# Patient Record
Sex: MALE | Race: WHITE | Employment: OTHER | URBAN - METROPOLITAN AREA
[De-identification: names, ages, dates, MRNs, and addresses within clinical notes are randomized per-mention and may not be internally consistent; named-entity substitution may affect disease eponyms.]

---

## 2021-08-24 ENCOUNTER — OFFICE VISIT (OUTPATIENT)
Dept: FAMILY MEDICINE CLINIC | Facility: CLINIC | Age: 61
End: 2021-08-24
Payer: COMMERCIAL

## 2021-08-24 VITALS
HEIGHT: 66.34 IN | WEIGHT: 218 LBS | BODY MASS INDEX: 34.62 KG/M2 | SYSTOLIC BLOOD PRESSURE: 136 MMHG | RESPIRATION RATE: 16 BRPM | HEART RATE: 93 BPM | TEMPERATURE: 98 F | DIASTOLIC BLOOD PRESSURE: 80 MMHG | OXYGEN SATURATION: 96 %

## 2021-08-24 DIAGNOSIS — Z12.11 SCREENING FOR COLON CANCER: ICD-10-CM

## 2021-08-24 DIAGNOSIS — Z00.00 ANNUAL PHYSICAL EXAM: Primary | ICD-10-CM

## 2021-08-24 DIAGNOSIS — Z13.228 SCREENING FOR ENDOCRINE, NUTRITIONAL, METABOLIC AND IMMUNITY DISORDER: ICD-10-CM

## 2021-08-24 DIAGNOSIS — Z13.21 SCREENING FOR ENDOCRINE, NUTRITIONAL, METABOLIC AND IMMUNITY DISORDER: ICD-10-CM

## 2021-08-24 DIAGNOSIS — Z13.29 SCREENING FOR ENDOCRINE, NUTRITIONAL, METABOLIC AND IMMUNITY DISORDER: ICD-10-CM

## 2021-08-24 DIAGNOSIS — Z13.0 SCREENING FOR ENDOCRINE, NUTRITIONAL, METABOLIC AND IMMUNITY DISORDER: ICD-10-CM

## 2021-08-24 DIAGNOSIS — Z13.6 SCREENING FOR ISCHEMIC HEART DISEASE: ICD-10-CM

## 2021-08-24 DIAGNOSIS — R20.0 NUMBNESS AND TINGLING OF BOTH LEGS BELOW KNEES: ICD-10-CM

## 2021-08-24 DIAGNOSIS — M79.2 NEURALGIA OF LOWER EXTREMITY, UNSPECIFIED LATERALITY: ICD-10-CM

## 2021-08-24 DIAGNOSIS — R20.2 NUMBNESS AND TINGLING OF BOTH LEGS BELOW KNEES: ICD-10-CM

## 2021-08-24 PROCEDURE — 3079F DIAST BP 80-89 MM HG: CPT | Performed by: FAMILY MEDICINE

## 2021-08-24 PROCEDURE — 3008F BODY MASS INDEX DOCD: CPT | Performed by: FAMILY MEDICINE

## 2021-08-24 PROCEDURE — 3075F SYST BP GE 130 - 139MM HG: CPT | Performed by: FAMILY MEDICINE

## 2021-08-24 PROCEDURE — 99386 PREV VISIT NEW AGE 40-64: CPT | Performed by: FAMILY MEDICINE

## 2021-08-24 NOTE — PATIENT INSTRUCTIONS
Perform labs fasting 8 hours with water or black coffee or or black tea diet  soda only prior to exam.    -Encourage healthy diet of whole food and avoid processed food and sugary drinks and sodas.   Diet should include lean meats and vegetables including 5 your blood pressure to help prevent a stroke or heart attack  · Control diabetes, or reduce your risk of getting this disease  · Improve your heart and lung function  · Reach and maintain a healthy weight  · Make your muscles stronger and more limber so yo healthier can improve several of your heart risks at once. For instance, it helps you manage weight, cholesterol, and blood pressure. Here are ideas to help you make heart-healthy changes without giving up all the foods and flavors you love.   Getting start than what is listed here:  · Fruits and vegetables provide plenty of nutrients without a lot of calories. At meals, fill half your plate with these foods. Split the other half of your plate between whole grains and lean protein.   · Whole grains are high in cilantro, cinnamon, pepper, and rosemary. Date Last Reviewed: 10/1/2017  © 5337-8529 [employer]. [facility]7 [facility] [place] [facility], [address] [place] [address]. All rights reserved. This information is not intended as a substitute for professional medical care. treatments do have risks. So talk with your healthcare provider if you have concerns. If you have osteoporosis, you can also learn ways to increase everyday safety. Date Last Reviewed: 5/1/2018  © 9201-5053 [employer].  Jonna Dickens. old, men: 1,000 mg  Pregnant or nursing: 15to 25years old: 1,300 mg, 23to 48years old: 1,000 mg  Older than 79 (women and men): 1,200 mg   Date Last Reviewed: 5/1/2018  © 2330-7264 [employer]. 1407 [facility], [facility].  A parathyroid gland  · Cancer  · Autoimmune diseases, such as multiple sclerosis and Crohn's disease  · Psoriasis  · Asthma  · Weakness or falls    What other tests might I have along with this test?  A healthcare provider may also want to check your parathy test?  Tell your healthcare provider if you take vitamin D supplements. Be sure your healthcare provider knows about all medicines, herbs, vitamins, and supplements you are taking.  This includes medicines that don't need a prescription and any illicit drug professional's instructions. Living with Osteoporosis: Regular Exercise  If you have osteoporosis, exercise is vital for your health. It can prevent bone fractures and spine changes. It will slow bone loss. Exercise will strengthen your body.  It c

## 2021-08-27 PROBLEM — E53.8 B12 DEFICIENCY: Status: ACTIVE | Noted: 2021-08-27

## 2021-08-27 LAB
ABSOLUTE BASOPHILS: 88 CELLS/UL (ref 0–200)
ABSOLUTE EOSINOPHILS: 440 CELLS/UL (ref 15–500)
ABSOLUTE LYMPHOCYTES: 3144 CELLS/UL (ref 850–3900)
ABSOLUTE MONOCYTES: 776 CELLS/UL (ref 200–950)
ABSOLUTE NEUTROPHILS: 3552 CELLS/UL (ref 1500–7800)
ALBUMIN/GLOBULIN RATIO: 1.8 (CALC) (ref 1–2.5)
ALBUMIN: 3.5 G/DL (ref 3.6–5.1)
ALKALINE PHOSPHATASE: 54 U/L (ref 35–144)
ALT: 27 U/L (ref 9–46)
AST: 24 U/L (ref 10–35)
BASOPHILS: 1.1 %
BILIRUBIN, TOTAL: 0.3 MG/DL (ref 0.2–1.2)
BUN: 18 MG/DL (ref 7–25)
CALCIUM: 8.8 MG/DL (ref 8.6–10.3)
CARBON DIOXIDE: 30 MMOL/L (ref 20–32)
CHLORIDE: 110 MMOL/L (ref 98–110)
CHOL/HDLC RATIO: 3.2 (CALC)
CHOLESTEROL, TOTAL: 167 MG/DL
CREATININE: 0.92 MG/DL (ref 0.7–1.25)
EGFR IF AFRICN AM: 103 ML/MIN/1.73M2
EGFR IF NONAFRICN AM: 89 ML/MIN/1.73M2
EOSINOPHILS: 5.5 %
GLOBULIN: 2 G/DL (CALC) (ref 1.9–3.7)
GLUCOSE: 93 MG/DL (ref 65–99)
HDL CHOLESTEROL: 53 MG/DL
HEMATOCRIT: 46.2 % (ref 38.5–50)
HEMOGLOBIN: 15.1 G/DL (ref 13.2–17.1)
LDL-CHOLESTEROL: 98 MG/DL (CALC)
LYMPHOCYTES: 39.3 %
MCH: 29.5 PG (ref 27–33)
MCHC: 32.7 G/DL (ref 32–36)
MCV: 90.2 FL (ref 80–100)
MONOCYTES: 9.7 %
MPV: 10.4 FL (ref 7.5–12.5)
NEUTROPHILS: 44.4 %
NON-HDL CHOLESTEROL: 114 MG/DL (CALC)
PLATELET COUNT: 354 THOUSAND/UL (ref 140–400)
POTASSIUM: 4.6 MMOL/L (ref 3.5–5.3)
PROTEIN, TOTAL: 5.5 G/DL (ref 6.1–8.1)
PSA, TOTAL: 2 NG/ML
RDW: 13 % (ref 11–15)
RED BLOOD CELL COUNT: 5.12 MILLION/UL (ref 4.2–5.8)
SODIUM: 144 MMOL/L (ref 135–146)
TRIGLYCERIDES: 75 MG/DL
TSH W/REFLEX TO FT4: 1.87 MIU/L (ref 0.4–4.5)
VITAMIN B12: 265 PG/ML (ref 200–1100)
WHITE BLOOD CELL COUNT: 8 THOUSAND/UL (ref 3.8–10.8)

## 2021-09-07 NOTE — PROGRESS NOTES
Fly Wong,    I have reviewed your test results. . Your stool screening test for colon cancer is negative. Recommend repeat in 1 year or undergo colonoscopy. Please call our office if you have any questions.     Sincerely,  Daxa Barbosa, DO

## 2021-09-29 ENCOUNTER — HOSPITAL ENCOUNTER (OUTPATIENT)
Dept: ULTRASOUND IMAGING | Age: 61
Discharge: HOME | End: 2021-09-29
Attending: FAMILY MEDICINE
Payer: COMMERCIAL

## 2021-09-29 DIAGNOSIS — R20.2 NUMBNESS AND TINGLING OF BOTH LEGS BELOW KNEES: ICD-10-CM

## 2021-09-29 DIAGNOSIS — M79.2 NEURALGIA OF LOWER EXTREMITY, UNSPECIFIED LATERALITY: ICD-10-CM

## 2021-09-29 DIAGNOSIS — R20.0 NUMBNESS AND TINGLING OF BOTH LEGS BELOW KNEES: ICD-10-CM

## 2021-09-29 PROCEDURE — 93922 UPR/L XTREMITY ART 2 LEVELS: CPT | Performed by: FAMILY MEDICINE

## 2021-09-30 ENCOUNTER — PATIENT MESSAGE (OUTPATIENT)
Dept: FAMILY MEDICINE CLINIC | Facility: CLINIC | Age: 61
End: 2021-09-30

## 2021-09-30 NOTE — TELEPHONE ENCOUNTER
From: Sergio Bundy  To: Elías Rachel DO  Sent: 9/30/2021 8:49 AM CDT  Subject: test results    Hi Dr. Naveen Marcos, I have done the ultrasound and it has come back negative.  Should I now schedule the Neuro visit with Dr. Miguelito Duarte as is listed on the

## 2021-10-23 PROBLEM — S16.1XXA CERVICAL STRAIN, ACUTE: Status: ACTIVE | Noted: 2018-07-10

## 2021-10-25 ENCOUNTER — OFFICE VISIT (OUTPATIENT)
Dept: FAMILY MEDICINE CLINIC | Facility: CLINIC | Age: 61
End: 2021-10-25
Payer: COMMERCIAL

## 2021-10-25 VITALS
RESPIRATION RATE: 18 BRPM | OXYGEN SATURATION: 96 % | SYSTOLIC BLOOD PRESSURE: 128 MMHG | TEMPERATURE: 98 F | DIASTOLIC BLOOD PRESSURE: 80 MMHG | WEIGHT: 226 LBS | HEART RATE: 112 BPM | BODY MASS INDEX: 35.89 KG/M2 | HEIGHT: 66.34 IN

## 2021-10-25 DIAGNOSIS — R06.00 DYSPNEA ON EXERTION: ICD-10-CM

## 2021-10-25 DIAGNOSIS — R94.31 ABNORMAL RESTING ECG FINDINGS: ICD-10-CM

## 2021-10-25 DIAGNOSIS — I25.2 OLD ANTEROSEPTAL MYOCARDIAL INFARCTION: ICD-10-CM

## 2021-10-25 DIAGNOSIS — R20.0 NUMBNESS AND TINGLING OF BOTH LEGS BELOW KNEES: Primary | ICD-10-CM

## 2021-10-25 DIAGNOSIS — R20.2 NUMBNESS AND TINGLING OF BOTH LEGS BELOW KNEES: Primary | ICD-10-CM

## 2021-10-25 DIAGNOSIS — E53.8 B12 DEFICIENCY: ICD-10-CM

## 2021-10-25 DIAGNOSIS — R00.0 SINUS TACHYCARDIA: ICD-10-CM

## 2021-10-25 PROBLEM — R06.09 DYSPNEA ON EXERTION: Status: ACTIVE | Noted: 2021-10-25

## 2021-10-25 PROCEDURE — 93000 ELECTROCARDIOGRAM COMPLETE: CPT | Performed by: FAMILY MEDICINE

## 2021-10-25 PROCEDURE — 3008F BODY MASS INDEX DOCD: CPT | Performed by: FAMILY MEDICINE

## 2021-10-25 PROCEDURE — 3074F SYST BP LT 130 MM HG: CPT | Performed by: FAMILY MEDICINE

## 2021-10-25 PROCEDURE — 99215 OFFICE O/P EST HI 40 MIN: CPT | Performed by: FAMILY MEDICINE

## 2021-10-25 PROCEDURE — 3079F DIAST BP 80-89 MM HG: CPT | Performed by: FAMILY MEDICINE

## 2021-10-25 NOTE — PROGRESS NOTES
Tamra COMPLAINT: Patient presents with:  Lab Results        HPI:     Price Taylor is a 57year old male presents for follow-up on neuropathy that occurs when standing or walking for 10 minutes. Balance will be thrown off.   Symptoms are relieved by (102.5 kg)  08/24/21 : 218 lb (98.9 kg)        HISTORY:  History reviewed. No pertinent past medical history. History reviewed. No pertinent surgical history.    Family History   Problem Relation Age of Onset   • Cancer Father 80        cancer blood   • C clubbing, or edema bilaterally. Skin: no acute rashes  Neuro: AOx3.   Normal gait    LABS     ECG: Sinus tachycardia with old anterior septal infarct  WU=747 bpm  FL interval,QT/Qtc,PRT Axes all reviewed and are normal  No Ectopy noted  No prior for ramy 54   AST      10 - 35 U/L 24   ALT (SGPT)      9 - 46 U/L 27   Cholesterol, Total      <200 mg/dL 167   HDL Cholesterol      > OR = 40 mg/dL 53   Triglycerides      <150 mg/dL 75   LDL Cholesterol Calc      mg/dL (calc) 98   CHOL/HDLC RATIO      <5.0 (calc dyspnea on exertion after 300 feet and feels exhausted  Feels heart rate is easily elevated-thyroid is normal.  Electrolytes are normal.  Start with nuclear stress test rule out CAD, may need cardiac echo to check ejection fracture and heart function-no or extremity     BMI 34.0-34.9,adult     B12 deficiency     Hemorrhage of rectum and anus     Cervical strain, acute      Imaging & Referrals:  None     10/25/2021  Kinjal Kennedy, DO      Patient understands plan and follow-up.   Return in about 1 month ([place]

## 2021-11-18 ENCOUNTER — PATIENT MESSAGE (OUTPATIENT)
Dept: FAMILY MEDICINE CLINIC | Facility: CLINIC | Age: 61
End: 2021-11-18

## 2021-11-19 NOTE — TELEPHONE ENCOUNTER
From: Margaret Armas  To: Dejuan Hong,   Sent: 11/18/2021 5:44 PM CST  Subject: B 12 results    Hi Dr. Alysha Andre,   We saw the results of [place]'s B12 test. I want to tell you why it might be so high.  Since you saw him last he has continued taking the roberth

## 2021-11-22 ENCOUNTER — TELEPHONE (OUTPATIENT)
Dept: FAMILY MEDICINE CLINIC | Facility: CLINIC | Age: 61
End: 2021-11-22

## 2021-11-22 NOTE — TELEPHONE ENCOUNTER
Rigo mcgowan     ARIELLE Clinical Guideline 024 for Myocardial Perfusion Imaging was used to make this decision. · This decision was based on the notes that were sent: shortness of breath with exertion, a heart tracing   (ECG (Electrocardiogram)).    · Bef

## 2021-11-24 ENCOUNTER — OFFICE VISIT (OUTPATIENT)
Dept: NEUROLOGY | Facility: CLINIC | Age: 61
End: 2021-11-24
Payer: COMMERCIAL

## 2021-11-24 VITALS
DIASTOLIC BLOOD PRESSURE: 95 MMHG | SYSTOLIC BLOOD PRESSURE: 145 MMHG | RESPIRATION RATE: 16 BRPM | HEART RATE: 79 BPM | HEIGHT: 66.34 IN | BODY MASS INDEX: 35.89 KG/M2 | WEIGHT: 226 LBS

## 2021-11-24 DIAGNOSIS — R20.0 NUMBNESS IN BOTH LEGS: ICD-10-CM

## 2021-11-24 DIAGNOSIS — G95.19 NEUROGENIC CLAUDICATION (HCC): Primary | ICD-10-CM

## 2021-11-24 PROCEDURE — 3080F DIAST BP >= 90 MM HG: CPT | Performed by: OTHER

## 2021-11-24 PROCEDURE — 3008F BODY MASS INDEX DOCD: CPT | Performed by: OTHER

## 2021-11-24 PROCEDURE — 99244 OFF/OP CNSLTJ NEW/EST MOD 40: CPT | Performed by: OTHER

## 2021-11-24 PROCEDURE — 3077F SYST BP >= 140 MM HG: CPT | Performed by: OTHER

## 2021-11-24 NOTE — TELEPHONE ENCOUNTER
Spoke to pt informed of authorization provided authorization number.  Provided central scheduling number [phone] to schedule test.

## 2021-11-24 NOTE — TELEPHONE ENCOUNTER
Peer to peer completed  Authorization #[id number] valid until 2/3/2022-middle digit-is I as in [place] and not a 1. Valid until 2/3/2022    Please tell patient to schedule his myocardial perfusion imaging. Spoke with insurance and received authorization.

## 2021-11-24 NOTE — PATIENT INSTRUCTIONS
Please have labs done and imaging done and schedule EMG next available time  Follow up ~ 2 months     Refill policies:    • Allow 2-3 business days for refills; controlled substances may take longer.   • Contact your pharmacy at least 5 days prior to zaida schedule the test until this office has notified you that the test has been approved by your insurer. Depending on your insurance carrier, approval may take 3-10 days.  It is highly recommended patients contact their insurance carrier directly to determine

## 2021-11-24 NOTE — H&P
[facility] New Patient / Consult Visit    Broderick Viveros is a 57year old male.                          Referring MD: Christina Cheek    Patient presents with:  Numbness: Referred by PCP, Numbness/tingling bilat LE radiating up to buttoc mentions a long time ago, he started to have \"stiff neck\" and was told he had \"slipped disc\" and told he could pursue surgery or therapy and never had surgery.             Otherwise, patient denies any recent weight change, fevers, chills, nausea, doubl motion; no carotid bruits    Mental status:  Alert and oriented to time, place, person, and situation  Speech: fluent  Language: normal naming, repetition, and comprehension  Memory: normal  Attention/concentration: normal    Fundoscopic Exam: optic discs RDW      11.0 - 15.0 %  13.0   Platelet Count      479 - 400 Thousand/uL  354   MPV      7.5 - 12.5 fL  10.4   Neutrophils Absolute      1,500 - 7,800 cells/uL  3,552   Lymphocytes Absolute      850 - 3,900 cells/uL  3,144   Monocytes Absolute      200 - LE in ankle jerks. In addition, straight leg raise is negative and gait is normal and no weakness is noted.       Patient was previously noted to have low B12 but symptoms with numbness in leg up to buttocks worse with exertion are suggestive of neurogenic

## 2021-11-29 ENCOUNTER — TELEPHONE (OUTPATIENT)
Dept: NEUROLOGY | Facility: CLINIC | Age: 61
End: 2021-11-29

## 2021-11-29 NOTE — TELEPHONE ENCOUNTER
Vitaly Larkin from [facility] states monoclonal protein study did not have test code attached to order. Nothing translated per her research. Provided Vitaly Larkin with associated [facility] code # 74843. KEVIN and Vitaly Larkin will now process sample.

## 2021-12-02 ENCOUNTER — PROCEDURE VISIT (OUTPATIENT)
Dept: NEUROLOGY | Facility: CLINIC | Age: 61
End: 2021-12-02
Payer: COMMERCIAL

## 2021-12-02 DIAGNOSIS — R20.0 NUMBNESS IN BOTH LEGS: Primary | ICD-10-CM

## 2021-12-02 PROCEDURE — 95909 NRV CNDJ TST 5-6 STUDIES: CPT | Performed by: OTHER

## 2021-12-02 PROCEDURE — 95886 MUSC TEST DONE W/N TEST COMP: CPT | Performed by: OTHER

## 2021-12-02 NOTE — PROCEDURES
[facility]  [address] [facility] [place], [address] [facility]  Ph: [phone]  FAX: [phone]        Full Name: Jesús Shi Gender: Male  Patient ID: [id number] YOB: 1960      Visit Date: 12/2/2021 11:12  Ag - Ankle 37 9.48 39       F  Wave      Nerve F Lat M Lat F-M Lat    ms ms ms   R Peroneal - EDB NR NR NR   R Tibial - AH 41.8 5.8 35.9   L Tibial - AH 47.4 5.5 41.9   L Peroneal - EDB 63.3 6.2 57.1       EMG         EMG Summary Table     Spontaneous MUAP Re fasciculations was noted in the bilateral medial gastrocnemius muscle. Motor unit potentials demonstrated increased amplitude and duration and mild polyphasia with reduced recruitment in the bilateral medial gastrocnemius muscle      Impression:    This is

## 2021-12-23 ENCOUNTER — HOSPITAL ENCOUNTER (OUTPATIENT)
Dept: MRI IMAGING | Age: 61
Discharge: HOME | End: 2021-12-23
Attending: Other
Payer: COMMERCIAL

## 2021-12-23 DIAGNOSIS — G95.19 NEUROGENIC CLAUDICATION (HCC): ICD-10-CM

## 2021-12-23 PROCEDURE — 72148 MRI LUMBAR SPINE W/O DYE: CPT | Performed by: OTHER

## 2021-12-27 ENCOUNTER — HOSPITAL ENCOUNTER (OUTPATIENT)
Dept: CV DIAGNOSTICS | Facility: HOSPITAL | Age: 61
Discharge: HOME | End: 2021-12-27
Attending: FAMILY MEDICINE
Payer: COMMERCIAL

## 2021-12-27 DIAGNOSIS — R94.31 ABNORMAL RESTING ECG FINDINGS: ICD-10-CM

## 2021-12-27 DIAGNOSIS — I25.2 OLD ANTEROSEPTAL MYOCARDIAL INFARCTION: ICD-10-CM

## 2021-12-27 DIAGNOSIS — R06.00 DYSPNEA ON EXERTION: ICD-10-CM

## 2021-12-27 PROCEDURE — 78452 HT MUSCLE IMAGE SPECT MULT: CPT | Performed by: FAMILY MEDICINE

## 2021-12-27 PROCEDURE — 93017 CV STRESS TEST TRACING ONLY: CPT | Performed by: FAMILY MEDICINE

## 2021-12-27 PROCEDURE — 93018 CV STRESS TEST I&R ONLY: CPT | Performed by: FAMILY MEDICINE

## 2021-12-29 ENCOUNTER — TELEPHONE (OUTPATIENT)
Dept: FAMILY MEDICINE CLINIC | Facility: CLINIC | Age: 61
End: 2021-12-29

## 2021-12-29 NOTE — TELEPHONE ENCOUNTER
Please see prior message-at 1150 response was given. Signed              Patient should schedule an office visit to recheck and discuss blood pressure medications and best treatment options.   We typically do not do this over phone encounter--start new

## 2021-12-29 NOTE — TELEPHONE ENCOUNTER
This was a My chart message received from patient. Taylor Stone To   Holgeraby Siegel, DO Sent   12/29/2021  9:01 AM   Hello,  Have you received any results of my MRI 12/23/21  or my stress test 12/27/21  ? I am wondering what to do from here.   My b

## 2021-12-29 NOTE — TELEPHONE ENCOUNTER
Patient should schedule an office visit to recheck and discuss blood pressure medications and best treatment options.   We typically do not do this over phone encounter--start new medication etc.  Need current vitals in office and physical exam blood pressu

## 2022-01-11 ENCOUNTER — OFFICE VISIT (OUTPATIENT)
Dept: FAMILY MEDICINE CLINIC | Facility: CLINIC | Age: 62
End: 2022-01-11
Payer: COMMERCIAL

## 2022-01-11 VITALS
SYSTOLIC BLOOD PRESSURE: 156 MMHG | OXYGEN SATURATION: 98 % | BODY MASS INDEX: 36.29 KG/M2 | DIASTOLIC BLOOD PRESSURE: 89 MMHG | WEIGHT: 225.81 LBS | RESPIRATION RATE: 16 BRPM | TEMPERATURE: 98 F | HEART RATE: 97 BPM | HEIGHT: 66 IN

## 2022-01-11 DIAGNOSIS — I10 ESSENTIAL HYPERTENSION: Primary | ICD-10-CM

## 2022-01-11 PROCEDURE — 3077F SYST BP >= 140 MM HG: CPT | Performed by: FAMILY MEDICINE

## 2022-01-11 PROCEDURE — 3079F DIAST BP 80-89 MM HG: CPT | Performed by: FAMILY MEDICINE

## 2022-01-11 PROCEDURE — 3008F BODY MASS INDEX DOCD: CPT | Performed by: FAMILY MEDICINE

## 2022-01-11 PROCEDURE — 99214 OFFICE O/P EST MOD 30 MIN: CPT | Performed by: FAMILY MEDICINE

## 2022-01-11 RX ORDER — ATENOLOL 25 MG/1
25 TABLET ORAL DAILY
Qty: 90 TABLET | Refills: 0 | Status: SHIPPED | OUTPATIENT
Start: 2022-01-11

## 2022-01-11 NOTE — PROGRESS NOTES
CHIEF COMPLAINT: Patient presents with:  Hypertension: high blood pressure reading       HPI:     Ignacio Curtis is a 57year old male presents for discuss hypertension.   Monitoring Home blood pressures 150-160/  States nevertheless than 150 systoli Vaping Use: Never used    Alcohol use:  Yes      Alcohol/week: 4.0 - 6.0 standard drinks      Types: 4 - 6 Cans of beer per week      Comment: 2 drinks daily    Drug use: Never       Medications (Active prior to today's visit):  No current outpatient medi pressures less than 140/90-optimal is 110-120/70-80.   Reviewed symptoms of hypotension i.e. fatigue, lightheadedness, dizziness, weakness etc.-if occurs check blood pressure and call office  Encouraged low-sodium diet less than 2 g daily  Recheck blood pre pressure.

## 2022-01-24 ENCOUNTER — OFFICE VISIT (OUTPATIENT)
Dept: NEUROLOGY | Facility: CLINIC | Age: 62
End: 2022-01-24
Payer: COMMERCIAL

## 2022-01-24 VITALS
RESPIRATION RATE: 16 BRPM | HEIGHT: 66 IN | HEART RATE: 70 BPM | BODY MASS INDEX: 36.16 KG/M2 | DIASTOLIC BLOOD PRESSURE: 76 MMHG | SYSTOLIC BLOOD PRESSURE: 110 MMHG | WEIGHT: 225 LBS

## 2022-01-24 DIAGNOSIS — M48.062 SPINAL STENOSIS OF LUMBAR REGION WITH NEUROGENIC CLAUDICATION: Primary | ICD-10-CM

## 2022-01-24 PROCEDURE — 3008F BODY MASS INDEX DOCD: CPT | Performed by: OTHER

## 2022-01-24 PROCEDURE — 3074F SYST BP LT 130 MM HG: CPT | Performed by: OTHER

## 2022-01-24 PROCEDURE — 3078F DIAST BP <80 MM HG: CPT | Performed by: OTHER

## 2022-01-24 PROCEDURE — 99214 OFFICE O/P EST MOD 30 MIN: CPT | Performed by: OTHER

## 2022-01-24 NOTE — PROGRESS NOTES
[facility] Progress Note    HPI  Patient presents with:  Numbness: F/U BLE, notes no improvement       As per my initial H&P from 11/24/2021,   \" Lynne Rene is a 57year old, who presents for evaluation of numbness/tingling in bot never had surgery.             Otherwise, patient denies any recent weight change, fevers, chills, nausea, double vision/ blurry vision / loss of vision, chest pain, palpitations, shortness of breath, rashes, joint pains, bowel / bladder incontinence or moo Concerns:        Caffeine Concern: Yes          1 cup coffee daily        Exercise: No        Seat Belt: No        Special Diet: No        Stress Concern: No        Weight Concern: No      No Known Allergies      Current Outpatient Medications:   •  atenol ALPHA-2-GLOBULINS      0.5 - 0.9 g/dL  0.7    BETA 1 GLOBULINL ELPH-MCNC      0.4 - 0.6 g/dL  0.4    BETA 2 GLOBULIN      0.2 - 0.5 g/dL  0.4    GAMMA GLOBULINS      0.8 - 1.7 g/dL  0.8    HUE SCREEN, IFA      NEGATIVE   NEGATIVE   RHEUMATOID FACTOR 144 U/L  54   AST (SGOT)      10 - 35 U/L  24   ALT (SGPT)      9 - 46 U/L  27   TSH      0.40 - 4.50 mIU/L  1.87   Vitamin B12      200 - 1,100 pg/mL 1,589 (H)        Imaging:  New since last visit    MRI lumbar spine (12/23/2021):      FINDINGS:     LUMBA congenitally slender canal.        2. L3-4 moderate disc bulge with severe central canal stenosis and mild bilateral neural foraminal narrowing.       3.  L4-5 with moderate broad-based disc bulge, ligamentum flavum hypertrophy, facet joint degenerative corrie Vastus medialis Femoral L2-L4 N None None None None N N N N   R. Tibialis anterior Deep peroneal (Fibular) L4-L5 N None None None None N N N N   L. Tibialis anterior Deep peroneal (Fibular) L4-L5 N None None None None N N N N   R.  Peroneus longus Perineal gastrocnemius muscles. This may suggest a chronic lumbosacral radiculopathy, although no denervation changes were appreciated in the lumbar paraspinal muscles.   However, there was no evidence for an underlying large fiber polyneuropathy, primary demyelina questions and all questions answered to the best of my ability       No follow-ups on file.     Sushma Shelley MD, Neurology  [facility]  Pager [phone]  1/24/2022

## 2022-02-01 ENCOUNTER — OFFICE VISIT (OUTPATIENT)
Dept: SURGERY | Facility: CLINIC | Age: 62
End: 2022-02-01
Payer: COMMERCIAL

## 2022-02-01 VITALS
BODY MASS INDEX: 36.16 KG/M2 | SYSTOLIC BLOOD PRESSURE: 118 MMHG | WEIGHT: 225 LBS | DIASTOLIC BLOOD PRESSURE: 74 MMHG | HEIGHT: 66 IN

## 2022-02-01 DIAGNOSIS — M48.062 LUMBAR STENOSIS WITH NEUROGENIC CLAUDICATION: Primary | ICD-10-CM

## 2022-02-01 PROCEDURE — 99203 OFFICE O/P NEW LOW 30 MIN: CPT | Performed by: NEUROLOGICAL SURGERY

## 2022-02-01 PROCEDURE — 3078F DIAST BP <80 MM HG: CPT | Performed by: NEUROLOGICAL SURGERY

## 2022-02-01 PROCEDURE — 3074F SYST BP LT 130 MM HG: CPT | Performed by: NEUROLOGICAL SURGERY

## 2022-02-01 PROCEDURE — 3008F BODY MASS INDEX DOCD: CPT | Performed by: NEUROLOGICAL SURGERY

## 2022-02-01 RX ORDER — GABAPENTIN 100 MG/1
100 CAPSULE ORAL 3 TIMES DAILY
Qty: 180 CAPSULE | Refills: 2 | Status: SHIPPED | OUTPATIENT
Start: 2022-02-01 | End: 2022-03-17

## 2022-02-01 NOTE — PROGRESS NOTES
MRI done in December  When walking and after walking long distances will have pain in both legs, pretty equal    No falls    No back sx  No injections  No PT

## 2022-02-11 ENCOUNTER — OFFICE VISIT (OUTPATIENT)
Dept: FAMILY MEDICINE CLINIC | Facility: CLINIC | Age: 62
End: 2022-02-11
Payer: COMMERCIAL

## 2022-02-11 VITALS
TEMPERATURE: 98 F | DIASTOLIC BLOOD PRESSURE: 70 MMHG | WEIGHT: 226.19 LBS | HEIGHT: 66 IN | OXYGEN SATURATION: 97 % | BODY MASS INDEX: 36.35 KG/M2 | SYSTOLIC BLOOD PRESSURE: 122 MMHG | HEART RATE: 67 BPM | RESPIRATION RATE: 18 BRPM

## 2022-02-11 DIAGNOSIS — I10 ESSENTIAL HYPERTENSION: Primary | ICD-10-CM

## 2022-02-11 PROCEDURE — 99213 OFFICE O/P EST LOW 20 MIN: CPT | Performed by: FAMILY MEDICINE

## 2022-02-11 PROCEDURE — 3008F BODY MASS INDEX DOCD: CPT | Performed by: FAMILY MEDICINE

## 2022-02-11 PROCEDURE — 3078F DIAST BP <80 MM HG: CPT | Performed by: FAMILY MEDICINE

## 2022-02-11 PROCEDURE — 3074F SYST BP LT 130 MM HG: CPT | Performed by: FAMILY MEDICINE

## 2022-02-11 RX ORDER — ATENOLOL 25 MG/1
25 TABLET ORAL DAILY
Qty: 90 TABLET | Refills: 1 | Status: SHIPPED | OUTPATIENT
Start: 2022-02-11

## 2022-02-21 ENCOUNTER — HOSPITAL ENCOUNTER (OUTPATIENT)
Dept: GENERAL RADIOLOGY | Age: 62
Discharge: HOME | End: 2022-02-21
Attending: NEUROLOGICAL SURGERY
Payer: COMMERCIAL

## 2022-02-21 DIAGNOSIS — M48.062 LUMBAR STENOSIS WITH NEUROGENIC CLAUDICATION: ICD-10-CM

## 2022-02-21 PROCEDURE — 72114 X-RAY EXAM L-S SPINE BENDING: CPT | Performed by: NEUROLOGICAL SURGERY

## 2022-02-22 LAB
BUN: 20 MG/DL (ref 7–25)
CALCIUM: 9.1 MG/DL (ref 8.6–10.3)
CARBON DIOXIDE: 30 MMOL/L (ref 20–32)
CHLORIDE: 105 MMOL/L (ref 98–110)
CREATININE: 0.94 MG/DL (ref 0.7–1.25)
EGFR IF AFRICN AM: 100 ML/MIN/1.73M2
EGFR IF NONAFRICN AM: 87 ML/MIN/1.73M2
GLUCOSE: 111 MG/DL (ref 65–139)
POTASSIUM: 4.8 MMOL/L (ref 3.5–5.3)
SODIUM: 141 MMOL/L (ref 135–146)

## 2022-03-17 ENCOUNTER — OFFICE VISIT (OUTPATIENT)
Dept: SURGERY | Facility: CLINIC | Age: 62
End: 2022-03-17
Payer: COMMERCIAL

## 2022-03-17 VITALS
BODY MASS INDEX: 36 KG/M2 | SYSTOLIC BLOOD PRESSURE: 100 MMHG | HEART RATE: 69 BPM | DIASTOLIC BLOOD PRESSURE: 80 MMHG | OXYGEN SATURATION: 96 % | WEIGHT: 226 LBS

## 2022-03-17 DIAGNOSIS — M48.062 LUMBAR STENOSIS WITH NEUROGENIC CLAUDICATION: Primary | ICD-10-CM

## 2022-03-17 PROCEDURE — 3074F SYST BP LT 130 MM HG: CPT | Performed by: NEUROLOGICAL SURGERY

## 2022-03-17 PROCEDURE — 99213 OFFICE O/P EST LOW 20 MIN: CPT | Performed by: NEUROLOGICAL SURGERY

## 2022-03-17 PROCEDURE — 3079F DIAST BP 80-89 MM HG: CPT | Performed by: NEUROLOGICAL SURGERY

## 2022-03-17 RX ORDER — PREGABALIN 75 MG/1
75 CAPSULE ORAL 2 TIMES DAILY
Qty: 120 CAPSULE | Refills: 1 | Status: SHIPPED | OUTPATIENT
Start: 2022-03-17

## 2022-03-17 NOTE — PROGRESS NOTES
Patient presents in office today with reported pain in low back radiating into bilateral legs. He is having bilateral numbness and tingling down bilateral legs. He has no pain but a lot of discomfort.

## 2022-03-18 ENCOUNTER — TELEPHONE (OUTPATIENT)
Dept: SURGERY | Facility: CLINIC | Age: 62
End: 2022-03-18

## 2022-03-18 NOTE — TELEPHONE ENCOUNTER
Received fax from pharmacy for PA for Lyrica  This was started online    DX: Lumbar stenosis with neurogenic claudication  M48.062    LOV: 3/17/22    Tried gabapentin    He like to try different medication  We will start him on Lyrica  We will increase that up to 150 twice a day  He will call me in 6 weeks with an updaten but did not help

## 2022-03-20 ENCOUNTER — PATIENT MESSAGE (OUTPATIENT)
Dept: FAMILY MEDICINE CLINIC | Facility: CLINIC | Age: 62
End: 2022-03-20

## 2022-03-21 NOTE — TELEPHONE ENCOUNTER
From: Lenin Gan  To: Charles Rocha DO  Sent: 3/20/2022 4:47 AM CDT  Subject: handicap placard    Hello, Due to my diagnosis of lumbar spinal stenosis, I have explained to Dr. Martha Danielle and Dr. Jacey Ray my walking issues. I have problems walking very far without tingling, numbness in legs. Was taking Gabapentin but no relief. I saw Dr. Jacey Ray a few days ago. He is starting me on Lyrica. I would like to apply for a handicap placard. Who would sign off on the form? Dr. Martha Danielle my PCP or Dr. Jacey Ray? Please let me know. No rush of course.  Thank you, Alexis Perdomo

## 2022-03-21 NOTE — TELEPHONE ENCOUNTER
Received fax from insurance to fill out for PA. Form filled out and need providers signature, then will fax.

## 2022-03-22 NOTE — TELEPHONE ENCOUNTER
Received PA form signed by Dr Nate Santa. Faxed to Mikey along with OV note from 3-17-22. Confirmation received. Awaiting response.

## 2022-03-28 ENCOUNTER — TELEPHONE (OUTPATIENT)
Dept: SURGERY | Facility: CLINIC | Age: 62
End: 2022-03-28

## 2022-03-28 NOTE — TELEPHONE ENCOUNTER
Patient sent a letter 03/23/2022 requesting a parking placard. Letter placed in Neurosurgery folder.

## 2022-03-30 ENCOUNTER — PATIENT MESSAGE (OUTPATIENT)
Dept: SURGERY | Facility: CLINIC | Age: 62
End: 2022-03-30

## 2022-03-30 NOTE — TELEPHONE ENCOUNTER
Called  #([phone]) on back of card to initiate prior authorization for medication.        Spoke to [place] and initiated prior-auth for medication  Call ref [id number]    They will fax over form

## 2022-03-30 NOTE — TELEPHONE ENCOUNTER
Pt informed placard form complete, mailed to address listed on form    Persons with Disabilities Placard Unit    [address] [facility], [address] [place] [address]    Copy of forms mailed to pt home address per pt request,  Copy also sent to scan

## 2022-03-30 NOTE — TELEPHONE ENCOUNTER
From: Genevieve White  To: Jesús Gautam MD  Sent: 3/30/2022 8:38 AM CDT  Subject: insurance denial of Lyrica, want further info    Hello, I received notice from my insurance that they have denied my RX for Lyrica. They are requesting further info from Dr. Miriam Cruz. I think they may have even sent a copy of this to your office. Please let me know how to proceed or if you wish to change to a different medication.  Thank you, Nakia Ortega

## 2022-04-01 NOTE — TELEPHONE ENCOUNTER
Received Denial from [facility] for Radha Webber. States that pt needs to trial Gabapentin, Amitriptyline, nortriptyline, imipramine(TCA)  Duloxetine, venlafaxine (SNRI)    Would pt benefit from any of these?  Or should we try an appeal?    Message sent to provider with questions of next steps

## 2022-04-01 NOTE — TELEPHONE ENCOUNTER
Received Denial from [facility] for Radha Webber. States that pt needs to trial Gabapentin, Amitriptyline, nortriptyline, imipramine(TCA)  Duloxetine, venlafaxine (SNRI)    Would pt benefit from any of these?  Or should we try an appeal?

## 2022-04-01 NOTE — TELEPHONE ENCOUNTER
Patient had a trial of gabapentin in February he was given 100 mg 3 times daily.     See Dr. Rolly Casiano note    Can we appeal it and send in the notes

## 2022-04-04 NOTE — TELEPHONE ENCOUNTER
Noted provider's reply regarding denial for Lyrica. Last office visit notes printed and faxed with [employer] authorization denial letter to fax number:    [phone]    Original fax remains at Nursing desk in folder for appeals.

## 2022-04-05 ENCOUNTER — PATIENT MESSAGE (OUTPATIENT)
Dept: FAMILY MEDICINE CLINIC | Facility: CLINIC | Age: 62
End: 2022-04-05

## 2022-04-06 NOTE — TELEPHONE ENCOUNTER
From: Arvil Kayser  To: Gabriel Slade DO  Sent: 4/5/2022 7:21 PM CDT  Subject: handicap placard form    I received a copy of the handicap placard form that your office has completed for me. Did you already submit a copy to the  or do I need to submit it myself?  Thank you, Jaci Luna

## 2022-04-11 RX ORDER — ATENOLOL 25 MG/1
TABLET ORAL
Qty: 90 TABLET | Refills: 1 | OUTPATIENT
Start: 2022-04-11

## 2022-04-13 NOTE — TELEPHONE ENCOUNTER
Received fax from [facility] that Radha Akbar was approved valid until 4-7-2023. [employer] message sent to patient. Copy sent to scan.

## 2022-05-18 ENCOUNTER — APPOINTMENT (OUTPATIENT)
Dept: GENERAL RADIOLOGY | Facility: HOSPITAL | Age: 62
End: 2022-05-18
Attending: EMERGENCY MEDICINE
Payer: COMMERCIAL

## 2022-05-18 ENCOUNTER — APPOINTMENT (OUTPATIENT)
Dept: CT IMAGING | Facility: HOSPITAL | Age: 62
End: 2022-05-18
Attending: EMERGENCY MEDICINE
Payer: COMMERCIAL

## 2022-05-18 ENCOUNTER — HOSPITAL ENCOUNTER (INPATIENT)
Facility: HOSPITAL | Age: 62
LOS: 6 days | Discharge: HOME | End: 2022-05-24
Attending: EMERGENCY MEDICINE | Admitting: HOSPITALIST
Payer: COMMERCIAL

## 2022-05-18 DIAGNOSIS — R50.9 FEVER, UNSPECIFIED FEVER CAUSE: ICD-10-CM

## 2022-05-18 DIAGNOSIS — R79.89 ELEVATED D-DIMER: ICD-10-CM

## 2022-05-18 DIAGNOSIS — J96.01 SEPSIS WITH ACUTE HYPOXIC RESPIRATORY FAILURE WITHOUT SEPTIC SHOCK, DUE TO UNSPECIFIED ORGANISM (HCC): ICD-10-CM

## 2022-05-18 DIAGNOSIS — M48.062 LUMBAR STENOSIS WITH NEUROGENIC CLAUDICATION: ICD-10-CM

## 2022-05-18 DIAGNOSIS — A41.9 SEPSIS WITH ACUTE HYPOXIC RESPIRATORY FAILURE WITHOUT SEPTIC SHOCK, DUE TO UNSPECIFIED ORGANISM (HCC): ICD-10-CM

## 2022-05-18 DIAGNOSIS — R65.20 SEPSIS WITH ACUTE HYPOXIC RESPIRATORY FAILURE WITHOUT SEPTIC SHOCK, DUE TO UNSPECIFIED ORGANISM (HCC): ICD-10-CM

## 2022-05-18 DIAGNOSIS — J96.01 ACUTE HYPOXEMIC RESPIRATORY FAILURE (HCC): Primary | ICD-10-CM

## 2022-05-18 DIAGNOSIS — E87.2 LACTIC ACIDOSIS: ICD-10-CM

## 2022-05-18 DIAGNOSIS — J10.1 INFLUENZA A: ICD-10-CM

## 2022-05-18 DIAGNOSIS — I10 HYPERTENSION, ESSENTIAL: ICD-10-CM

## 2022-05-18 DIAGNOSIS — N17.9 AKI (ACUTE KIDNEY INJURY) (HCC): ICD-10-CM

## 2022-05-18 PROBLEM — E87.20 LACTIC ACIDOSIS: Status: ACTIVE | Noted: 2022-05-18

## 2022-05-18 LAB
ALBUMIN SERPL-MCNC: 3.4 G/DL (ref 3.4–5)
ALBUMIN/GLOB SERPL: 0.8 {RATIO} (ref 1–2)
ALP LIVER SERPL-CCNC: 70 U/L
ALT SERPL-CCNC: 27 U/L
ANION GAP SERPL CALC-SCNC: 14 MMOL/L (ref 0–18)
ANION GAP SERPL CALC-SCNC: 9 MMOL/L (ref 0–18)
APTT PPP: 31.4 SECONDS (ref 23.3–35.6)
ARTERIAL PATENCY WRIST A: POSITIVE
AST SERPL-CCNC: 27 U/L (ref 15–37)
ATRIAL RATE: 132 BPM
BASE EXCESS BLDA CALC-SCNC: -5.6 MMOL/L (ref ?–2)
BASE EXCESS BLDA CALC-SCNC: -6.4 MMOL/L (ref ?–2)
BASE EXCESS BLDA CALC-SCNC: -6.6 MMOL/L (ref ?–2)
BASE EXCESS BLDA CALC-SCNC: -7.8 MMOL/L (ref ?–2)
BASOPHILS # BLD AUTO: 0.09 X10(3) UL (ref 0–0.2)
BASOPHILS NFR BLD AUTO: 0.6 %
BILIRUB SERPL-MCNC: 0.5 MG/DL (ref 0.1–2)
BILIRUB UR QL STRIP.AUTO: NEGATIVE
BODY TEMPERATURE: 97.2 F
BODY TEMPERATURE: 98.6 F
BUN BLD-MCNC: 22 MG/DL (ref 7–18)
BUN BLD-MCNC: 29 MG/DL (ref 7–18)
CA-I BLD-SCNC: 1.02 MMOL/L (ref 0.95–1.32)
CA-I BLD-SCNC: 1.04 MMOL/L (ref 0.95–1.32)
CA-I BLD-SCNC: 1.06 MMOL/L (ref 0.95–1.32)
CA-I BLD-SCNC: 1.08 MMOL/L (ref 0.95–1.32)
CALCIUM BLD-MCNC: 7.9 MG/DL (ref 8.5–10.1)
CALCIUM BLD-MCNC: 8.9 MG/DL (ref 8.5–10.1)
CHLORIDE SERPL-SCNC: 106 MMOL/L (ref 98–112)
CHLORIDE SERPL-SCNC: 109 MMOL/L (ref 98–112)
CLARITY UR REFRACT.AUTO: CLEAR
CO2 SERPL-SCNC: 20 MMOL/L (ref 21–32)
CO2 SERPL-SCNC: 23 MMOL/L (ref 21–32)
COHGB MFR BLD: 0.5 % SAT (ref 0–3)
COHGB MFR BLD: 0.8 % SAT (ref 0–3)
COHGB MFR BLD: 1.1 % SAT (ref 0–3)
COHGB MFR BLD: 1.3 % SAT (ref 0–3)
CREAT BLD-MCNC: 1.35 MG/DL
CREAT BLD-MCNC: 1.44 MG/DL
D DIMER PPP FEU-MCNC: 1.48 UG/ML FEU (ref ?–0.63)
EOSINOPHIL # BLD AUTO: 0.02 X10(3) UL (ref 0–0.7)
EOSINOPHIL NFR BLD AUTO: 0.1 %
ERYTHROCYTE [DISTWIDTH] IN BLOOD BY AUTOMATED COUNT: 13.4 %
ERYTHROCYTE [DISTWIDTH] IN BLOOD BY AUTOMATED COUNT: 13.8 %
EXPIRATORY PRESSURE: 5 CM H2O
EXPIRATORY PRESSURE: 5 CM H2O
FIO2: 40 %
FIO2: 40 %
FIO2: 70 %
FIO2: 70 %
FLUAV + FLUBV RNA SPEC NAA+PROBE: NEGATIVE
FLUAV + FLUBV RNA SPEC NAA+PROBE: POSITIVE
GLOBULIN PLAS-MCNC: 4.5 G/DL (ref 2.8–4.4)
GLUCOSE BLD-MCNC: 152 MG/DL (ref 70–99)
GLUCOSE BLD-MCNC: 215 MG/DL (ref 70–99)
GLUCOSE UR STRIP.AUTO-MCNC: NEGATIVE MG/DL
HCO3 BLDA-SCNC: 18.8 MEQ/L (ref 21–27)
HCO3 BLDA-SCNC: 19.8 MEQ/L (ref 21–27)
HCO3 BLDA-SCNC: 19.9 MEQ/L (ref 21–27)
HCO3 BLDA-SCNC: 20.6 MEQ/L (ref 21–27)
HCT VFR BLD AUTO: 42.7 %
HCT VFR BLD AUTO: 49.5 %
HGB BLD-MCNC: 13.3 G/DL
HGB BLD-MCNC: 13.5 G/DL
HGB BLD-MCNC: 13.8 G/DL
HGB BLD-MCNC: 14 G/DL
HGB BLD-MCNC: 14.3 G/DL
HGB BLD-MCNC: 15.2 G/DL
IMM GRANULOCYTES # BLD AUTO: 0.15 X10(3) UL (ref 0–1)
IMM GRANULOCYTES NFR BLD: 1.1 %
INR BLD: 1.09 (ref 0.8–1.2)
INSP PRESSURE: 14 CM H2O
INSP PRESSURE: 14 CM H2O
IPAP MAX: 35 CM H2O
IPAP MAX: 7 CM H2O
IPAP MAX: 7 CM H2O
IPAP MIN: 15 CM H2O
KETONES UR STRIP.AUTO-MCNC: NEGATIVE MG/DL
L PNEUMO AG UR QL: NEGATIVE
LACTATE BLD-SCNC: 1.3 MMOL/L (ref 0.5–2)
LACTATE BLD-SCNC: 1.8 MMOL/L (ref 0.5–2)
LACTATE BLD-SCNC: 3.9 MMOL/L (ref 0.5–2)
LACTATE BLD-SCNC: 6.4 MMOL/L (ref 0.5–2)
LACTATE SERPL-SCNC: 2.3 MMOL/L (ref 0.4–2)
LACTATE SERPL-SCNC: 3.9 MMOL/L (ref 0.4–2)
LACTATE SERPL-SCNC: 7 MMOL/L (ref 0.4–2)
LEUKOCYTE ESTERASE UR QL STRIP.AUTO: NEGATIVE
LYMPHOCYTES # BLD AUTO: 0.42 X10(3) UL (ref 1–4)
LYMPHOCYTES NFR BLD AUTO: 3 %
MCH RBC QN AUTO: 29 PG (ref 26–34)
MCH RBC QN AUTO: 29.6 PG (ref 26–34)
MCHC RBC AUTO-ENTMCNC: 30.7 G/DL (ref 31–37)
MCHC RBC AUTO-ENTMCNC: 31.1 G/DL (ref 31–37)
MCV RBC AUTO: 94.5 FL
MCV RBC AUTO: 95.1 FL
METHGB MFR BLD: 0.6 % SAT (ref 0.4–1.5)
METHGB MFR BLD: 1.1 % SAT (ref 0.4–1.5)
METHGB MFR BLD: 1.3 % SAT (ref 0.4–1.5)
METHGB MFR BLD: 1.4 % SAT (ref 0.4–1.5)
MONOCYTES # BLD AUTO: 0.66 X10(3) UL (ref 0.1–1)
MONOCYTES NFR BLD AUTO: 4.7 %
MRSA DNA SPEC QL NAA+PROBE: NEGATIVE
NEUTROPHILS # BLD AUTO: 12.76 X10 (3) UL (ref 1.5–7.7)
NEUTROPHILS # BLD AUTO: 12.76 X10(3) UL (ref 1.5–7.7)
NEUTROPHILS NFR BLD AUTO: 90.5 %
NITRITE UR QL STRIP.AUTO: NEGATIVE
NT-PROBNP SERPL-MCNC: 646 PG/ML (ref ?–125)
OSMOLALITY SERPL CALC.SUM OF ELEC: 295 MOSM/KG (ref 275–295)
OSMOLALITY SERPL CALC.SUM OF ELEC: 306 MOSM/KG (ref 275–295)
OXYHGB MFR BLDA: 96.9 % (ref 92–100)
OXYHGB MFR BLDA: 97.3 % (ref 92–100)
OXYHGB MFR BLDA: 97.6 % (ref 92–100)
OXYHGB MFR BLDA: 98.3 % (ref 92–100)
P AXIS: 67 DEGREES
P-R INTERVAL: 156 MS
P/F RATIO: 438 MMHG
PCO2 BLDA: 35 MM HG (ref 35–45)
PCO2 BLDA: 40 MM HG (ref 35–45)
PCO2 BLDA: 57 MM HG (ref 35–45)
PCO2 BLDA: 58 MM HG (ref 35–45)
PH BLDA: 7.2 [PH] (ref 7.35–7.45)
PH BLDA: 7.21 [PH] (ref 7.35–7.45)
PH BLDA: 7.3 [PH] (ref 7.35–7.45)
PH BLDA: 7.31 [PH] (ref 7.35–7.45)
PH UR STRIP.AUTO: 5 [PH] (ref 5–8)
PLATELET # BLD AUTO: 265 10(3)UL (ref 150–450)
PLATELET # BLD AUTO: 317 10(3)UL (ref 150–450)
PO2 BLDA: 158 MM HG (ref 80–100)
PO2 BLDA: 171 MM HG (ref 80–100)
PO2 BLDA: 231 MM HG (ref 80–100)
PO2 BLDA: 308 MM HG (ref 80–100)
POTASSIUM BLD-SCNC: 3.3 MMOL/L (ref 3.6–5.1)
POTASSIUM BLD-SCNC: 3.6 MMOL/L (ref 3.6–5.1)
POTASSIUM BLD-SCNC: 3.6 MMOL/L (ref 3.6–5.1)
POTASSIUM BLD-SCNC: 3.9 MMOL/L (ref 3.6–5.1)
POTASSIUM SERPL-SCNC: 3.3 MMOL/L (ref 3.5–5.1)
POTASSIUM SERPL-SCNC: 3.8 MMOL/L (ref 3.5–5.1)
PROCALCITONIN SERPL-MCNC: 1.2 NG/ML (ref ?–0.16)
PROT SERPL-MCNC: 7.9 G/DL (ref 6.4–8.2)
PROT UR STRIP.AUTO-MCNC: NEGATIVE MG/DL
PROTHROMBIN TIME: 14.1 SECONDS (ref 11.6–14.8)
Q-T INTERVAL: 274 MS
QRS DURATION: 80 MS
QTC CALCULATION (BEZET): 405 MS
R AXIS: 42 DEGREES
RBC # BLD AUTO: 4.49 X10(6)UL
RBC # BLD AUTO: 5.24 X10(6)UL
RBC #/AREA URNS AUTO: >10 /HPF
RSV RNA SPEC NAA+PROBE: NEGATIVE
SARS-COV-2 RNA RESP QL NAA+PROBE: NOT DETECTED
SARS-COV-2 RNA RESP QL NAA+PROBE: NOT DETECTED
SODIUM BLD-SCNC: 135 MMOL/L (ref 135–145)
SODIUM BLD-SCNC: 136 MMOL/L (ref 135–145)
SODIUM SERPL-SCNC: 138 MMOL/L (ref 136–145)
SODIUM SERPL-SCNC: 143 MMOL/L (ref 136–145)
SP GR UR STRIP.AUTO: >1.03 (ref 1–1.03)
STREP PNEUMO ANTIGEN, URINE: NEGATIVE
T AXIS: 75 DEGREES
TIDAL VOLUME: 450 ML
TIDAL VOLUME: 450 ML
TROPONIN I HIGH SENSITIVITY: 13 NG/L
UROBILINOGEN UR STRIP.AUTO-MCNC: <2 MG/DL
VANCOMYCIN PEAK SERPL-MCNC: 28.9 UG/ML (ref 30–50)
VENT RATE: 32 /MIN
VENT RATE: 32 /MIN
VENTRICULAR RATE: 132 BPM
WBC # BLD AUTO: 14.1 X10(3) UL (ref 4–11)
WBC # BLD AUTO: 15.5 X10(3) UL (ref 4–11)

## 2022-05-18 PROCEDURE — 71275 CT ANGIOGRAPHY CHEST: CPT | Performed by: EMERGENCY MEDICINE

## 2022-05-18 PROCEDURE — 71045 X-RAY EXAM CHEST 1 VIEW: CPT | Performed by: EMERGENCY MEDICINE

## 2022-05-18 PROCEDURE — 99223 1ST HOSP IP/OBS HIGH 75: CPT | Performed by: HOSPITALIST

## 2022-05-18 RX ORDER — PROCHLORPERAZINE EDISYLATE 5 MG/ML
5 INJECTION INTRAMUSCULAR; INTRAVENOUS EVERY 8 HOURS PRN
Status: DISCONTINUED | OUTPATIENT
Start: 2022-05-18 | End: 2022-05-24

## 2022-05-18 RX ORDER — METOPROLOL TARTRATE 5 MG/5ML
2.5 INJECTION INTRAVENOUS EVERY 6 HOURS
Status: DISCONTINUED | OUTPATIENT
Start: 2022-05-18 | End: 2022-05-19

## 2022-05-18 RX ORDER — ACETAMINOPHEN 500 MG
1000 TABLET ORAL ONCE
Status: COMPLETED | OUTPATIENT
Start: 2022-05-18 | End: 2022-05-18

## 2022-05-18 RX ORDER — VANCOMYCIN HYDROCHLORIDE
15 EVERY 24 HOURS
Status: DISCONTINUED | OUTPATIENT
Start: 2022-05-19 | End: 2022-05-18 | Stop reason: DRUGHIGH

## 2022-05-18 RX ORDER — ALBUTEROL SULFATE 2.5 MG/3ML
10 SOLUTION RESPIRATORY (INHALATION) CONTINUOUS
Status: DISCONTINUED | OUTPATIENT
Start: 2022-05-18 | End: 2022-05-18 | Stop reason: DRUGHIGH

## 2022-05-18 RX ORDER — IOHEXOL 350 MG/ML
90 INJECTION, SOLUTION INTRAVENOUS
Status: COMPLETED | OUTPATIENT
Start: 2022-05-18 | End: 2022-05-18

## 2022-05-18 RX ORDER — ALBUTEROL SULFATE 2.5 MG/3ML
2.5 SOLUTION RESPIRATORY (INHALATION)
Status: DISCONTINUED | OUTPATIENT
Start: 2022-05-18 | End: 2022-05-19

## 2022-05-18 RX ORDER — FAMOTIDINE 10 MG/ML
20 INJECTION, SOLUTION INTRAVENOUS DAILY
Status: DISCONTINUED | OUTPATIENT
Start: 2022-05-18 | End: 2022-05-21

## 2022-05-18 RX ORDER — IPRATROPIUM BROMIDE AND ALBUTEROL SULFATE 2.5; .5 MG/3ML; MG/3ML
3 SOLUTION RESPIRATORY (INHALATION)
Status: DISCONTINUED | OUTPATIENT
Start: 2022-05-18 | End: 2022-05-18

## 2022-05-18 RX ORDER — ACETAMINOPHEN 325 MG/1
650 TABLET ORAL EVERY 6 HOURS PRN
Status: DISCONTINUED | OUTPATIENT
Start: 2022-05-18 | End: 2022-05-24

## 2022-05-18 RX ORDER — OSELTAMIVIR PHOSPHATE 30 MG/1
30 CAPSULE ORAL EVERY 12 HOURS SCHEDULED
Status: DISCONTINUED | OUTPATIENT
Start: 2022-05-18 | End: 2022-05-19

## 2022-05-18 RX ORDER — FUROSEMIDE 10 MG/ML
40 INJECTION INTRAMUSCULAR; INTRAVENOUS ONCE
Status: COMPLETED | OUTPATIENT
Start: 2022-05-18 | End: 2022-05-18

## 2022-05-18 RX ORDER — FAMOTIDINE 20 MG/1
20 TABLET, FILM COATED ORAL DAILY
Status: DISCONTINUED | OUTPATIENT
Start: 2022-05-18 | End: 2022-05-24

## 2022-05-18 RX ORDER — SODIUM CHLORIDE, SODIUM LACTATE, POTASSIUM CHLORIDE, CALCIUM CHLORIDE 600; 310; 30; 20 MG/100ML; MG/100ML; MG/100ML; MG/100ML
INJECTION, SOLUTION INTRAVENOUS CONTINUOUS
Status: DISCONTINUED | OUTPATIENT
Start: 2022-05-18 | End: 2022-05-19

## 2022-05-18 RX ORDER — ONDANSETRON 2 MG/ML
4 INJECTION INTRAMUSCULAR; INTRAVENOUS EVERY 6 HOURS PRN
Status: DISCONTINUED | OUTPATIENT
Start: 2022-05-18 | End: 2022-05-24

## 2022-05-18 RX ORDER — OSELTAMIVIR PHOSPHATE 30 MG/1
30 CAPSULE ORAL EVERY 12 HOURS SCHEDULED
Status: DISCONTINUED | OUTPATIENT
Start: 2022-05-18 | End: 2022-05-18

## 2022-05-18 RX ORDER — ENOXAPARIN SODIUM 100 MG/ML
40 INJECTION SUBCUTANEOUS DAILY
Status: DISCONTINUED | OUTPATIENT
Start: 2022-05-18 | End: 2022-05-24

## 2022-05-18 RX ORDER — ALBUTEROL SULFATE 2.5 MG/3ML
2.5 SOLUTION RESPIRATORY (INHALATION) CONTINUOUS
Status: DISCONTINUED | OUTPATIENT
Start: 2022-05-18 | End: 2022-05-18

## 2022-05-18 RX ORDER — VANCOMYCIN HYDROCHLORIDE
15 EVERY 12 HOURS
Status: DISCONTINUED | OUTPATIENT
Start: 2022-05-18 | End: 2022-05-18

## 2022-05-18 RX ORDER — LABETALOL HYDROCHLORIDE 5 MG/ML
10 INJECTION, SOLUTION INTRAVENOUS ONCE
Status: COMPLETED | OUTPATIENT
Start: 2022-05-18 | End: 2022-05-18

## 2022-05-18 RX ORDER — GUAIFENESIN 600 MG
600 TABLET, EXTENDED RELEASE 12 HR ORAL 2 TIMES DAILY
Status: DISCONTINUED | OUTPATIENT
Start: 2022-05-18 | End: 2022-05-24

## 2022-05-18 RX ORDER — VANCOMYCIN 2 GRAM/500 ML IN 0.9 % SODIUM CHLORIDE INTRAVENOUS
25 ONCE
Status: COMPLETED | OUTPATIENT
Start: 2022-05-18 | End: 2022-05-18

## 2022-05-18 RX ORDER — ALBUTEROL SULFATE 2.5 MG/3ML
SOLUTION RESPIRATORY (INHALATION)
Status: DISPENSED
Start: 2022-05-18 | End: 2022-05-18

## 2022-05-18 RX ORDER — OSELTAMIVIR PHOSPHATE 75 MG/1
75 CAPSULE ORAL ONCE
Status: COMPLETED | OUTPATIENT
Start: 2022-05-18 | End: 2022-05-18

## 2022-05-18 RX ORDER — METHYLPREDNISOLONE SODIUM SUCCINATE 40 MG/ML
40 INJECTION, POWDER, LYOPHILIZED, FOR SOLUTION INTRAMUSCULAR; INTRAVENOUS EVERY 8 HOURS
Status: DISCONTINUED | OUTPATIENT
Start: 2022-05-18 | End: 2022-05-20

## 2022-05-18 NOTE — PLAN OF CARE
Pt arrived to unit from ER around 1130. Alert and oriented. Remains on AVAPs, able to take small breaks with RT this afternoon. States breathing feels slightly better, still with crackles and wheezes. Dry cough. ST. BP stable. Voiding. IV fluids per order. See flowsheets for full assessments. Plan of care discussed with pt and pt's wife at bedside.

## 2022-05-18 NOTE — ED QUICK NOTES
Orders for admission, patient is aware of plan and ready to go upstairs. Any questions, please call ED RN Kelly Gambino at extension [phone]. Patient Covid vaccination status: Fully vaccinated     COVID Test Ordered in ED: SARS-CoV-2/Flu A and B/RSV by PCR (GeneXpert)    COVID Suspicion at Admission: Low clinical suspicion for COVID    Running Infusions:      Mental Status/LOC at time of transport: AOx4    Other pertinent information: patient is influenza A positive. Patient was placed on bipap d/t desatting and increased work of breathing.  Lactic redraw due at 1128  CIWA score: N/A   NIH score:  N/A

## 2022-05-18 NOTE — PROGRESS NOTES
[facility] Dosing Service    Initial Pharmacokinetic Consult for Vancomycin AUC Dosing    Jarrod Garcia is a 60year old patient who is being treated for pneumonia. Pharmacy has been asked to dose vancomycin by Dr. Cj Rg. Weights:  Ideal body weight: 63.8 kg (140 lb 10.5 oz)  Adjusted ideal body weight: 78.2 kg (172 lb 6.3 oz)----DOSING WEIGHT  Actual weight:  99.8 kg (220 lb)    Labs:  Lab Results   Component Value Date    MARITZAERUM 1.35 05/18/2022      CrCl:  Estimated Creatinine Clearance: 50.5 mL/min (A) (based on SCr of 1.35 mg/dL (H)). Based on the above:    1. This patient has received a loading dose of vancomycin 2000 mg IVPB (25mg/kg, capped at 2000 mg) x 1 dose. 2. Vancomycin peak and trough will be obtained prior to the next dose, in order to calculate AUC24. Goal AUC24 is 400-600 mg-h/L.    3. Pharmacy will order SCr as clinically indicated while on vancomycin to assess renal function. 4. Pharmacy will follow and monitor renal function, toxicity and efficacy. We appreciate the opportunity to assist in the care of this patient.     Mehul Morrison, PharmD  5/18/2022  2:19 PM  [address] [place] [address] Extension: [phone]

## 2022-05-18 NOTE — PROGRESS NOTES
[facility] Pharmacy Note:  Renal Adjustment for Tamiflu    Namrata Erickson is a 60year old patient who has been prescribed oseltamivir (TAMIFLU) 30 mg every 12 hrs. The estimated creatinine clearance is 50.5 mL/min (A) (based on SCr of 1.35 mg/dL (H)). The dose has been adjusted to oseltamivir (TAMIFLU) 75 mg x 1 loading dose, followed by 30 mg every 12 hrs per hospital protocol for treatment of influenza. Pharmacy will follow and adjust dose as warranted for additional renal function changes.     Thank you,  Beverley Brady, PharmD  5/18/2022  12:32 PM

## 2022-05-18 NOTE — ED INITIAL ASSESSMENT (HPI)
Pt to ED c/o shortness of breath, cough, and fevers x 1 week. Pt reports worsening symptoms today. Pt arrives at 88% on room air. Patient placed on O2. Patient with audible crackles. Patient coughing up clear sputum.

## 2022-05-18 NOTE — PROGRESS NOTES
[facility] Pharmacy Note:  Initiation of Stress Ulcer Prophylaxis     Walter Farooq is a 60year old patient and meets criteria for the initiation of stress ulcer prophylaxis based on the presence of two minor risk factors including sepsis and being on a high-dose steroid (>50 mg methylprednisolone daily). Famotidine 20 mg IV/PO once daily has been initiated per pharmacy protocol for a CrCl of 50.5 mL/min.     Thank you,  Neo Diaz, PharmD  5/18/2022 12:52 PM

## 2022-05-18 NOTE — ED QUICK NOTES
Pt becoming increasingly short of breath and dropping O2 sats to 85% on 6L. Patient also having increased work of breathing. ERMD notified. Order received to place patient on bipap. RT notified.

## 2022-05-19 ENCOUNTER — APPOINTMENT (OUTPATIENT)
Dept: GENERAL RADIOLOGY | Facility: HOSPITAL | Age: 62
End: 2022-05-19
Attending: INTERNAL MEDICINE
Payer: COMMERCIAL

## 2022-05-19 ENCOUNTER — APPOINTMENT (OUTPATIENT)
Dept: ULTRASOUND IMAGING | Facility: HOSPITAL | Age: 62
End: 2022-05-19
Attending: PHYSICIAN ASSISTANT
Payer: COMMERCIAL

## 2022-05-19 LAB
ALBUMIN SERPL-MCNC: 2.6 G/DL (ref 3.4–5)
ALBUMIN/GLOB SERPL: 0.7 {RATIO} (ref 1–2)
ALP LIVER SERPL-CCNC: 49 U/L
ALT SERPL-CCNC: 24 U/L
ANION GAP SERPL CALC-SCNC: 7 MMOL/L (ref 0–18)
ARTERIAL PATENCY WRIST A: POSITIVE
AST SERPL-CCNC: 28 U/L (ref 15–37)
BASE EXCESS BLDA CALC-SCNC: 0.1 MMOL/L (ref ?–2)
BASOPHILS # BLD AUTO: 0.04 X10(3) UL (ref 0–0.2)
BASOPHILS NFR BLD AUTO: 0.3 %
BILIRUB SERPL-MCNC: 0.4 MG/DL (ref 0.1–2)
BODY TEMPERATURE: 98.6 F
BUN BLD-MCNC: 22 MG/DL (ref 7–18)
CA-I BLD-SCNC: 1.14 MMOL/L (ref 0.95–1.32)
CALCIUM BLD-MCNC: 8 MG/DL (ref 8.5–10.1)
CHLORIDE SERPL-SCNC: 111 MMOL/L (ref 98–112)
CO2 SERPL-SCNC: 24 MMOL/L (ref 21–32)
COHGB MFR BLD: 1.3 % SAT (ref 0–3)
CREAT BLD-MCNC: 1.04 MG/DL
EOSINOPHIL # BLD AUTO: 0 X10(3) UL (ref 0–0.7)
EOSINOPHIL NFR BLD AUTO: 0 %
ERYTHROCYTE [DISTWIDTH] IN BLOOD BY AUTOMATED COUNT: 13.8 %
EST. AVERAGE GLUCOSE BLD GHB EST-MCNC: 131 MG/DL (ref 68–126)
EXPIRATORY PRESSURE: 5 CM H2O
FIO2: 30 %
GLOBULIN PLAS-MCNC: 3.5 G/DL (ref 2.8–4.4)
GLUCOSE BLD-MCNC: 165 MG/DL (ref 70–99)
HBA1C MFR BLD: 6.2 % (ref ?–5.7)
HCO3 BLDA-SCNC: 25 MEQ/L (ref 21–27)
HCT VFR BLD AUTO: 41.9 %
HGB BLD-MCNC: 13.1 G/DL
HGB BLD-MCNC: 13.1 G/DL
IMM GRANULOCYTES # BLD AUTO: 0.1 X10(3) UL (ref 0–1)
IMM GRANULOCYTES NFR BLD: 0.6 %
IPAP MAX: 35 CM H2O
IPAP MIN: 15 CM H2O
LACTATE BLD-SCNC: 1.4 MMOL/L (ref 0.5–2)
LYMPHOCYTES # BLD AUTO: 0.65 X10(3) UL (ref 1–4)
LYMPHOCYTES NFR BLD AUTO: 4.1 %
MCH RBC QN AUTO: 29.6 PG (ref 26–34)
MCHC RBC AUTO-ENTMCNC: 31.3 G/DL (ref 31–37)
MCV RBC AUTO: 94.6 FL
METHGB MFR BLD: 0.8 % SAT (ref 0.4–1.5)
MONOCYTES # BLD AUTO: 0.38 X10(3) UL (ref 0.1–1)
MONOCYTES NFR BLD AUTO: 2.4 %
NEUTROPHILS # BLD AUTO: 14.51 X10 (3) UL (ref 1.5–7.7)
NEUTROPHILS # BLD AUTO: 14.51 X10(3) UL (ref 1.5–7.7)
NEUTROPHILS NFR BLD AUTO: 92.6 %
OSMOLALITY SERPL CALC.SUM OF ELEC: 301 MOSM/KG (ref 275–295)
OXYHGB MFR BLDA: 96.6 % (ref 92–100)
P/F RATIO: 273 MMHG
PCO2 BLDA: 39 MM HG (ref 35–45)
PH BLDA: 7.41 [PH] (ref 7.35–7.45)
PLATELET # BLD AUTO: 238 10(3)UL (ref 150–450)
PO2 BLDA: 82 MM HG (ref 80–100)
POTASSIUM BLD-SCNC: 3.4 MMOL/L (ref 3.6–5.1)
POTASSIUM SERPL-SCNC: 3.4 MMOL/L (ref 3.5–5.1)
POTASSIUM SERPL-SCNC: 3.4 MMOL/L (ref 3.5–5.1)
PROT SERPL-MCNC: 6.1 G/DL (ref 6.4–8.2)
RBC # BLD AUTO: 4.43 X10(6)UL
SODIUM BLD-SCNC: 137 MMOL/L (ref 135–145)
SODIUM SERPL-SCNC: 142 MMOL/L (ref 136–145)
TIDAL VOLUME: 450 ML
VENT RATE: 32 /MIN
WBC # BLD AUTO: 15.7 X10(3) UL (ref 4–11)

## 2022-05-19 PROCEDURE — 99233 SBSQ HOSP IP/OBS HIGH 50: CPT | Performed by: HOSPITALIST

## 2022-05-19 PROCEDURE — 93971 EXTREMITY STUDY: CPT | Performed by: PHYSICIAN ASSISTANT

## 2022-05-19 PROCEDURE — 93970 EXTREMITY STUDY: CPT | Performed by: PHYSICIAN ASSISTANT

## 2022-05-19 PROCEDURE — 71045 X-RAY EXAM CHEST 1 VIEW: CPT | Performed by: INTERNAL MEDICINE

## 2022-05-19 RX ORDER — POTASSIUM CHLORIDE 20 MEQ/1
40 TABLET, EXTENDED RELEASE ORAL ONCE
Status: COMPLETED | OUTPATIENT
Start: 2022-05-19 | End: 2022-05-19

## 2022-05-19 RX ORDER — ALBUTEROL SULFATE 2.5 MG/3ML
2.5 SOLUTION RESPIRATORY (INHALATION)
Status: DISCONTINUED | OUTPATIENT
Start: 2022-05-19 | End: 2022-05-23

## 2022-05-19 RX ORDER — PREGABALIN 75 MG/1
150 CAPSULE ORAL 2 TIMES DAILY
Status: DISCONTINUED | OUTPATIENT
Start: 2022-05-19 | End: 2022-05-24

## 2022-05-19 RX ORDER — ATENOLOL 25 MG/1
25 TABLET ORAL DAILY
Status: DISCONTINUED | OUTPATIENT
Start: 2022-05-19 | End: 2022-05-22

## 2022-05-19 RX ORDER — OSELTAMIVIR PHOSPHATE 75 MG/1
75 CAPSULE ORAL EVERY 12 HOURS SCHEDULED
Status: DISCONTINUED | OUTPATIENT
Start: 2022-05-19 | End: 2022-05-23 | Stop reason: ALTCHOICE

## 2022-05-19 NOTE — PROGRESS NOTES
Pharmacy Note: Renal dose adjustment   Marleny Gabriel was originally prescribed Tamiflu 75mg every 12 hours  which was renally dose adjusted at the time of the original order per P&T approved renal dosing protocol. Renal function has now improved. Estimated Creatinine Clearance: 65.6 mL/min (based on SCr of 1.04 mg/dL). His calculated creatinine clearance is now > 60 mL/min, therefore, the dose has been changed back to the original order per P&T approved protocol.       Thank you,   Héctor Dougherty, PharmD  5/19/2022 9:51 AM

## 2022-05-19 NOTE — PLAN OF CARE
Pt a/o x4. Denies pain. AVAPS weaned to 2L HFNC, well tolerated. Dyspnea and tachypnea with activity. Slowly resolves at rest. Congested cough noted. Intermittent expiratory wheezes. Neb tx orders in place. Course lung sounds throughout. VSS. ST observed to bedside monitor. LUE swelling. US LUE/BLE (-) DVT, see results. Tolerating regular diet. Up to bathroom with assist. Voiding. Plan of care discussed at bedside.

## 2022-05-19 NOTE — PROGRESS NOTES
05/18/22 2120   BiPAP   $ RT Standby Charge (per 15 min) 1   Device V60   BiPAP / CPAP CE# [id number]   BiPAP bacteria filter Yes   BiPAP Pre-use check ok? Yes   Mode AVAPS   Interface Full face mask   Mask Size Medium   Control Settings   Inspiratory time 0.7   Insp rise time 3   AVAPS   Min IPAP 15   Max IPAP 35   EPAP Level 5   Set rate 32   Tidal volume 450   SIPAP   FiO2 (%) 30 %   BiPAP/CPAP Alarm Settings   Hi Rate 40   Low Rate 10   Hi VT 1500   Low    Hi Pressure 40   Low Pressure 5   Low Pressure Delay 20   Low MV 2   BiPAP/CPAP Monitored Parameters   Pulse 110   SpO2 97 %   PIP 13   Total Rate 35 breaths/min   Minute Volume 13   Tidal Volume 465   Total Leak 33   Trigger % 40   Ti/Ttot % 40   Toleration Well     Patient currently on AVAPS with above settings. Albuterol nebs given every 2 hours as ordered. Patient coughing up thick yellow secretions. Sputum specimen sent to lab.  Short breaks allowed off AVAPS if patient can tolerate per MD.

## 2022-05-20 ENCOUNTER — APPOINTMENT (OUTPATIENT)
Dept: CV DIAGNOSTICS | Facility: HOSPITAL | Age: 62
End: 2022-05-20
Attending: HOSPITALIST
Payer: COMMERCIAL

## 2022-05-20 ENCOUNTER — APPOINTMENT (OUTPATIENT)
Dept: GENERAL RADIOLOGY | Facility: HOSPITAL | Age: 62
End: 2022-05-20
Attending: HOSPITALIST
Payer: COMMERCIAL

## 2022-05-20 LAB
ANION GAP SERPL CALC-SCNC: 5 MMOL/L (ref 0–18)
ARTERIAL PATENCY WRIST A: POSITIVE
BASE EXCESS BLDA CALC-SCNC: 0.3 MMOL/L (ref ?–2)
BASOPHILS # BLD AUTO: 0.03 X10(3) UL (ref 0–0.2)
BASOPHILS NFR BLD AUTO: 0.2 %
BODY TEMPERATURE: 98.6 F
BUN BLD-MCNC: 21 MG/DL (ref 7–18)
CA-I BLD-SCNC: 1.21 MMOL/L (ref 0.95–1.32)
CALCIUM BLD-MCNC: 8.1 MG/DL (ref 8.5–10.1)
CHLORIDE SERPL-SCNC: 112 MMOL/L (ref 98–112)
CO2 SERPL-SCNC: 25 MMOL/L (ref 21–32)
COHGB MFR BLD: 1.3 % SAT (ref 0–3)
CREAT BLD-MCNC: 0.86 MG/DL
EOSINOPHIL # BLD AUTO: 0 X10(3) UL (ref 0–0.7)
EOSINOPHIL NFR BLD AUTO: 0 %
ERYTHROCYTE [DISTWIDTH] IN BLOOD BY AUTOMATED COUNT: 14 %
GLUCOSE BLD-MCNC: 130 MG/DL (ref 70–99)
HCO3 BLDA-SCNC: 25.2 MEQ/L (ref 21–27)
HCT VFR BLD AUTO: 40.9 %
HGB BLD-MCNC: 12.5 G/DL
HGB BLD-MCNC: 14.1 G/DL
IMM GRANULOCYTES # BLD AUTO: 0.19 X10(3) UL (ref 0–1)
IMM GRANULOCYTES NFR BLD: 1 %
L/M: 5 L/MIN
LACTATE BLD-SCNC: 1.1 MMOL/L (ref 0.5–2)
LYMPHOCYTES # BLD AUTO: 1.1 X10(3) UL (ref 1–4)
LYMPHOCYTES NFR BLD AUTO: 5.7 %
MCH RBC QN AUTO: 29.1 PG (ref 26–34)
MCHC RBC AUTO-ENTMCNC: 30.6 G/DL (ref 31–37)
MCV RBC AUTO: 95.3 FL
METHGB MFR BLD: 0.8 % SAT (ref 0.4–1.5)
MONOCYTES # BLD AUTO: 0.92 X10(3) UL (ref 0.1–1)
MONOCYTES NFR BLD AUTO: 4.8 %
NEUTROPHILS # BLD AUTO: 16.99 X10 (3) UL (ref 1.5–7.7)
NEUTROPHILS # BLD AUTO: 16.99 X10(3) UL (ref 1.5–7.7)
NEUTROPHILS NFR BLD AUTO: 88.3 %
NT-PROBNP SERPL-MCNC: 327 PG/ML (ref ?–125)
OSMOLALITY SERPL CALC.SUM OF ELEC: 299 MOSM/KG (ref 275–295)
OXYHGB MFR BLDA: 97.5 % (ref 92–100)
PCO2 BLDA: 42 MM HG (ref 35–45)
PH BLDA: 7.39 [PH] (ref 7.35–7.45)
PLATELET # BLD AUTO: 274 10(3)UL (ref 150–450)
PO2 BLDA: 109 MM HG (ref 80–100)
POTASSIUM BLD-SCNC: 3.9 MMOL/L (ref 3.6–5.1)
POTASSIUM SERPL-SCNC: 4.1 MMOL/L (ref 3.5–5.1)
POTASSIUM SERPL-SCNC: 4.1 MMOL/L (ref 3.5–5.1)
RBC # BLD AUTO: 4.29 X10(6)UL
SODIUM BLD-SCNC: 140 MMOL/L (ref 135–145)
SODIUM SERPL-SCNC: 142 MMOL/L (ref 136–145)
VANCOMYCIN PEAK SERPL-MCNC: 21.3 UG/ML (ref 30–50)
VANCOMYCIN TROUGH SERPL-MCNC: 15.7 UG/ML (ref 10–20)
WBC # BLD AUTO: 19.2 X10(3) UL (ref 4–11)

## 2022-05-20 PROCEDURE — 93306 TTE W/DOPPLER COMPLETE: CPT | Performed by: HOSPITALIST

## 2022-05-20 PROCEDURE — 99233 SBSQ HOSP IP/OBS HIGH 50: CPT | Performed by: HOSPITALIST

## 2022-05-20 PROCEDURE — 71045 X-RAY EXAM CHEST 1 VIEW: CPT | Performed by: HOSPITALIST

## 2022-05-20 RX ORDER — VANCOMYCIN 2 GRAM/500 ML IN 0.9 % SODIUM CHLORIDE INTRAVENOUS
2000
Status: DISCONTINUED | OUTPATIENT
Start: 2022-05-20 | End: 2022-05-22

## 2022-05-20 RX ORDER — FUROSEMIDE 10 MG/ML
40 INJECTION INTRAMUSCULAR; INTRAVENOUS ONCE
Status: COMPLETED | OUTPATIENT
Start: 2022-05-20 | End: 2022-05-20

## 2022-05-20 RX ORDER — PREDNISONE 20 MG/1
40 TABLET ORAL
Status: COMPLETED | OUTPATIENT
Start: 2022-05-20 | End: 2022-05-21

## 2022-05-20 NOTE — PLAN OF CARE
Resumed care at 0730. Pt on 5L NC, weaned to 1L NC. Educated on IS and getting into chair. Stand by assist to bathroom, ambulatory.      Problem: Patient/Family Goals  Goal: Patient/Family Long Term Goal  Description: Patient's Long Term Goal: To go home    Interventions:  - PT/OT  - Off oxygen  - Decrease respiratory symptoms/SOB  - See additional Care Plan goals for specific interventions  Outcome: Progressing  Goal: Patient/Family Short Term Goal  Description: Patient's Short Term Goal: To get off oxygen    Interventions:   - IS  - Ambulating, OOB  - Wean off oxygen as tolerated  - See additional Care Plan goals for specific interventions  Outcome: Progressing     Problem: RESPIRATORY - ADULT  Goal: Achieves optimal ventilation and oxygenation  Description: INTERVENTIONS:  - Assess for changes in respiratory status  - Assess for changes in mentation and behavior  - Position to facilitate oxygenation and minimize respiratory effort  - Oxygen supplementation based on oxygen saturation or ABGs  - Provide Smoking Cessation handout, if applicable  - Encourage broncho-pulmonary hygiene including cough, deep breathe, Incentive Spirometry  - Assess the need for suctioning and perform as needed  - Assess and instruct to report SOB or any respiratory difficulty  - Respiratory Therapy support as indicated  - Manage/alleviate anxiety  - Monitor for signs/symptoms of CO2 retention  Outcome: Progressing     Problem: GASTROINTESTINAL - ADULT  Goal: Minimal or absence of nausea and vomiting  Description: INTERVENTIONS:  - Maintain adequate hydration with IV or PO as ordered and tolerated  - Nasogastric tube to low intermittent suction as ordered  - Evaluate effectiveness of ordered antiemetic medications  - Provide nonpharmacologic comfort measures as appropriate  - Advance diet as tolerated, if ordered  - Obtain nutritional consult as needed  - Evaluate fluid balance  Outcome: Progressing     Problem: METABOLIC/FLUID AND ELECTROLYTES - ADULT  Goal: Electrolytes maintained within normal limits  Description: INTERVENTIONS:  - Monitor labs and rhythm and assess patient for signs and symptoms of electrolyte imbalances  - Administer electrolyte replacement as ordered  - Monitor response to electrolyte replacements, including rhythm and repeat lab results as appropriate  - Fluid restriction as ordered  - Instruct patient on fluid and nutrition restrictions as appropriate  Outcome: Progressing     Problem: HEMATOLOGIC - ADULT  Goal: Free from bleeding injury  Description: (Example usage: patient with low platelets)  INTERVENTIONS:  - Avoid intramuscular injections, enemas and rectal medication administration  - Ensure safe mobilization of patient  - Hold pressure on venipuncture sites to achieve adequate hemostasis  - Assess for signs and symptoms of internal bleeding  - Monitor lab trends  - Patient is to report abnormal signs of bleeding to staff  - Avoid use of toothpicks and dental floss  - Use electric shaver for shaving  - Use soft bristle tooth brush  - Limit straining and forceful nose blowing  Outcome: Progressing

## 2022-05-21 LAB
ALBUMIN SERPL-MCNC: 2.6 G/DL (ref 3.4–5)
ALBUMIN/GLOB SERPL: 0.7 {RATIO} (ref 1–2)
ALP LIVER SERPL-CCNC: 159 U/L
ALT SERPL-CCNC: 37 U/L
ANION GAP SERPL CALC-SCNC: 5 MMOL/L (ref 0–18)
ARTERIAL PATENCY WRIST A: POSITIVE
AST SERPL-CCNC: 39 U/L (ref 15–37)
BASE EXCESS BLDA CALC-SCNC: 3.2 MMOL/L (ref ?–2)
BASOPHILS # BLD AUTO: 0.02 X10(3) UL (ref 0–0.2)
BASOPHILS NFR BLD AUTO: 0.1 %
BILIRUB SERPL-MCNC: 0.2 MG/DL (ref 0.1–2)
BODY TEMPERATURE: 98.6 F
BUN BLD-MCNC: 35 MG/DL (ref 7–18)
CA-I BLD-SCNC: 1.2 MMOL/L (ref 0.95–1.32)
CALCIUM BLD-MCNC: 8.6 MG/DL (ref 8.5–10.1)
CHLORIDE SERPL-SCNC: 113 MMOL/L (ref 98–112)
CO2 SERPL-SCNC: 27 MMOL/L (ref 21–32)
COHGB MFR BLD: 1.5 % SAT (ref 0–3)
CREAT BLD-MCNC: 0.98 MG/DL
EOSINOPHIL # BLD AUTO: 0 X10(3) UL (ref 0–0.7)
EOSINOPHIL NFR BLD AUTO: 0 %
ERYTHROCYTE [DISTWIDTH] IN BLOOD BY AUTOMATED COUNT: 14 %
GLOBULIN PLAS-MCNC: 3.6 G/DL (ref 2.8–4.4)
GLUCOSE BLD-MCNC: 122 MG/DL (ref 70–99)
HCO3 BLDA-SCNC: 27.4 MEQ/L (ref 21–27)
HCT VFR BLD AUTO: 41.5 %
HGB BLD-MCNC: 12.5 G/DL
HGB BLD-MCNC: 13.6 G/DL
IMM GRANULOCYTES # BLD AUTO: 0.17 X10(3) UL (ref 0–1)
IMM GRANULOCYTES NFR BLD: 1.1 %
L/M: 2 L/MIN
LACTATE BLD-SCNC: 1.2 MMOL/L (ref 0.5–2)
LYMPHOCYTES # BLD AUTO: 2.33 X10(3) UL (ref 1–4)
LYMPHOCYTES NFR BLD AUTO: 14.5 %
MCH RBC QN AUTO: 28.3 PG (ref 26–34)
MCHC RBC AUTO-ENTMCNC: 30.1 G/DL (ref 31–37)
MCV RBC AUTO: 94.1 FL
METHGB MFR BLD: 1.1 % SAT (ref 0.4–1.5)
MONOCYTES # BLD AUTO: 1.04 X10(3) UL (ref 0.1–1)
MONOCYTES NFR BLD AUTO: 6.5 %
NEUTROPHILS # BLD AUTO: 12.48 X10 (3) UL (ref 1.5–7.7)
NEUTROPHILS # BLD AUTO: 12.48 X10(3) UL (ref 1.5–7.7)
NEUTROPHILS NFR BLD AUTO: 77.8 %
OSMOLALITY SERPL CALC.SUM OF ELEC: 309 MOSM/KG (ref 275–295)
OXYHGB MFR BLDA: 94.9 % (ref 92–100)
PCO2 BLDA: 37 MM HG (ref 35–45)
PH BLDA: 7.47 [PH] (ref 7.35–7.45)
PLATELET # BLD AUTO: 296 10(3)UL (ref 150–450)
PO2 BLDA: 75 MM HG (ref 80–100)
POTASSIUM BLD-SCNC: 3.7 MMOL/L (ref 3.6–5.1)
POTASSIUM SERPL-SCNC: 3.8 MMOL/L (ref 3.5–5.1)
PROT SERPL-MCNC: 6.2 G/DL (ref 6.4–8.2)
RBC # BLD AUTO: 4.41 X10(6)UL
SODIUM BLD-SCNC: 141 MMOL/L (ref 135–145)
SODIUM SERPL-SCNC: 145 MMOL/L (ref 136–145)
WBC # BLD AUTO: 16 X10(3) UL (ref 4–11)

## 2022-05-21 PROCEDURE — 99232 SBSQ HOSP IP/OBS MODERATE 35: CPT | Performed by: HOSPITALIST

## 2022-05-21 RX ORDER — BENZONATATE 100 MG/1
100 CAPSULE ORAL 3 TIMES DAILY
Status: DISCONTINUED | OUTPATIENT
Start: 2022-05-21 | End: 2022-05-24

## 2022-05-21 RX ORDER — FUROSEMIDE 10 MG/ML
20 INJECTION INTRAMUSCULAR; INTRAVENOUS ONCE
Status: COMPLETED | OUTPATIENT
Start: 2022-05-21 | End: 2022-05-21

## 2022-05-21 RX ORDER — METHYLPREDNISOLONE SODIUM SUCCINATE 125 MG/2ML
60 INJECTION, POWDER, LYOPHILIZED, FOR SOLUTION INTRAMUSCULAR; INTRAVENOUS EVERY 12 HOURS
Status: COMPLETED | OUTPATIENT
Start: 2022-05-21 | End: 2022-05-22

## 2022-05-21 NOTE — PLAN OF CARE
Assumed care of pt @ 1930. Rec'd pt in chair, resting. Pt. Ambulated to BR with minimal assistance. Pt. MORRELL, recovers well once back in chair.

## 2022-05-21 NOTE — PLAN OF CARE
Resumed care at 0730. Ambulatory to bathroom, up to chair. On 1L NC, weaned to room air.      Problem: Patient/Family Goals  Goal: Patient/Family Long Term Goal  Description: Patient's Long Term Goal: To go home    Interventions:  - PT/OT  - Off oxygen  - Decrease respiratory symptoms/SOB  - See additional Care Plan goals for specific interventions  Outcome: Progressing  Goal: Patient/Family Short Term Goal  Description: Patient's Short Term Goal: To get off oxygen    Interventions:   - IS  - Ambulating, OOB  - Wean off oxygen as tolerated  - See additional Care Plan goals for specific interventions  Outcome: Progressing     Problem: RESPIRATORY - ADULT  Goal: Achieves optimal ventilation and oxygenation  Description: INTERVENTIONS:  - Assess for changes in respiratory status  - Assess for changes in mentation and behavior  - Position to facilitate oxygenation and minimize respiratory effort  - Oxygen supplementation based on oxygen saturation or ABGs  - Provide Smoking Cessation handout, if applicable  - Encourage broncho-pulmonary hygiene including cough, deep breathe, Incentive Spirometry  - Assess the need for suctioning and perform as needed  - Assess and instruct to report SOB or any respiratory difficulty  - Respiratory Therapy support as indicated  - Manage/alleviate anxiety  - Monitor for signs/symptoms of CO2 retention  Outcome: Progressing     Problem: GASTROINTESTINAL - ADULT  Goal: Minimal or absence of nausea and vomiting  Description: INTERVENTIONS:  - Maintain adequate hydration with IV or PO as ordered and tolerated  - Nasogastric tube to low intermittent suction as ordered  - Evaluate effectiveness of ordered antiemetic medications  - Provide nonpharmacologic comfort measures as appropriate  - Advance diet as tolerated, if ordered  - Obtain nutritional consult as needed  - Evaluate fluid balance  Outcome: Progressing     Problem: METABOLIC/FLUID AND ELECTROLYTES - ADULT  Goal: Electrolytes maintained within normal limits  Description: INTERVENTIONS:  - Monitor labs and rhythm and assess patient for signs and symptoms of electrolyte imbalances  - Administer electrolyte replacement as ordered  - Monitor response to electrolyte replacements, including rhythm and repeat lab results as appropriate  - Fluid restriction as ordered  - Instruct patient on fluid and nutrition restrictions as appropriate  Outcome: Progressing     Problem: HEMATOLOGIC - ADULT  Goal: Free from bleeding injury  Description: (Example usage: patient with low platelets)  INTERVENTIONS:  - Avoid intramuscular injections, enemas and rectal medication administration  - Ensure safe mobilization of patient  - Hold pressure on venipuncture sites to achieve adequate hemostasis  - Assess for signs and symptoms of internal bleeding  - Monitor lab trends  - Patient is to report abnormal signs of bleeding to staff  - Avoid use of toothpicks and dental floss  - Use electric shaver for shaving  - Use soft bristle tooth brush  - Limit straining and forceful nose blowing  Outcome: Progressing

## 2022-05-22 LAB
ANION GAP SERPL CALC-SCNC: 4 MMOL/L (ref 0–18)
BASOPHILS # BLD AUTO: 0.03 X10(3) UL (ref 0–0.2)
BASOPHILS NFR BLD AUTO: 0.3 %
BUN BLD-MCNC: 23 MG/DL (ref 7–18)
CALCIUM BLD-MCNC: 8.8 MG/DL (ref 8.5–10.1)
CHLORIDE SERPL-SCNC: 109 MMOL/L (ref 98–112)
CO2 SERPL-SCNC: 28 MMOL/L (ref 21–32)
CREAT BLD-MCNC: 0.81 MG/DL
EOSINOPHIL # BLD AUTO: 0 X10(3) UL (ref 0–0.7)
EOSINOPHIL NFR BLD AUTO: 0 %
ERYTHROCYTE [DISTWIDTH] IN BLOOD BY AUTOMATED COUNT: 13.6 %
GLUCOSE BLD-MCNC: 147 MG/DL (ref 70–99)
HCT VFR BLD AUTO: 44.2 %
HGB BLD-MCNC: 13.7 G/DL
IMM GRANULOCYTES # BLD AUTO: 0.19 X10(3) UL (ref 0–1)
IMM GRANULOCYTES NFR BLD: 1.8 %
LYMPHOCYTES # BLD AUTO: 1.25 X10(3) UL (ref 1–4)
LYMPHOCYTES NFR BLD AUTO: 11.9 %
MAGNESIUM SERPL-MCNC: 2.5 MG/DL (ref 1.6–2.6)
MCH RBC QN AUTO: 28.6 PG (ref 26–34)
MCHC RBC AUTO-ENTMCNC: 31 G/DL (ref 31–37)
MCV RBC AUTO: 92.3 FL
MONOCYTES # BLD AUTO: 0.61 X10(3) UL (ref 0.1–1)
MONOCYTES NFR BLD AUTO: 5.8 %
NEUTROPHILS # BLD AUTO: 8.46 X10 (3) UL (ref 1.5–7.7)
NEUTROPHILS # BLD AUTO: 8.46 X10(3) UL (ref 1.5–7.7)
NEUTROPHILS NFR BLD AUTO: 80.2 %
OSMOLALITY SERPL CALC.SUM OF ELEC: 298 MOSM/KG (ref 275–295)
PHOSPHATE SERPL-MCNC: 2 MG/DL (ref 2.5–4.9)
PLATELET # BLD AUTO: 312 10(3)UL (ref 150–450)
POTASSIUM SERPL-SCNC: 3.6 MMOL/L (ref 3.5–5.1)
RBC # BLD AUTO: 4.79 X10(6)UL
SODIUM SERPL-SCNC: 141 MMOL/L (ref 136–145)
WBC # BLD AUTO: 10.5 X10(3) UL (ref 4–11)

## 2022-05-22 PROCEDURE — 99233 SBSQ HOSP IP/OBS HIGH 50: CPT | Performed by: HOSPITALIST

## 2022-05-22 RX ORDER — HYDRALAZINE HYDROCHLORIDE 20 MG/ML
10 INJECTION INTRAMUSCULAR; INTRAVENOUS EVERY 6 HOURS PRN
Status: DISCONTINUED | OUTPATIENT
Start: 2022-05-22 | End: 2022-05-24

## 2022-05-22 RX ORDER — ATENOLOL 25 MG/1
25 TABLET ORAL DAILY
Status: DISCONTINUED | OUTPATIENT
Start: 2022-05-22 | End: 2022-05-24

## 2022-05-22 RX ORDER — LOSARTAN POTASSIUM 50 MG/1
50 TABLET ORAL ONCE
Status: COMPLETED | OUTPATIENT
Start: 2022-05-22 | End: 2022-05-22

## 2022-05-22 RX ORDER — AMLODIPINE BESYLATE 5 MG/1
5 TABLET ORAL DAILY
Status: DISCONTINUED | OUTPATIENT
Start: 2022-05-22 | End: 2022-05-22

## 2022-05-22 RX ORDER — LOSARTAN POTASSIUM 100 MG/1
100 TABLET ORAL DAILY
Status: DISCONTINUED | OUTPATIENT
Start: 2022-05-23 | End: 2022-05-23

## 2022-05-22 RX ORDER — LOSARTAN POTASSIUM 50 MG/1
50 TABLET ORAL DAILY
Status: DISCONTINUED | OUTPATIENT
Start: 2022-05-22 | End: 2022-05-22

## 2022-05-22 RX ORDER — PREDNISONE 20 MG/1
20 TABLET ORAL 2 TIMES DAILY WITH MEALS
Status: DISCONTINUED | OUTPATIENT
Start: 2022-05-22 | End: 2022-05-24

## 2022-05-22 RX ORDER — ATENOLOL 50 MG/1
50 TABLET ORAL DAILY
Status: DISCONTINUED | OUTPATIENT
Start: 2022-05-22 | End: 2022-05-22

## 2022-05-22 RX ORDER — LORAZEPAM 2 MG/ML
0.5 INJECTION INTRAMUSCULAR ONCE
Status: COMPLETED | OUTPATIENT
Start: 2022-05-22 | End: 2022-05-22

## 2022-05-22 NOTE — PLAN OF CARE
Received patient following report. Transfer orders remain. Patient up in chair. VSS. Denies pain. 1L NC. Ambulates to bathroom with standby. 1 loose BM. See flowsheets and MAR. Report given to [facility]. Patient sent with belongings.

## 2022-05-22 NOTE — PROGRESS NOTES
NURSING TRANSFER NOTE      Patient admitted via [address] to room. Safety precautions initiated. Bed in low position. Call light in reach. Assumed care for this patient at 0330. Patient alert and oriented x4. Up with standby. NSR on tele. Maintaining o2 sats >90% on RA. Denies pain. Updated patient on POC, no questions at this time.

## 2022-05-22 NOTE — PLAN OF CARE
Problem: Acute hypoexemic respiratory failure    Assessment: A&Ox4, can be anxious. BP persistently elevated during this shift. Afebrile. Weaned to RA at rest with O2 sats >92%. Walked in the hallways multiple times on RA with spO2 >90%. Audible crackling, wet breath sounds noted. Expiratory wheezes and rhonchi on auscultation. Nonproductive cough. Intermittent mild hand tremors. Not sleeping well. NSR/ST on tele. Denies pain, n/v/d. Endorses soreness in abdominal muscles d/t coughing. Having soft BMs. Ambulates with standby assist.      Intervention: Pulm on consult. On IV rocephin. Vanco dc'ed. . Tele. PRN meds for BP control. Po steroid taper. IS & flutter valve. Lovenox. Clustering care. Fall precautions in place. Hourly rounding. Education:  Safety. Plan of care. Medications. BP control. IS and flutter valve. Frequent ambulation. Response: Patient and pt's wife at bedside verbalized understanding.     Problem: Patient/Family Goals  Goal: Patient/Family Long Term Goal  Description: Patient's Long Term Goal: To go home    Interventions:  - PT/OT  - Off oxygen  - Decrease respiratory symptoms/SOB  - See additional Care Plan goals for specific interventions  Outcome: Progressing  Goal: Patient/Family Short Term Goal  Description: Patient's Short Term Goal: Return to baseline on RA    Interventions:   - IS  - Ambulating, OOB  - Wean off oxygen as tolerated  - See additional Care Plan goals for specific interventions  Outcome: Progressing     Problem: RESPIRATORY - ADULT  Goal: Achieves optimal ventilation and oxygenation  Description: INTERVENTIONS:  - Assess for changes in respiratory status  - Assess for changes in mentation and behavior  - Position to facilitate oxygenation and minimize respiratory effort  - Oxygen supplementation based on oxygen saturation or ABGs  - Provide Smoking Cessation handout, if applicable  - Encourage broncho-pulmonary hygiene including cough, deep breathe, Incentive Spirometry  - Assess the need for suctioning and perform as needed  - Assess and instruct to report SOB or any respiratory difficulty  - Respiratory Therapy support as indicated  - Manage/alleviate anxiety  - Monitor for signs/symptoms of CO2 retention  Outcome: Progressing     Problem: GASTROINTESTINAL - ADULT  Goal: Minimal or absence of nausea and vomiting  Description: INTERVENTIONS:  - Maintain adequate hydration with IV or PO as ordered and tolerated  - Nasogastric tube to low intermittent suction as ordered  - Evaluate effectiveness of ordered antiemetic medications  - Provide nonpharmacologic comfort measures as appropriate  - Advance diet as tolerated, if ordered  - Obtain nutritional consult as needed  - Evaluate fluid balance  Outcome: Progressing     Problem: METABOLIC/FLUID AND ELECTROLYTES - ADULT  Goal: Electrolytes maintained within normal limits  Description: INTERVENTIONS:  - Monitor labs and rhythm and assess patient for signs and symptoms of electrolyte imbalances  - Administer electrolyte replacement as ordered  - Monitor response to electrolyte replacements, including rhythm and repeat lab results as appropriate  - Fluid restriction as ordered  - Instruct patient on fluid and nutrition restrictions as appropriate  Outcome: Progressing     Problem: HEMATOLOGIC - ADULT  Goal: Free from bleeding injury  Description: (Example usage: patient with low platelets)  INTERVENTIONS:  - Avoid intramuscular injections, enemas and rectal medication administration  - Ensure safe mobilization of patient  - Hold pressure on venipuncture sites to achieve adequate hemostasis  - Assess for signs and symptoms of internal bleeding  - Monitor lab trends  - Patient is to report abnormal signs of bleeding to staff  - Avoid use of toothpicks and dental floss  - Use electric shaver for shaving  - Use soft bristle tooth brush  - Limit straining and forceful nose blowing  Outcome: Progressing

## 2022-05-23 LAB
ALBUMIN SERPL-MCNC: 2.7 G/DL (ref 3.4–5)
ALBUMIN/GLOB SERPL: 0.8 {RATIO} (ref 1–2)
ALP LIVER SERPL-CCNC: 53 U/L
ALT SERPL-CCNC: 67 U/L
ANION GAP SERPL CALC-SCNC: 4 MMOL/L (ref 0–18)
AST SERPL-CCNC: 33 U/L (ref 15–37)
BASOPHILS # BLD AUTO: 0.1 X10(3) UL (ref 0–0.2)
BASOPHILS NFR BLD AUTO: 0.8 %
BILIRUB SERPL-MCNC: 0.5 MG/DL (ref 0.1–2)
BUN BLD-MCNC: 21 MG/DL (ref 7–18)
CALCIUM BLD-MCNC: 8.6 MG/DL (ref 8.5–10.1)
CHLORIDE SERPL-SCNC: 107 MMOL/L (ref 98–112)
CO2 SERPL-SCNC: 29 MMOL/L (ref 21–32)
CREAT BLD-MCNC: 0.81 MG/DL
EOSINOPHIL # BLD AUTO: 0 X10(3) UL (ref 0–0.7)
EOSINOPHIL NFR BLD AUTO: 0 %
ERYTHROCYTE [DISTWIDTH] IN BLOOD BY AUTOMATED COUNT: 13.5 %
GLOBULIN PLAS-MCNC: 3.5 G/DL (ref 2.8–4.4)
GLUCOSE BLD-MCNC: 116 MG/DL (ref 70–99)
HCT VFR BLD AUTO: 43.6 %
HGB BLD-MCNC: 13.7 G/DL
IMM GRANULOCYTES # BLD AUTO: 0.25 X10(3) UL (ref 0–1)
IMM GRANULOCYTES NFR BLD: 2.1 %
LYMPHOCYTES # BLD AUTO: 2.07 X10(3) UL (ref 1–4)
LYMPHOCYTES NFR BLD AUTO: 17.4 %
MCH RBC QN AUTO: 29 PG (ref 26–34)
MCHC RBC AUTO-ENTMCNC: 31.4 G/DL (ref 31–37)
MCV RBC AUTO: 92.4 FL
MONOCYTES # BLD AUTO: 1.19 X10(3) UL (ref 0.1–1)
MONOCYTES NFR BLD AUTO: 10 %
NEUTROPHILS # BLD AUTO: 8.28 X10 (3) UL (ref 1.5–7.7)
NEUTROPHILS # BLD AUTO: 8.28 X10(3) UL (ref 1.5–7.7)
NEUTROPHILS NFR BLD AUTO: 69.7 %
OSMOLALITY SERPL CALC.SUM OF ELEC: 294 MOSM/KG (ref 275–295)
PHOSPHATE SERPL-MCNC: 2.8 MG/DL (ref 2.5–4.9)
PLATELET # BLD AUTO: 299 10(3)UL (ref 150–450)
POTASSIUM SERPL-SCNC: 3.8 MMOL/L (ref 3.5–5.1)
PROT SERPL-MCNC: 6.2 G/DL (ref 6.4–8.2)
RBC # BLD AUTO: 4.72 X10(6)UL
SODIUM SERPL-SCNC: 140 MMOL/L (ref 136–145)
WBC # BLD AUTO: 11.9 X10(3) UL (ref 4–11)

## 2022-05-23 PROCEDURE — 99233 SBSQ HOSP IP/OBS HIGH 50: CPT | Performed by: HOSPITALIST

## 2022-05-23 RX ORDER — FUROSEMIDE 10 MG/ML
40 INJECTION INTRAMUSCULAR; INTRAVENOUS ONCE
Status: COMPLETED | OUTPATIENT
Start: 2022-05-23 | End: 2022-05-23

## 2022-05-23 RX ORDER — GUAIFENESIN 600 MG
600 TABLET, EXTENDED RELEASE 12 HR ORAL 2 TIMES DAILY
Qty: 14 TABLET | Refills: 0 | Status: SHIPPED | OUTPATIENT
Start: 2022-05-23 | End: 2022-05-30

## 2022-05-23 RX ORDER — FUROSEMIDE 10 MG/ML
20 INJECTION INTRAMUSCULAR; INTRAVENOUS ONCE
Status: COMPLETED | OUTPATIENT
Start: 2022-05-23 | End: 2022-05-23

## 2022-05-23 RX ORDER — ALBUTEROL SULFATE 2.5 MG/3ML
2.5 SOLUTION RESPIRATORY (INHALATION)
Status: DISCONTINUED | OUTPATIENT
Start: 2022-05-23 | End: 2022-05-24

## 2022-05-23 NOTE — PLAN OF CARE
Problem: Patient/Family Goals  Goal: Patient/Family Short Term Goal  Description: Patient's Short Term Goal: Return to baseline on RA 5/22 nocs: go home  Interventions:   - IS  - Ambulating, OOB  - Wean off oxygen as tolerated  - See additional Care Plan goals for specific interventions  Outcome: Progressing     Problem: RESPIRATORY - ADULT  Goal: Achieves optimal ventilation and oxygenation  Description: INTERVENTIONS:  - Assess for changes in respiratory status  - Assess for changes in mentation and behavior  - Position to facilitate oxygenation and minimize respiratory effort  - Oxygen supplementation based on oxygen saturation or ABGs  - Provide Smoking Cessation handout, if applicable  - Encourage broncho-pulmonary hygiene including cough, deep breathe, Incentive Spirometry  - Assess the need for suctioning and perform as needed  - Assess and instruct to report SOB or any respiratory difficulty  - Respiratory Therapy support as indicated  - Manage/alleviate anxiety  - Monitor for signs/symptoms of CO2 retention  Outcome: Progressing     Problem: GASTROINTESTINAL - ADULT  Goal: Minimal or absence of nausea and vomiting  Description: INTERVENTIONS:  - Maintain adequate hydration with IV or PO as ordered and tolerated  - Nasogastric tube to low intermittent suction as ordered  - Evaluate effectiveness of ordered antiemetic medications  - Provide nonpharmacologic comfort measures as appropriate  - Advance diet as tolerated, if ordered  - Obtain nutritional consult as needed  - Evaluate fluid balance  Outcome: Progressing     Problem: METABOLIC/FLUID AND ELECTROLYTES - ADULT  Goal: Electrolytes maintained within normal limits  Description: INTERVENTIONS:  - Monitor labs and rhythm and assess patient for signs and symptoms of electrolyte imbalances  - Administer electrolyte replacement as ordered  - Monitor response to electrolyte replacements, including rhythm and repeat lab results as appropriate  - Fluid restriction as ordered  - Instruct patient on fluid and nutrition restrictions as appropriate  Outcome: Progressing     Problem: HEMATOLOGIC - ADULT  Goal: Free from bleeding injury  Description: (Example usage: patient with low platelets)  INTERVENTIONS:  - Avoid intramuscular injections, enemas and rectal medication administration  - Ensure safe mobilization of patient  - Hold pressure on venipuncture sites to achieve adequate hemostasis  - Assess for signs and symptoms of internal bleeding  - Monitor lab trends  - Patient is to report abnormal signs of bleeding to staff  - Avoid use of toothpicks and dental floss  - Use electric shaver for shaving  - Use soft bristle tooth brush  - Limit straining and forceful nose blowing  Outcome: Progressing

## 2022-05-23 NOTE — PROGRESS NOTES
Multidisciplinary Discharge Rounds held 5/22/2022. Treatment team members present today include , , Charge Nurse, Nurse, RT, PT and Pharmacy caring for Mike Jhaveri. Other care providers present:    Mobility Goal: ambulating    Readmission Risk:     [] Low     [] Medium     [] High    Active issue(s) requiring resolution prior to discharge: SOB with activity, high blood pressure    Anticipated discharge date: tbd    Current discharge plan: home    F/U appointment scheduled within 7 days. .. [x] [facility] ([facility])     [] Pulmonologist     [] Primary Care Physician     [] Other Specialty    Watched the home discharge recovery videos related to diagnosis. .. [] Pneumonia     [] COPD    [] Home Health set up.    [] Care partner identified and updated with the plan of care. Patient received tonight alert and oriented x4, blood pressure improved after prn hydralazine given, he is on room air with pulse ox in the upper 90's, bilateral wheezing and rhonchi noted, he admits to SOB with activity. Tele sr-st.  Discussed poc, he verbalized understanding. Safety and comfort measures provided, will monitor.

## 2022-05-23 NOTE — PHYSICAL THERAPY NOTE
PHYSICAL THERAPY TREATMENT NOTE - INPATIENT    Room Number: 503/503-A     Session: 1        Presenting Problem: SOB, worsening cough, wheezing  Co-Morbidities : HTN, obesity      History related to current admission: Patient is a 60year old male admitted on 5/18/2022 from home for SOB, worsening cough, wheezing. Pt diagnosed with acute hypoxic respiratory failure due to influenza A, possible super imposed bacterial pneumonia and acute pulmonary edema. ASSESSMENT     Initiated stair training with pt this session. Pt able to ascend/descend 1 flight of stairs with one hand rail and supervision using step to method. Pt has labored breathing and requires breaks but O2 sat >90% on RA. Pt is at supervision level for amb without assistive device for O2 monitoring and symptom monitoring. Pt gait appears steady and no LOB noted. Pt continues to present with decrease endurance and decrease functional mobility skills. Pt may benefit from continued PT to address deficits and facilitate return to PLOF. DISCHARGE RECOMMENDATIONS  PT Discharge Recommendations: Home     PLAN  PT Treatment Plan: Bed mobility; Patient education;Gait training;Range of motion;Strengthening;Stair training;Transfer training  Rehab Potential : Good  Frequency (Obs): 3-5x/week    CURRENT GOALS        Goal #1 Patient is able to demonstrate supine - sit EOB @ level: independent-MET      Goal #2 Patient is able to demonstrate transfers Sit to/from Stand at assistance level: independent      Goal #3 Patient is able to ambulate 200 feet with assist device: none at assistance level: independent maintaining O2sat in the upper to mid 90's      Goal #4 Patient to be independent stair negotiation total of 14 stairs step-to pattern-MET 5/23/22   Goal #5     Goal #6     Goal Comments: Goals established on 5/21/2022 5/23/2022 goals 2 and 3 ongoing. SUBJECTIVE  \"I have no problem walking. \"    OBJECTIVE  Precautions: None    WEIGHT BEARING RESTRICTION  Weight Bearing Restriction: None                PAIN ASSESSMENT   Ratin          BALANCE                                                                                                                       Static Sitting: Good  Dynamic Sitting: Good           Static Standing: Good  Dynamic Standing: Good    ACTIVITY TOLERANCE                         O2 WALK  Oxygen Therapy  SPO2% on Room Air at Rest: 97  SPO2% Ambulation on Room Air: 90      AM-PAC '6-Clicks' INPATIENT SHORT FORM - BASIC MOBILITY  How much difficulty does the patient currently have. .. Patient Difficulty: Turning over in bed (including adjusting bedclothes, sheets and blankets)?: None   Patient Difficulty: Sitting down on and standing up from a chair with arms (e.g., wheelchair, bedside commode, etc.): None   Patient Difficulty: Moving from lying on back to sitting on the side of the bed?: None   How much help from another person does the patient currently need. ..    Help from Another: Moving to and from a bed to a chair (including a wheelchair)?: None   Help from Another: Need to walk in hospital room?: A Little   Help from Another: Climbing 3-5 steps with a railing?: A Little       AM-PAC Score:  Raw Score: 22   Approx Degree of Impairment: 20.91%   Standardized Score (AM-PAC Scale): 53.28   CMS Modifier (G-Code): CJ    FUNCTIONAL ABILITY STATUS  Gait Assessment   Functional Mobility/Gait Assessment  Gait Assistance: Supervision  Distance (ft): 150  Assistive Device: None  Pattern: Within Functional Limits  Stairs: Stairs  How Many Stairs: 1 flight  Device: 1 Rail  Assist: Supervision  Pattern: Ascend and Descend  Ascend and Descend : Step to    Skilled Therapy Provided    Bed Mobility:  Rolling right: ind   Rolling left: ind    Supine<>Sit: ind   Sit<>Supine: ind     Transfer Mobility:  Sit<>Stand: modified ind   Stand<>Sit: modified ind   Gait: pt amb x 150 feet without assistive device with supervision to monitor O2 sat and symptoms. Therapist's Comments: per RN pt ok to be seen. Pt received in bed and agreeable to therapy. Pt on room air. Pt has no complaints of pain or dizziness. Pt is modified in with bed mobility and sit to stand transfers. Pt amb x 150 feet without assistive device, no LOB, steady gait, and with supervision to monitor O2 sats which remained >90% throughout session. Pt was able to ascend/descend 1 flight of stairs with 1 handrail with supervision to monitor O2 sats and symptoms. Pt required brief standing rest break after ascending stairs. Pt returned to room and sitting at EOB. O2 at 100% initially on RA and decrease to 93% but increase once pt returned to supine. Pt left in bed with call light and needs within reach, educated on activity recommendations and taking breaks as needed, and questions answered. RN was updated with above. Patient End of Session: In bed;Needs met;Call light within reach;RN aware of session/findings; All patient questions and concerns addressed

## 2022-05-24 VITALS
OXYGEN SATURATION: 92 % | WEIGHT: 220 LBS | HEART RATE: 102 BPM | BODY MASS INDEX: 35.36 KG/M2 | RESPIRATION RATE: 18 BRPM | HEIGHT: 66 IN | TEMPERATURE: 98 F | DIASTOLIC BLOOD PRESSURE: 92 MMHG | SYSTOLIC BLOOD PRESSURE: 151 MMHG

## 2022-05-24 PROCEDURE — 99239 HOSP IP/OBS DSCHRG MGMT >30: CPT | Performed by: HOSPITALIST

## 2022-05-24 RX ORDER — FUROSEMIDE 10 MG/ML
40 INJECTION INTRAMUSCULAR; INTRAVENOUS ONCE
Status: COMPLETED | OUTPATIENT
Start: 2022-05-24 | End: 2022-05-24

## 2022-05-24 RX ORDER — PREGABALIN 75 MG/1
150 CAPSULE ORAL 2 TIMES DAILY
Status: SHIPPED | COMMUNITY
Start: 2022-05-24

## 2022-05-24 RX ORDER — CEFDINIR 300 MG/1
300 CAPSULE ORAL 2 TIMES DAILY
Qty: 4 CAPSULE | Refills: 0 | Status: SHIPPED | OUTPATIENT
Start: 2022-05-24 | End: 2022-05-26

## 2022-05-24 RX ORDER — FUROSEMIDE 20 MG/1
20 TABLET ORAL DAILY
Qty: 3 TABLET | Refills: 0 | Status: SHIPPED | OUTPATIENT
Start: 2022-05-24 | End: 2022-05-27

## 2022-05-24 RX ORDER — PREDNISONE 10 MG/1
TABLET ORAL
Qty: 20 TABLET | Refills: 0 | Status: SHIPPED | OUTPATIENT
Start: 2022-05-24

## 2022-05-24 NOTE — DISCHARGE PLANNING
NURSING DISCHARGE NOTE    Discharged Home via Wheelchair. Accompanied by Support staff  Belongings Taken by patient/family. PIV removed. Tele box removed & placed in drawer  Discharge navigator complete  Discharge instructions reviewed with patient at bedside. O2 education reviewed. All questions & concerns addressed at this time.

## 2022-05-24 NOTE — CM/SW NOTE
Order received for Home O2- referral sent to [address] [place] [address] in [facility]. Await acceptance.     Yadira Eason LCSW  /Discharge Planner  [phone]

## 2022-05-24 NOTE — PLAN OF CARE
Problem: PNA , Influenza A     Data: Pt A&Ox4. Pt maintaing O2 saturations while awake. Pt desaturated to 84% SPO2 , 3L via NC while pt sleeping. Telemetry monitored. PRN hydralazine. Voids. Up independently . Pt updated on POC and verbalized understanding. Frequently rounding. Action: Keep patient comfortable and monitor every two hours. Education: Plan of care for the day. Response: Verbalized understanding.      Problem: Patient/Family Goals  Goal: Patient/Family Long Term Goal  Description: Patient's Long Term Goal: To go home    Interventions:  - PT/OT  - Off oxygen  - Decrease respiratory symptoms/SOB  - See additional Care Plan goals for specific interventions  Outcome: Progressing  Goal: Patient/Family Short Term Goal  Description: Patient's Short Term Goal: Return to baseline on RA  5/22 nocs: go home  5/23 NOC:  go home   Interventions:   - IS  - Ambulating, OOB  - Wean off oxygen as tolerated  - See additional Care Plan goals for specific interventions  Outcome: Progressing     Problem: RESPIRATORY - ADULT  Goal: Achieves optimal ventilation and oxygenation  Description: INTERVENTIONS:  - Assess for changes in respiratory status  - Assess for changes in mentation and behavior  - Position to facilitate oxygenation and minimize respiratory effort  - Oxygen supplementation based on oxygen saturation or ABGs  - Provide Smoking Cessation handout, if applicable  - Encourage broncho-pulmonary hygiene including cough, deep breathe, Incentive Spirometry  - Assess the need for suctioning and perform as needed  - Assess and instruct to report SOB or any respiratory difficulty  - Respiratory Therapy support as indicated  - Manage/alleviate anxiety  - Monitor for signs/symptoms of CO2 retention  Outcome: Progressing     Problem: GASTROINTESTINAL - ADULT  Goal: Minimal or absence of nausea and vomiting  Description: INTERVENTIONS:  - Maintain adequate hydration with IV or PO as ordered and tolerated  - Nasogastric tube to low intermittent suction as ordered  - Evaluate effectiveness of ordered antiemetic medications  - Provide nonpharmacologic comfort measures as appropriate  - Advance diet as tolerated, if ordered  - Obtain nutritional consult as needed  - Evaluate fluid balance  Outcome: Progressing     Problem: METABOLIC/FLUID AND ELECTROLYTES - ADULT  Goal: Electrolytes maintained within normal limits  Description: INTERVENTIONS:  - Monitor labs and rhythm and assess patient for signs and symptoms of electrolyte imbalances  - Administer electrolyte replacement as ordered  - Monitor response to electrolyte replacements, including rhythm and repeat lab results as appropriate  - Fluid restriction as ordered  - Instruct patient on fluid and nutrition restrictions as appropriate  Outcome: Progressing     Problem: HEMATOLOGIC - ADULT  Goal: Free from bleeding injury  Description: (Example usage: patient with low platelets)  INTERVENTIONS:  - Avoid intramuscular injections, enemas and rectal medication administration  - Ensure safe mobilization of patient  - Hold pressure on venipuncture sites to achieve adequate hemostasis  - Assess for signs and symptoms of internal bleeding  - Monitor lab trends  - Patient is to report abnormal signs of bleeding to staff  - Avoid use of toothpicks and dental floss  - Use electric shaver for shaving  - Use soft bristle tooth brush  - Limit straining and forceful nose blowing  Outcome: Progressing

## 2022-05-24 NOTE — CM/SW NOTE
05/24/22 1300   Discharge disposition   Expected discharge disposition Home or Self   DME/Infusion Providers [facility] accepted pt for home O2 . RT to provide portable. Pt updated and aware of need to call [employer] to arrange for delivery of O2 concentrator.     Uziel Lyons LCSW  /Discharge Planner  [phone]

## 2022-05-24 NOTE — PROGRESS NOTES
SPO2 on Room Air at Rest: 90  SPO2 Ambulation on Room Air: 86  SPO2 Ambulation on Oxygen: 90  Ambulation oxygen flow (liters per minute): 2L

## 2022-05-25 ENCOUNTER — PATIENT OUTREACH (OUTPATIENT)
Dept: CASE MANAGEMENT | Age: 62
End: 2022-05-25

## 2022-05-25 DIAGNOSIS — Z02.9 ENCOUNTERS FOR UNSPECIFIED ADMINISTRATIVE PURPOSE: ICD-10-CM

## 2022-05-25 NOTE — PAYOR COMM NOTE
Discharge Notification    Patient Name: Yennifer Robert  Payor: [facility] #: [id number]  Authorization Number: [id number]  Admit Date/Time: 5/18/2022 8:01 AM  Discharge Date/Time: 5/24/2022 4:48 PM

## 2022-06-02 ENCOUNTER — OFFICE VISIT (OUTPATIENT)
Dept: FAMILY MEDICINE CLINIC | Facility: CLINIC | Age: 62
End: 2022-06-02
Payer: COMMERCIAL

## 2022-06-02 VITALS
TEMPERATURE: 98 F | HEART RATE: 68 BPM | BODY MASS INDEX: 35 KG/M2 | DIASTOLIC BLOOD PRESSURE: 70 MMHG | WEIGHT: 218.63 LBS | SYSTOLIC BLOOD PRESSURE: 100 MMHG | OXYGEN SATURATION: 95 %

## 2022-06-02 DIAGNOSIS — Z87.09 HISTORY OF INFLUENZA: ICD-10-CM

## 2022-06-02 DIAGNOSIS — Z87.09 HISTORY OF RESPIRATORY FAILURE: Primary | ICD-10-CM

## 2022-06-02 DIAGNOSIS — G57.93 NEUROPATHY INVOLVING BOTH LOWER EXTREMITIES: ICD-10-CM

## 2022-06-02 DIAGNOSIS — I10 HYPERTENSION, ESSENTIAL: ICD-10-CM

## 2022-06-02 DIAGNOSIS — D72.828 OTHER ELEVATED WHITE BLOOD CELL (WBC) COUNT: ICD-10-CM

## 2022-06-02 DIAGNOSIS — I95.89 IATROGENIC HYPOTENSION: ICD-10-CM

## 2022-06-02 DIAGNOSIS — R09.02 HYPOXIA: ICD-10-CM

## 2022-06-02 DIAGNOSIS — R74.8 ELEVATED LIVER ENZYMES: ICD-10-CM

## 2022-06-02 DIAGNOSIS — Z23 NEED FOR VACCINATION: ICD-10-CM

## 2022-06-02 PROBLEM — R06.00 DYSPNEA ON EXERTION: Status: RESOLVED | Noted: 2021-10-25 | Resolved: 2022-06-02

## 2022-06-02 PROBLEM — Z12.11 SCREENING FOR COLON CANCER: Status: RESOLVED | Noted: 2021-08-24 | Resolved: 2022-06-02

## 2022-06-02 PROBLEM — R06.09 DYSPNEA ON EXERTION: Status: RESOLVED | Noted: 2021-10-25 | Resolved: 2022-06-02

## 2022-06-02 PROBLEM — R50.9 FEVER, UNSPECIFIED FEVER CAUSE: Status: RESOLVED | Noted: 2022-05-18 | Resolved: 2022-06-02

## 2022-06-02 PROCEDURE — 3074F SYST BP LT 130 MM HG: CPT | Performed by: FAMILY MEDICINE

## 2022-06-02 PROCEDURE — 3078F DIAST BP <80 MM HG: CPT | Performed by: FAMILY MEDICINE

## 2022-06-02 PROCEDURE — 99495 TRANSJ CARE MGMT MOD F2F 14D: CPT | Performed by: FAMILY MEDICINE

## 2022-06-02 PROCEDURE — 90471 IMMUNIZATION ADMIN: CPT | Performed by: FAMILY MEDICINE

## 2022-06-02 PROCEDURE — 90732 PPSV23 VACC 2 YRS+ SUBQ/IM: CPT | Performed by: FAMILY MEDICINE

## 2022-06-09 ENCOUNTER — MED REC SCAN ONLY (OUTPATIENT)
Dept: FAMILY MEDICINE CLINIC | Facility: CLINIC | Age: 62
End: 2022-06-09

## 2022-06-13 ENCOUNTER — PATIENT MESSAGE (OUTPATIENT)
Dept: FAMILY MEDICINE CLINIC | Facility: CLINIC | Age: 62
End: 2022-06-13

## 2022-06-13 DIAGNOSIS — I50.32 CHRONIC DIASTOLIC HEART FAILURE (HCC): Primary | ICD-10-CM

## 2022-06-14 NOTE — TELEPHONE ENCOUNTER
_______________________________________________________  Referred to Provider Information:  Provider Address Phone   Donaldo Trinidad MD [address]  Referral placed to cardiologist.    Heart rate of  is not pathologic. Patient does not exercise regularly so expect resting heart rate to be in the 80s. If he becomes physically active on a regular basis his heart rate may decrease. I recommend holding the atenolol until he is evaluated with the cardiologist.    Please inform and asked patient to provide blood pressure ranges at home.

## 2022-06-14 NOTE — TELEPHONE ENCOUNTER
From: Lynne Rene  To: Dyanasoila Walton DO  Sent: 6/13/2022 6:40 PM CDT  Subject: cardiologist request    I saw Dr. Paulina Jama the pulmonologist. This was for a post hospital follow up. He basically said I have [address] and should have my primary Dr get me a referral to a cardiologist.I am also doing an overnight pulse oxymeter test tonight. Dr. Paulina Jama thinks I might have sleep apnea. I don't think I do but he won't send in a return order on my oxygen tanks until he gets results on this oxymeter test. I haven't used O2 in 2 weeks or more. I have stopped the Atenolol as you told me to. My blood pressure is checked around 10 times a day. Its ranges are good. However my heart rate is very high again.  at times. It was much better on the Atenolol. I am hesitant to go back to the Atenolol and would rather wait to see a cardiologist to resolve this ASAP.  I look forward to your reply, Thanks, Marlen Mackay

## 2022-06-17 ENCOUNTER — PATIENT MESSAGE (OUTPATIENT)
Dept: FAMILY MEDICINE CLINIC | Facility: CLINIC | Age: 62
End: 2022-06-17

## 2022-06-20 NOTE — TELEPHONE ENCOUNTER
From: Jyoti Hicks  To: Maria Elena Sinclair DO  Sent: 6/17/2022 4:31 PM CDT  Subject: Covid booster     Please update my chart that I have received my first covid booster on June 17th 22.

## 2022-06-22 ENCOUNTER — TELEMEDICINE (OUTPATIENT)
Dept: FAMILY MEDICINE CLINIC | Facility: CLINIC | Age: 62
End: 2022-06-22
Payer: COMMERCIAL

## 2022-06-22 ENCOUNTER — HOSPITAL ENCOUNTER (INPATIENT)
Facility: HOSPITAL | Age: 62
LOS: 2 days | Discharge: HOME | End: 2022-06-24
Attending: EMERGENCY MEDICINE | Admitting: HOSPITALIST
Payer: COMMERCIAL

## 2022-06-22 ENCOUNTER — APPOINTMENT (OUTPATIENT)
Dept: GENERAL RADIOLOGY | Facility: HOSPITAL | Age: 62
End: 2022-06-22
Attending: EMERGENCY MEDICINE
Payer: COMMERCIAL

## 2022-06-22 ENCOUNTER — APPOINTMENT (OUTPATIENT)
Dept: CT IMAGING | Facility: HOSPITAL | Age: 62
End: 2022-06-22
Attending: EMERGENCY MEDICINE
Payer: COMMERCIAL

## 2022-06-22 DIAGNOSIS — Z87.01 HISTORY OF INFLUENZA PNEUMONIA: ICD-10-CM

## 2022-06-22 DIAGNOSIS — U07.1 COVID-19: Primary | ICD-10-CM

## 2022-06-22 DIAGNOSIS — Z87.09 HISTORY OF RESPIRATORY FAILURE: ICD-10-CM

## 2022-06-22 DIAGNOSIS — I10 ESSENTIAL HYPERTENSION: ICD-10-CM

## 2022-06-22 DIAGNOSIS — I26.94 MULTIPLE SUBSEGMENTAL PULMONARY EMBOLI WITHOUT ACUTE COR PULMONALE (HCC): ICD-10-CM

## 2022-06-22 DIAGNOSIS — R53.83 LETHARGY: ICD-10-CM

## 2022-06-22 LAB
ALBUMIN SERPL-MCNC: 3.3 G/DL (ref 3.4–5)
ALBUMIN/GLOB SERPL: 0.8 {RATIO} (ref 1–2)
ALP LIVER SERPL-CCNC: 78 U/L
ALT SERPL-CCNC: 18 U/L
ANION GAP SERPL CALC-SCNC: 7 MMOL/L (ref 0–18)
APTT PPP: >240 SECONDS (ref 23.3–35.6)
AST SERPL-CCNC: 18 U/L (ref 15–37)
BASOPHILS # BLD AUTO: 0.11 X10(3) UL (ref 0–0.2)
BASOPHILS NFR BLD AUTO: 1.2 %
BILIRUB SERPL-MCNC: 0.5 MG/DL (ref 0.1–2)
BILIRUB UR QL STRIP.AUTO: NEGATIVE
BUN BLD-MCNC: 11 MG/DL (ref 7–18)
CALCIUM BLD-MCNC: 8.9 MG/DL (ref 8.5–10.1)
CHLORIDE SERPL-SCNC: 102 MMOL/L (ref 98–112)
CK SERPL-CCNC: 84 U/L
CLARITY UR REFRACT.AUTO: CLEAR
CO2 SERPL-SCNC: 26 MMOL/L (ref 21–32)
COLOR UR AUTO: YELLOW
CREAT BLD-MCNC: 0.81 MG/DL
CRP SERPL-MCNC: 8.64 MG/DL (ref ?–0.3)
D DIMER PPP FEU-MCNC: 1.9 UG/ML FEU (ref ?–0.63)
DEPRECATED HBV CORE AB SER IA-ACNC: 344.5 NG/ML
EOSINOPHIL # BLD AUTO: 0.2 X10(3) UL (ref 0–0.7)
EOSINOPHIL NFR BLD AUTO: 2.1 %
ERYTHROCYTE [DISTWIDTH] IN BLOOD BY AUTOMATED COUNT: 13.4 %
GLOBULIN PLAS-MCNC: 4.4 G/DL (ref 2.8–4.4)
GLUCOSE BLD-MCNC: 107 MG/DL (ref 70–99)
GLUCOSE UR STRIP.AUTO-MCNC: NEGATIVE MG/DL
HCT VFR BLD AUTO: 46.2 %
HGB BLD-MCNC: 14.4 G/DL
HYALINE CASTS #/AREA URNS AUTO: PRESENT /LPF
IMM GRANULOCYTES # BLD AUTO: 0.04 X10(3) UL (ref 0–1)
IMM GRANULOCYTES NFR BLD: 0.4 %
KETONES UR STRIP.AUTO-MCNC: 80 MG/DL
LDH SERPL L TO P-CCNC: 258 U/L
LEUKOCYTE ESTERASE UR QL STRIP.AUTO: NEGATIVE
LYMPHOCYTES # BLD AUTO: 1.43 X10(3) UL (ref 1–4)
LYMPHOCYTES NFR BLD AUTO: 15.3 %
MCH RBC QN AUTO: 28.7 PG (ref 26–34)
MCHC RBC AUTO-ENTMCNC: 31.2 G/DL (ref 31–37)
MCV RBC AUTO: 92.2 FL
MONOCYTES # BLD AUTO: 0.81 X10(3) UL (ref 0.1–1)
MONOCYTES NFR BLD AUTO: 8.7 %
NEUTROPHILS # BLD AUTO: 6.77 X10 (3) UL (ref 1.5–7.7)
NEUTROPHILS # BLD AUTO: 6.77 X10(3) UL (ref 1.5–7.7)
NEUTROPHILS NFR BLD AUTO: 72.3 %
NITRITE UR QL STRIP.AUTO: NEGATIVE
NT-PROBNP SERPL-MCNC: 237 PG/ML (ref ?–125)
OSMOLALITY SERPL CALC.SUM OF ELEC: 280 MOSM/KG (ref 275–295)
PH UR STRIP.AUTO: 6.5 [PH] (ref 5–8)
PLATELET # BLD AUTO: 418 10(3)UL (ref 150–450)
POTASSIUM SERPL-SCNC: 3.6 MMOL/L (ref 3.5–5.1)
PROCALCITONIN SERPL-MCNC: <0.05 NG/ML (ref ?–0.16)
PROT SERPL-MCNC: 7.7 G/DL (ref 6.4–8.2)
RBC # BLD AUTO: 5.01 X10(6)UL
SARS-COV-2 RNA RESP QL NAA+PROBE: DETECTED
SODIUM SERPL-SCNC: 135 MMOL/L (ref 136–145)
SP GR UR STRIP.AUTO: >=1.03 (ref 1–1.03)
TROPONIN I HIGH SENSITIVITY: 6 NG/L
TROPONIN I HIGH SENSITIVITY: 8 NG/L
UROBILINOGEN UR STRIP.AUTO-MCNC: 0.2 MG/DL
WBC # BLD AUTO: 9.4 X10(3) UL (ref 4–11)

## 2022-06-22 PROCEDURE — XW033E5 INTRODUCTION OF REMDESIVIR ANTI-INFECTIVE INTO PERIPHERAL VEIN, PERCUTANEOUS APPROACH, NEW TECHNOLOGY GROUP 5: ICD-10-PCS | Performed by: HOSPITALIST

## 2022-06-22 PROCEDURE — 3E0333Z INTRODUCTION OF ANTI-INFLAMMATORY INTO PERIPHERAL VEIN, PERCUTANEOUS APPROACH: ICD-10-PCS | Performed by: HOSPITALIST

## 2022-06-22 PROCEDURE — 99214 OFFICE O/P EST MOD 30 MIN: CPT | Performed by: FAMILY MEDICINE

## 2022-06-22 PROCEDURE — 71045 X-RAY EXAM CHEST 1 VIEW: CPT | Performed by: EMERGENCY MEDICINE

## 2022-06-22 PROCEDURE — 99223 1ST HOSP IP/OBS HIGH 75: CPT | Performed by: INTERNAL MEDICINE

## 2022-06-22 PROCEDURE — 71275 CT ANGIOGRAPHY CHEST: CPT | Performed by: EMERGENCY MEDICINE

## 2022-06-22 RX ORDER — FLUTICASONE FUROATE AND VILANTEROL 200; 25 UG/1; UG/1
1 POWDER RESPIRATORY (INHALATION) DAILY
Status: DISCONTINUED | OUTPATIENT
Start: 2022-06-22 | End: 2022-06-24

## 2022-06-22 RX ORDER — HEPARIN SODIUM 5000 [USP'U]/ML
5000 INJECTION, SOLUTION INTRAVENOUS; SUBCUTANEOUS EVERY 8 HOURS SCHEDULED
Status: DISCONTINUED | OUTPATIENT
Start: 2022-06-22 | End: 2022-06-22

## 2022-06-22 RX ORDER — BENZONATATE 200 MG/1
200 CAPSULE ORAL 3 TIMES DAILY PRN
Status: DISCONTINUED | OUTPATIENT
Start: 2022-06-22 | End: 2022-06-24

## 2022-06-22 RX ORDER — SENNOSIDES 8.6 MG
17.2 TABLET ORAL NIGHTLY PRN
Status: DISCONTINUED | OUTPATIENT
Start: 2022-06-22 | End: 2022-06-24

## 2022-06-22 RX ORDER — HEPARIN SODIUM AND DEXTROSE 10000; 5 [USP'U]/100ML; G/100ML
INJECTION INTRAVENOUS CONTINUOUS
Status: DISCONTINUED | OUTPATIENT
Start: 2022-06-22 | End: 2022-06-24

## 2022-06-22 RX ORDER — SODIUM PHOSPHATE, DIBASIC AND SODIUM PHOSPHATE, MONOBASIC 7; 19 G/133ML; G/133ML
1 ENEMA RECTAL ONCE AS NEEDED
Status: DISCONTINUED | OUTPATIENT
Start: 2022-06-22 | End: 2022-06-24

## 2022-06-22 RX ORDER — PROCHLORPERAZINE EDISYLATE 5 MG/ML
5 INJECTION INTRAMUSCULAR; INTRAVENOUS EVERY 8 HOURS PRN
Status: DISCONTINUED | OUTPATIENT
Start: 2022-06-22 | End: 2022-06-24

## 2022-06-22 RX ORDER — ACETAMINOPHEN 500 MG
1000 TABLET ORAL EVERY 8 HOURS PRN
Status: DISCONTINUED | OUTPATIENT
Start: 2022-06-22 | End: 2022-06-24

## 2022-06-22 RX ORDER — ONDANSETRON 2 MG/ML
4 INJECTION INTRAMUSCULAR; INTRAVENOUS EVERY 6 HOURS PRN
Status: DISCONTINUED | OUTPATIENT
Start: 2022-06-22 | End: 2022-06-24

## 2022-06-22 RX ORDER — ATENOLOL 25 MG/1
25 TABLET ORAL
Status: DISCONTINUED | OUTPATIENT
Start: 2022-06-23 | End: 2022-06-22

## 2022-06-22 RX ORDER — MELATONIN
3 NIGHTLY PRN
Status: DISCONTINUED | OUTPATIENT
Start: 2022-06-22 | End: 2022-06-24

## 2022-06-22 RX ORDER — ACETAMINOPHEN 500 MG
1000 TABLET ORAL ONCE
Status: COMPLETED | OUTPATIENT
Start: 2022-06-22 | End: 2022-06-22

## 2022-06-22 RX ORDER — HEPARIN SODIUM 1000 [USP'U]/ML
80 INJECTION, SOLUTION INTRAVENOUS; SUBCUTANEOUS ONCE
Status: COMPLETED | OUTPATIENT
Start: 2022-06-22 | End: 2022-06-22

## 2022-06-22 RX ORDER — DEXAMETHASONE SODIUM PHOSPHATE 10 MG/ML
6 INJECTION, SOLUTION INTRAMUSCULAR; INTRAVENOUS ONCE
Status: COMPLETED | OUTPATIENT
Start: 2022-06-22 | End: 2022-06-22

## 2022-06-22 RX ORDER — POTASSIUM CHLORIDE 20 MEQ/1
40 TABLET, EXTENDED RELEASE ORAL EVERY 4 HOURS
Status: COMPLETED | OUTPATIENT
Start: 2022-06-22 | End: 2022-06-22

## 2022-06-22 RX ORDER — GUAIFENESIN 600 MG
600 TABLET, EXTENDED RELEASE 12 HR ORAL 2 TIMES DAILY
Status: DISCONTINUED | OUTPATIENT
Start: 2022-06-22 | End: 2022-06-24

## 2022-06-22 RX ORDER — ALBUTEROL SULFATE 90 UG/1
8 AEROSOL, METERED RESPIRATORY (INHALATION) 4 TIMES DAILY
Status: DISCONTINUED | OUTPATIENT
Start: 2022-06-22 | End: 2022-06-24

## 2022-06-22 RX ORDER — POLYETHYLENE GLYCOL 3350 17 G/17G
17 POWDER, FOR SOLUTION ORAL DAILY PRN
Status: DISCONTINUED | OUTPATIENT
Start: 2022-06-22 | End: 2022-06-24

## 2022-06-22 RX ORDER — DEXAMETHASONE SODIUM PHOSPHATE 4 MG/ML
6 VIAL (ML) INJECTION DAILY
Status: DISCONTINUED | OUTPATIENT
Start: 2022-06-23 | End: 2022-06-23

## 2022-06-22 RX ORDER — FUROSEMIDE 10 MG/ML
20 INJECTION INTRAMUSCULAR; INTRAVENOUS ONCE
Status: COMPLETED | OUTPATIENT
Start: 2022-06-22 | End: 2022-06-22

## 2022-06-22 RX ORDER — ALBUTEROL SULFATE 90 UG/1
4 AEROSOL, METERED RESPIRATORY (INHALATION) EVERY 4 HOURS PRN
Status: DISCONTINUED | OUTPATIENT
Start: 2022-06-22 | End: 2022-06-24

## 2022-06-22 RX ORDER — LABETALOL HYDROCHLORIDE 5 MG/ML
20 INJECTION, SOLUTION INTRAVENOUS ONCE
Status: COMPLETED | OUTPATIENT
Start: 2022-06-22 | End: 2022-06-22

## 2022-06-22 RX ORDER — BISACODYL 10 MG
10 SUPPOSITORY, RECTAL RECTAL
Status: DISCONTINUED | OUTPATIENT
Start: 2022-06-22 | End: 2022-06-24

## 2022-06-22 RX ORDER — ATENOLOL 25 MG/1
25 TABLET ORAL NIGHTLY
Status: DISCONTINUED | OUTPATIENT
Start: 2022-06-22 | End: 2022-06-24

## 2022-06-22 RX ORDER — HEPARIN SODIUM AND DEXTROSE 10000; 5 [USP'U]/100ML; G/100ML
18 INJECTION INTRAVENOUS ONCE
Status: COMPLETED | OUTPATIENT
Start: 2022-06-22 | End: 2022-06-22

## 2022-06-22 NOTE — ED INITIAL ASSESSMENT (HPI)
States tested positive for Covid yesterday, as did his wife. Began having difficulty breathing then. Can hear obvious fluid with respirations, difficulty speaking in complete sentences.

## 2022-06-22 NOTE — PROGRESS NOTES
Due to COVID-19 ACTION PLAN, the patient's office visit was converted to a video visit. Time Spent:25 mins +8 minutes writing note. Chief complaint: COVID-19 positive    Subjective     HPI:   Janet Driver is a 60year old male who presents for COVID-19 follow-up. Tested positive on 6/20/2022 at [facility].  Symptoms started on Monday with cough and shortness of breath, dry mouth, headaches but no measured fever T-max was 99.2. Patient's pulse ox has ranged between 90 to 92%. Has history of recently being admitted for respiratory failure on home oxygen with influenza at [facility] about 4 weeks ago. Patient admits to feeling short of breath having wheezing and feels extremely fatigued. Appetites decreased. Denies chest pain, abdominal pain, dizziness. Admits to urinating every couple of hours. Has difficulty focusing and trying to watch a movie. Wants to sleep all the time. Patient recently restarted his home O2 after his pulse ox is at 89 to 90%. He was wearing his O2 at the start of her visit which was removed. At the end of the visit he will still 91 to 92% on room air. He did get up and walk around as well. He is vaccinated for COVID-19 received recent booster 5 days ago. No Further Nursing Notes to Review  Tobacco Reviewed  Allergies   Reviewed  Medications Reviewed  Problem List Reviewed  Medical History   Reviewed  Surgical History Reviewed  Family History Reviewed                pregabalin 75 MG Oral Cap, Take 2 capsules (150 mg total) by mouth 2 (two) times daily. May increase to 75 mg am and 150 mg pm in 1 week, then may increase to 150 mg bid, Disp: , Rfl:   fluticasone furoate-vilanterol 200-25 MCG/INH Inhalation Aerosol Powder, Breath Activated, Inhale 1 puff into the lungs daily. , Disp: 60 each, Rfl: 1  predniSONE 10 MG Oral Tab, take 3 tablets daily for 3 days then 2 tablets daily for 3 days then 1 tablets daily for 3 days then stop.  (Patient not taking: Reported on 6/2/2022), Disp: 20 tablet, Rfl: 0    No current facility-administered medications on file prior to visit. Physical Exam:  There were no vitals taken for this visit. Lethargic-slow to respond-lying in bed he has difficulty keeping his eyes open.,no pallor or tachypnea, speaking in full sentences comfortably and Normal work of breathing, answers questions appropriately but very slowly. Assessment    Diagnoses and all orders for this visit:    COVID-19  Lethargy  History of influenza pneumonia  History of respiratory failure   Patient's high risk and qualifies for monoclonal antibody. Elevated BMI, hypertension, age, recent respiratory failure due to influenza pneumonia however given his lethargy and borderline hypoxia recommend going to [facility] to be assessed and receive monoclonal antibody if does not qualify for admission. If returns home after ER eval-   Patient to remain in isolation until meets CDC guidelines for discontinuing isolation. Increase fluids and rest  monitor temperature twice daily prior to taking Tylenol or  if feel febrile  Use Tylenol/acetaminophen only for analgesic. Max doses 4000 mg in 24 hours due to risk of liver injury. Avoid NSAIDS -ie aleve and ibuprofen and advil  May also use Robitussin-DM for cough or Mucinex DM-or plain Mucinex generic. If develops severe headache, chest pain, shortness of breath, confusion or severe symptoms, must call office immediately immediately or go to nearest ER/911. Maintain social distancing and isolation away from family members for a minimum of 5 days from onset of symptoms and no fever /analgesic use and minimal or no cough without cough suppressants and no other covid symptoms for at least 24 hours.   If minimal to no symptoms, afebrile for 24 hours and minimal to no cough, may wear mask around others for 5 days    Katia Yeboah  advised to follow CDC guidelines for self isolation and symptomatic treatment as outlined on CDC Patient Guidelines. Return for Sent to ER for evaluation possible monoclonal antibody versus admission. Sarah Seen, DO Boyd Caro understands video or phone evaluation is not a substitute for face-to-face examination or emergency care. Patient advised to go to ER or call 911 for worsening symptoms or acute distress. Patient/Caregiver Education: Patient/Caregiver Education: There are no barriers to learning. Medical education done. Outcome: Patient verbalizes understanding. Patient is notified to call with any questions, complications, allergies, or worsening or changing symptoms. Patient is to call with any side effects or complications from the treatments as a result of today. Please note that the following visit was completed using two-way, real-time interactive audio and/or video communication. This has been done in good nelly to provide continuity of care in the best interest of the provider-patient relationship, due to the on-going public health crisis/national emergency and because of restrictions of visitation. There are limitations of this visit as no physical exam could be performed. Every conscious effort was taken to allow for sufficient and adequate time. This billing visit was spent on reviewing labs, medications, radiology tests and decision making. Appropriate medical decision-making and tests are ordered as detailed in the plan of care above.

## 2022-06-22 NOTE — PROGRESS NOTES
NURSING ADMISSION NOTE      Patient admitted via Cart  Oriented to room. Safety precautions initiated. Bed in low position. Call light in reach. Admission navigator completed. Pt is A/O x4,maintaining O2 sat WNL on 2L NC, will wean as tolerated. Room air is baseline. Encouraged IS use at bedside. ST on tele, elevated BP, MD aware- no new orders. Heparin gtt infusing at 18 ml/hr, redraw aptt at 11 pm tonight. Potassium replaced per protocol. BM today. Voids per urinal. Denies pain. Up with standby assist, sitting in chair for dinner. Tolerating diet, poor appetite. Pt updated on plan of care, verbalized understanding. No needs at this time. 1800- This RN called ER nurse regarding aptt that was not drawn before the heparin gtt was started on the patient. ER nurse stated that they did not draw the blood prior to starting the drip. This RN called lab to draw aptt on patient when he arrived to room [facility] called this RN regarding the aptt being greater than 240.0. This was drawn after the heparin gtt and bolus started on patient. Redraw of aptt is due at 11 pm tonight, will endorse to oncoming RN.

## 2022-06-22 NOTE — ED PROVIDER NOTES
CT ANGIOGRAPHY, CHEST (CPT=71275)    Result Date: 6/22/2022  PROCEDURE:  CT ANGIOGRAPHY, CHEST (CPT=71275)  COMPARISON:  [facility] , CT, CT ANGIOGRAPHY, CHEST (CPT=71275), 5/18/2022, 9:06 AM.  INDICATIONS:  DIGNA  TECHNIQUE:  IV contrast-enhanced multislice CT angiography is performed through the pulmonary arterial anatomy. 3D volume renderings are generated. Dose reduction techniques were used. Dose information is transmitted to the [facility]) NRDR ([facility] [address]) which includes the Dose Index Registry. PATIENT STATED HISTORY:(As transcribed by Technologist)  Patient is covid positive with difficulty breathing, cough, fever and elevated D dimer   CONTRAST USED:  100cc of Isovue 370  FINDINGS:  VASCULATURE:  There are subsegmental filling defects noted in anterior branch to the right upper lobe and a pickle branch to the left upper lobe seen on series 301, image 116 and series 605, image 48 respectively. There is no significant motion artifact  in the segments of the lung. These are consistent with small pulmonary emboli. There is overall very low clot burden with no right heart strain. THORACIC AORTA:  The thoracic aorta is normal.  There is mild ectasia of the left subclavian artery origin which measures up to 2.2 cm (by comparison the right subclavian artery measures a maximum diameter of 1.6 cm). There is no evidence of dissection. LUNGS:  There are patchy ground-glass densities in the lower lungs bilaterally. Finding is less conspicuous than on previous study and could represent resolving atypical pneumonia or recurrent atypical pneumonia. Nodular area of atelectasis is noted in  the inferior lingula which is a stable finding. MEDIASTINUM:  No adenopathy or mass. CELIA:  No mass or adenopathy. CARDIAC:  No enlargement, pericardial thickening, or significant calcification. PLEURA:  No mass or effusion. CHEST WALL:  No mass or axillary adenopathy.   LIMITED ABDOMEN: Limited images of the upper abdomen are unremarkable. BONES:  No bony lesion or fracture. OTHER:  Negative. CONCLUSION:  1. There are subsegmental filling defects in branches to the upper lobes bilaterally consistent with pulmonary emboli. There is overall very low clot burden and no significant right heart strain. 2. There is patchy ground-glass density noted at the periphery of the lower lobes bilaterally. This is actually less conspicuous than on previous study and could represent resolving atypical pneumonia or recurrent atypical pneumonia. 3. Mild ectasia of the left subclavian artery origin is noted. This is most likely developmental and is not changed in retrospect. The thoracic aorta is otherwise normal.  Above findings were discussed with Dr. Ernie Hope on June 22, 2022 at 4:54 p.m. Lizeth Cordero Dictated by (CST): Fan Cortes MD on 6/22/2022 at 4:41 PM     Finalized by (CST): Fan Cortes MD on 6/22/2022 at 4:54 PM       Patient has small subsegmental pulmonary emboli and has been ordered for heparin drip. The hospitalist, Dr. Wesly Hernandez, was notified.

## 2022-06-22 NOTE — ED QUICK NOTES
Unable to get a second set of cx. States has a hx of being a difficult stick. Needs an AC IV for CTA of the chest.  Asked charge RN for an ultrasound IV, who [facility] will place.

## 2022-06-22 NOTE — ED QUICK NOTES
Orders for admission, patient is aware of plan and ready to go upstairs. Any questions, please call ED RN Candice Gonzalez  at extension [phone]. Vaccinated? Yes  Type of COVID test sent:Rapid  COVID Suspicion level: High      Titratable drug(s) infusing:N/A  Rate:    LOC at time of transport:Alert and oriented    Other pertinent information:Wife also has Covid    CIWA score=N/A  NIH score=N/A

## 2022-06-23 ENCOUNTER — APPOINTMENT (OUTPATIENT)
Dept: ULTRASOUND IMAGING | Facility: HOSPITAL | Age: 62
End: 2022-06-23
Attending: INTERNAL MEDICINE
Payer: COMMERCIAL

## 2022-06-23 ENCOUNTER — APPOINTMENT (OUTPATIENT)
Dept: CV DIAGNOSTICS | Facility: HOSPITAL | Age: 62
End: 2022-06-23
Attending: INTERNAL MEDICINE
Payer: COMMERCIAL

## 2022-06-23 LAB
ALBUMIN SERPL-MCNC: 2.9 G/DL (ref 3.4–5)
ALBUMIN/GLOB SERPL: 0.7 {RATIO} (ref 1–2)
ALP LIVER SERPL-CCNC: 66 U/L
ALT SERPL-CCNC: 18 U/L
ANION GAP SERPL CALC-SCNC: 6 MMOL/L (ref 0–18)
ANION GAP SERPL CALC-SCNC: 8 MMOL/L (ref 0–18)
APTT PPP: 178.8 SECONDS (ref 23.3–35.6)
APTT PPP: 74.6 SECONDS (ref 23.3–35.6)
APTT PPP: >240 SECONDS (ref 23.3–35.6)
AST SERPL-CCNC: 8 U/L (ref 15–37)
ATRIAL RATE: 121 BPM
BASOPHILS # BLD AUTO: 0.05 X10(3) UL (ref 0–0.2)
BASOPHILS NFR BLD AUTO: 0.5 %
BILIRUB SERPL-MCNC: 0.3 MG/DL (ref 0.1–2)
BUN BLD-MCNC: 20 MG/DL (ref 7–18)
BUN BLD-MCNC: 25 MG/DL (ref 7–18)
CALCIUM BLD-MCNC: 9.2 MG/DL (ref 8.5–10.1)
CALCIUM BLD-MCNC: 9.9 MG/DL (ref 8.5–10.1)
CHLORIDE SERPL-SCNC: 106 MMOL/L (ref 98–112)
CHLORIDE SERPL-SCNC: 106 MMOL/L (ref 98–112)
CO2 SERPL-SCNC: 26 MMOL/L (ref 21–32)
CO2 SERPL-SCNC: 26 MMOL/L (ref 21–32)
CREAT BLD-MCNC: 1.01 MG/DL
CREAT BLD-MCNC: 1.42 MG/DL
CRP SERPL-MCNC: 7.87 MG/DL (ref ?–0.3)
D DIMER PPP FEU-MCNC: 1.6 UG/ML FEU (ref ?–0.63)
DEPRECATED HBV CORE AB SER IA-ACNC: 414.4 NG/ML
EOSINOPHIL # BLD AUTO: 0.01 X10(3) UL (ref 0–0.7)
EOSINOPHIL NFR BLD AUTO: 0.1 %
ERYTHROCYTE [DISTWIDTH] IN BLOOD BY AUTOMATED COUNT: 13.5 %
ERYTHROCYTE [DISTWIDTH] IN BLOOD BY AUTOMATED COUNT: 13.7 %
GLOBULIN PLAS-MCNC: 4.2 G/DL (ref 2.8–4.4)
GLUCOSE BLD-MCNC: 119 MG/DL (ref 70–99)
GLUCOSE BLD-MCNC: 134 MG/DL (ref 70–99)
HCT VFR BLD AUTO: 41.8 %
HCT VFR BLD AUTO: 46.9 %
HGB BLD-MCNC: 13.2 G/DL
HGB BLD-MCNC: 14.8 G/DL
IMM GRANULOCYTES # BLD AUTO: 0.1 X10(3) UL (ref 0–1)
IMM GRANULOCYTES NFR BLD: 1 %
LDH SERPL L TO P-CCNC: 214 U/L
LYMPHOCYTES # BLD AUTO: 1.48 X10(3) UL (ref 1–4)
LYMPHOCYTES NFR BLD AUTO: 15.2 %
MCH RBC QN AUTO: 28.7 PG (ref 26–34)
MCH RBC QN AUTO: 28.9 PG (ref 26–34)
MCHC RBC AUTO-ENTMCNC: 31.6 G/DL (ref 31–37)
MCHC RBC AUTO-ENTMCNC: 31.6 G/DL (ref 31–37)
MCV RBC AUTO: 90.9 FL
MCV RBC AUTO: 91.6 FL
MONOCYTES # BLD AUTO: 0.85 X10(3) UL (ref 0.1–1)
MONOCYTES NFR BLD AUTO: 8.7 %
NEUTROPHILS # BLD AUTO: 7.27 X10 (3) UL (ref 1.5–7.7)
NEUTROPHILS # BLD AUTO: 7.27 X10(3) UL (ref 1.5–7.7)
NEUTROPHILS NFR BLD AUTO: 74.5 %
OSMOLALITY SERPL CALC.SUM OF ELEC: 290 MOSM/KG (ref 275–295)
OSMOLALITY SERPL CALC.SUM OF ELEC: 296 MOSM/KG (ref 275–295)
P AXIS: 65 DEGREES
P-R INTERVAL: 166 MS
PLATELET # BLD AUTO: 404 10(3)UL (ref 150–450)
PLATELET # BLD AUTO: 477 10(3)UL (ref 150–450)
POTASSIUM SERPL-SCNC: 4 MMOL/L (ref 3.5–5.1)
POTASSIUM SERPL-SCNC: 4.7 MMOL/L (ref 3.5–5.1)
PROT SERPL-MCNC: 7.1 G/DL (ref 6.4–8.2)
Q-T INTERVAL: 312 MS
QRS DURATION: 82 MS
QTC CALCULATION (BEZET): 443 MS
R AXIS: 28 DEGREES
RBC # BLD AUTO: 4.6 X10(6)UL
RBC # BLD AUTO: 5.12 X10(6)UL
SODIUM SERPL-SCNC: 138 MMOL/L (ref 136–145)
SODIUM SERPL-SCNC: 140 MMOL/L (ref 136–145)
T AXIS: 73 DEGREES
VENTRICULAR RATE: 121 BPM
WBC # BLD AUTO: 13.1 X10(3) UL (ref 4–11)
WBC # BLD AUTO: 9.8 X10(3) UL (ref 4–11)

## 2022-06-23 PROCEDURE — 93306 TTE W/DOPPLER COMPLETE: CPT | Performed by: INTERNAL MEDICINE

## 2022-06-23 PROCEDURE — 99232 SBSQ HOSP IP/OBS MODERATE 35: CPT | Performed by: HOSPITALIST

## 2022-06-23 PROCEDURE — 93970 EXTREMITY STUDY: CPT | Performed by: INTERNAL MEDICINE

## 2022-06-23 RX ORDER — FUROSEMIDE 10 MG/ML
20 INJECTION INTRAMUSCULAR; INTRAVENOUS ONCE
Status: COMPLETED | OUTPATIENT
Start: 2022-06-23 | End: 2022-06-23

## 2022-06-24 VITALS
HEART RATE: 83 BPM | HEIGHT: 66 IN | SYSTOLIC BLOOD PRESSURE: 125 MMHG | BODY MASS INDEX: 35.03 KG/M2 | TEMPERATURE: 98 F | OXYGEN SATURATION: 98 % | RESPIRATION RATE: 18 BRPM | WEIGHT: 218 LBS | DIASTOLIC BLOOD PRESSURE: 71 MMHG

## 2022-06-24 LAB
ALBUMIN SERPL-MCNC: 2.9 G/DL (ref 3.4–5)
ALBUMIN/GLOB SERPL: 0.7 {RATIO} (ref 1–2)
ALP LIVER SERPL-CCNC: 70 U/L
ALT SERPL-CCNC: 18 U/L
ANION GAP SERPL CALC-SCNC: 6 MMOL/L (ref 0–18)
APTT PPP: 73.9 SECONDS (ref 23.3–35.6)
AST SERPL-CCNC: 16 U/L (ref 15–37)
BASOPHILS # BLD AUTO: 0.07 X10(3) UL (ref 0–0.2)
BASOPHILS NFR BLD AUTO: 0.4 %
BILIRUB SERPL-MCNC: 0.2 MG/DL (ref 0.1–2)
BUN BLD-MCNC: 37 MG/DL (ref 7–18)
CALCIUM BLD-MCNC: 9 MG/DL (ref 8.5–10.1)
CHLORIDE SERPL-SCNC: 109 MMOL/L (ref 98–112)
CO2 SERPL-SCNC: 21 MMOL/L (ref 21–32)
CREAT BLD-MCNC: 1.15 MG/DL
CRP SERPL-MCNC: 4.23 MG/DL (ref ?–0.3)
D DIMER PPP FEU-MCNC: 1.54 UG/ML FEU (ref ?–0.63)
DEPRECATED HBV CORE AB SER IA-ACNC: 531.4 NG/ML
EOSINOPHIL # BLD AUTO: 0.03 X10(3) UL (ref 0–0.7)
EOSINOPHIL NFR BLD AUTO: 0.2 %
ERYTHROCYTE [DISTWIDTH] IN BLOOD BY AUTOMATED COUNT: 13.8 %
GLOBULIN PLAS-MCNC: 4.1 G/DL (ref 2.8–4.4)
GLUCOSE BLD-MCNC: 103 MG/DL (ref 70–99)
HCT VFR BLD AUTO: 44.5 %
HGB BLD-MCNC: 13.8 G/DL
IMM GRANULOCYTES # BLD AUTO: 0.14 X10(3) UL (ref 0–1)
IMM GRANULOCYTES NFR BLD: 0.9 %
LDH SERPL L TO P-CCNC: 239 U/L
LYMPHOCYTES # BLD AUTO: 2.41 X10(3) UL (ref 1–4)
LYMPHOCYTES NFR BLD AUTO: 15.2 %
MCH RBC QN AUTO: 28.3 PG (ref 26–34)
MCHC RBC AUTO-ENTMCNC: 31 G/DL (ref 31–37)
MCV RBC AUTO: 91.4 FL
MONOCYTES # BLD AUTO: 1.05 X10(3) UL (ref 0.1–1)
MONOCYTES NFR BLD AUTO: 6.6 %
NEUTROPHILS # BLD AUTO: 12.17 X10 (3) UL (ref 1.5–7.7)
NEUTROPHILS # BLD AUTO: 12.17 X10(3) UL (ref 1.5–7.7)
NEUTROPHILS NFR BLD AUTO: 76.7 %
OSMOLALITY SERPL CALC.SUM OF ELEC: 291 MOSM/KG (ref 275–295)
PLATELET # BLD AUTO: 471 10(3)UL (ref 150–450)
POTASSIUM SERPL-SCNC: 4.1 MMOL/L (ref 3.5–5.1)
PROT SERPL-MCNC: 7 G/DL (ref 6.4–8.2)
RBC # BLD AUTO: 4.87 X10(6)UL
SODIUM SERPL-SCNC: 136 MMOL/L (ref 136–145)
WBC # BLD AUTO: 15.9 X10(3) UL (ref 4–11)

## 2022-06-24 PROCEDURE — 99239 HOSP IP/OBS DSCHRG MGMT >30: CPT | Performed by: HOSPITALIST

## 2022-06-24 RX ORDER — GUAIFENESIN 600 MG
600 TABLET, EXTENDED RELEASE 12 HR ORAL 2 TIMES DAILY
Qty: 10 TABLET | Refills: 0 | Status: SHIPPED | OUTPATIENT
Start: 2022-06-24 | End: 2022-06-24

## 2022-06-24 RX ORDER — GUAIFENESIN 600 MG
600 TABLET, EXTENDED RELEASE 12 HR ORAL 2 TIMES DAILY
Qty: 10 TABLET | Refills: 0 | Status: SHIPPED | OUTPATIENT
Start: 2022-06-24

## 2022-06-24 RX ORDER — ALBUTEROL SULFATE 90 UG/1
4 AEROSOL, METERED RESPIRATORY (INHALATION) EVERY 4 HOURS PRN
Qty: 1 EACH | Refills: 0 | Status: SHIPPED | OUTPATIENT
Start: 2022-06-24

## 2022-06-24 NOTE — PROGRESS NOTES
06/24/22 1700   Mobility   O2 walk?  Yes   SPO2% on Room Air at Rest 100   SPO2% Ambulation on Room Air 94

## 2022-06-24 NOTE — PLAN OF CARE
NURSING DISCHARGE NOTE    Discharged Home via Wheelchair. Accompanied by Support staff  Belongings Taken by patient/family. Patient is stable to discharge home. Medically cleared by ID and hospitalist. Reviewed discharge instructions about medications and post-discharge follow up visits with patient. Patient verbalized understanding. Questions and concerns addressed. IV line dc'ed, pressure and dressing applied. Clean/dry/intact. Discharge navigator completed. Tele box removed, cleaned, and returned to drawer. Prescription medications filled by [facility] outpatient pharmacy, received by patient. All other patient's belongings sent with patient.       Problem: Patient/Family Goals  Goal: Patient/Family Long Term Goal  Description: Patient's Long Term Goal: go home    Interventions:  - consults  -meds  -IS  - See additional Care Plan goals for specific interventions  6/24/2022 1608 by Jennifer Chacko RN  Outcome: Completed  6/24/2022 1143 by Jennifer Chacko RN  Outcome: Progressing  Goal: Patient/Family Short Term Goal  Description: Patient's Short Term Goal: wean off oxygen and feel better  6/22 NOC: achieve therapeutic APTT  6/23 AM: tolerate RA  6/23 noc: remain therapeutic aptt  6/24 AM: go home soon    Interventions:   - IS  - meds  -heparin gtt  - See additional Care Plan goals for specific interventions  6/24/2022 1608 by Jennifer Chacko RN  Outcome: Completed  6/24/2022 1143 by Jennifer Chacko RN  Outcome: Progressing     Problem: RESPIRATORY - ADULT  Goal: Achieves optimal ventilation and oxygenation  Description: INTERVENTIONS:  - Assess for changes in respiratory status  - Assess for changes in mentation and behavior  - Position to facilitate oxygenation and minimize respiratory effort  - Oxygen supplementation based on oxygen saturation or ABGs  - Provide Smoking Cessation handout, if applicable  - Encourage broncho-pulmonary hygiene including cough, deep breathe, Incentive Spirometry  - Assess the need for suctioning and perform as needed  - Assess and instruct to report SOB or any respiratory difficulty  - Respiratory Therapy support as indicated  - Manage/alleviate anxiety  - Monitor for signs/symptoms of CO2 retention  6/24/2022 1608 by Roberto Paz RN  Outcome: Completed  6/24/2022 1143 by Roberto Paz RN  Outcome: Progressing     Problem: PAIN - ADULT  Goal: Verbalizes/displays adequate comfort level or patient's stated pain goal  Description: INTERVENTIONS:  - Encourage pt to monitor pain and request assistance  - Assess pain using appropriate pain scale  - Administer analgesics based on type and severity of pain and evaluate response  - Implement non-pharmacological measures as appropriate and evaluate response  - Consider cultural and social influences on pain and pain management  - Manage/alleviate anxiety  - Utilize distraction and/or relaxation techniques  - Monitor for opioid side effects  - Notify MD/LIP if interventions unsuccessful or patient reports new pain  - Anticipate increased pain with activity and pre-medicate as appropriate  6/24/2022 1608 by Roberto Paz RN  Outcome: Completed  6/24/2022 1143 by Roberto Paz RN  Outcome: Progressing     Problem: SAFETY ADULT - FALL  Goal: Free from fall injury  Description: INTERVENTIONS:  - Assess pt frequently for physical needs  - Identify cognitive and physical deficits and behaviors that affect risk of falls.   - [place] fall precautions as indicated by assessment.  - Educate pt/family on patient safety including physical limitations  - Instruct pt to call for assistance with activity based on assessment  - Modify environment to reduce risk of injury  - Provide assistive devices as appropriate  - Consider OT/PT consult to assist with strengthening/mobility  - Encourage toileting schedule  6/24/2022 1608 by Roberto Paz RN  Outcome: Completed  6/24/2022 [phone] by Gus Quinn RN  Outcome: Progressing     Problem: DISCHARGE PLANNING  Goal: Discharge to home or other facility with appropriate resources  Description: INTERVENTIONS:  - Identify barriers to discharge w/pt and caregiver  - Include patient/family/discharge partner in discharge planning  - Arrange for needed discharge resources and transportation as appropriate  - Identify discharge learning needs (meds, wound care, etc)  - Arrange for interpreters to assist at discharge as needed  - Consider post-discharge preferences of patient/family/discharge partner  - Complete POLST form as appropriate  - Assess patient's ability to be responsible for managing their own health  - Refer to Case Management Department for coordinating discharge planning if the patient needs post-hospital services based on physician/LIP order or complex needs related to functional status, cognitive ability or social support system  6/24/2022 1608 by Gus Quinn RN  Outcome: Completed  6/24/2022 1143 by Gus Quinn RN  Outcome: Progressing     Problem: METABOLIC/FLUID AND ELECTROLYTES - ADULT  Goal: Electrolytes maintained within normal limits  Description: INTERVENTIONS:  - Monitor labs and rhythm and assess patient for signs and symptoms of electrolyte imbalances  - Administer electrolyte replacement as ordered  - Monitor response to electrolyte replacements, including rhythm and repeat lab results as appropriate  - Fluid restriction as ordered  - Instruct patient on fluid and nutrition restrictions as appropriate  6/24/2022 1608 by Gus Quinn RN  Outcome: Completed  6/24/2022 1143 by Gus Quinn RN  Outcome: Progressing

## 2022-06-24 NOTE — PROGRESS NOTES
06/23/22 1700   Mobility   O2 walk?  Yes   SPO2% on Room Air at Rest 94   SPO2% Ambulation on Room Air 92

## 2022-06-24 NOTE — CM/SW NOTE
RN requested Eliquis med check. Script sent to [facility]. Spoke with Shania at [facility] at [facility] to [facility]. After insurance and coupons applied the medication is no cost to pt. Informed Shania of pt's discharge scheduled for today. [facility] will fill prescription.      SILVINA Campbell  Discharge Planner

## 2022-06-25 NOTE — PAYOR COMM NOTE
--------------  DISCHARGE REVIEW    Payor: [email or web address]  Subscriber #:  [id number]  Authorization Number: [id number]    Admit date: 6/22/22  Admit time:   5:33 PM  Discharge Date: 6/24/2022  6:08 PM     Admitting Physician: Kelsie Vallecillo MD  Attending Physician:  No att. providers found  Primary Care Physician: Jacinto Mcginnis DO

## 2022-06-27 ENCOUNTER — PATIENT OUTREACH (OUTPATIENT)
Dept: CASE MANAGEMENT | Age: 62
End: 2022-06-27

## 2022-06-27 DIAGNOSIS — Z02.9 ENCOUNTERS FOR ADMINISTRATIVE PURPOSE: ICD-10-CM

## 2022-06-27 DIAGNOSIS — J96.01 ACUTE HYPOXEMIC RESPIRATORY FAILURE (HCC): ICD-10-CM

## 2022-06-27 RX ORDER — ATENOLOL 25 MG/1
25 TABLET ORAL DAILY
COMMUNITY

## 2022-06-29 ENCOUNTER — TELEMEDICINE (OUTPATIENT)
Dept: FAMILY MEDICINE CLINIC | Facility: CLINIC | Age: 62
End: 2022-06-29

## 2022-06-29 ENCOUNTER — TELEPHONE (OUTPATIENT)
Dept: HEMATOLOGY/ONCOLOGY | Facility: HOSPITAL | Age: 62
End: 2022-06-29

## 2022-06-29 DIAGNOSIS — I26.94 MULTIPLE SUBSEGMENTAL PULMONARY EMBOLI WITHOUT ACUTE COR PULMONALE (HCC): Primary | ICD-10-CM

## 2022-06-29 DIAGNOSIS — I10 ESSENTIAL HYPERTENSION: ICD-10-CM

## 2022-06-29 DIAGNOSIS — I50.32 CHRONIC DIASTOLIC HEART FAILURE (HCC): ICD-10-CM

## 2022-06-29 DIAGNOSIS — Z86.16 HISTORY OF COVID-19: ICD-10-CM

## 2022-07-06 ENCOUNTER — HOSPITAL ENCOUNTER (INPATIENT)
Facility: HOSPITAL | Age: 62
LOS: 15 days | Discharge: HOME | End: 2022-07-21
Attending: EMERGENCY MEDICINE | Admitting: INTERNAL MEDICINE
Payer: COMMERCIAL

## 2022-07-06 ENCOUNTER — APPOINTMENT (OUTPATIENT)
Dept: GENERAL RADIOLOGY | Facility: HOSPITAL | Age: 62
End: 2022-07-06
Attending: EMERGENCY MEDICINE
Payer: COMMERCIAL

## 2022-07-06 DIAGNOSIS — I50.31 ACUTE DIASTOLIC CONGESTIVE HEART FAILURE (HCC): ICD-10-CM

## 2022-07-06 DIAGNOSIS — Z87.09 HISTORY OF RESPIRATORY FAILURE: ICD-10-CM

## 2022-07-06 DIAGNOSIS — J96.01 ACUTE RESPIRATORY FAILURE WITH HYPOXIA (HCC): Primary | ICD-10-CM

## 2022-07-06 LAB
ADENOVIRUS PCR:: NOT DETECTED
ALBUMIN SERPL-MCNC: 3.2 G/DL (ref 3.4–5)
ALBUMIN/GLOB SERPL: 0.7 {RATIO} (ref 1–2)
ALP LIVER SERPL-CCNC: 73 U/L
ALT SERPL-CCNC: 20 U/L
ANION GAP SERPL CALC-SCNC: 6 MMOL/L (ref 0–18)
APTT PPP: 45.1 SECONDS (ref 23.3–35.6)
AST SERPL-CCNC: 14 U/L (ref 15–37)
ATRIAL RATE: 136 BPM
B PARAPERT DNA SPEC QL NAA+PROBE: NOT DETECTED
B PERT DNA SPEC QL NAA+PROBE: NOT DETECTED
BASOPHILS # BLD AUTO: 0.11 X10(3) UL (ref 0–0.2)
BASOPHILS NFR BLD AUTO: 0.7 %
BILIRUB SERPL-MCNC: 0.9 MG/DL (ref 0.1–2)
BUN BLD-MCNC: 15 MG/DL (ref 7–18)
C PNEUM DNA SPEC QL NAA+PROBE: NOT DETECTED
CALCIUM BLD-MCNC: 8.8 MG/DL (ref 8.5–10.1)
CHLORIDE SERPL-SCNC: 106 MMOL/L (ref 98–112)
CO2 SERPL-SCNC: 23 MMOL/L (ref 21–32)
CORONAVIRUS 229E PCR:: NOT DETECTED
CORONAVIRUS HKU1 PCR:: NOT DETECTED
CORONAVIRUS NL63 PCR:: NOT DETECTED
CORONAVIRUS OC43 PCR:: NOT DETECTED
CREAT BLD-MCNC: 1.04 MG/DL
D DIMER PPP FEU-MCNC: <0.27 UG/ML FEU (ref ?–0.63)
EOSINOPHIL # BLD AUTO: 0.04 X10(3) UL (ref 0–0.7)
EOSINOPHIL NFR BLD AUTO: 0.2 %
ERYTHROCYTE [DISTWIDTH] IN BLOOD BY AUTOMATED COUNT: 14.1 %
FLUAV RNA SPEC QL NAA+PROBE: NOT DETECTED
FLUBV RNA SPEC QL NAA+PROBE: NOT DETECTED
GLOBULIN PLAS-MCNC: 4.3 G/DL (ref 2.8–4.4)
GLUCOSE BLD-MCNC: 112 MG/DL (ref 70–99)
HCT VFR BLD AUTO: 43.2 %
HGB BLD-MCNC: 13.9 G/DL
IMM GRANULOCYTES # BLD AUTO: 0.14 X10(3) UL (ref 0–1)
IMM GRANULOCYTES NFR BLD: 0.9 %
INR BLD: 1.48 (ref 0.8–1.2)
LYMPHOCYTES # BLD AUTO: 0.83 X10(3) UL (ref 1–4)
LYMPHOCYTES NFR BLD AUTO: 5.1 %
MCH RBC QN AUTO: 29 PG (ref 26–34)
MCHC RBC AUTO-ENTMCNC: 32.2 G/DL (ref 31–37)
MCV RBC AUTO: 90.2 FL
METAPNEUMOVIRUS PCR:: DETECTED
MONOCYTES # BLD AUTO: 0.89 X10(3) UL (ref 0.1–1)
MONOCYTES NFR BLD AUTO: 5.5 %
MYCOPLASMA PNEUMONIA PCR:: NOT DETECTED
NEUTROPHILS # BLD AUTO: 14.29 X10 (3) UL (ref 1.5–7.7)
NEUTROPHILS # BLD AUTO: 14.29 X10(3) UL (ref 1.5–7.7)
NEUTROPHILS NFR BLD AUTO: 87.6 %
NT-PROBNP SERPL-MCNC: 152 PG/ML (ref ?–125)
OSMOLALITY SERPL CALC.SUM OF ELEC: 282 MOSM/KG (ref 275–295)
P AXIS: 63 DEGREES
P-R INTERVAL: 142 MS
PARAINFLUENZA 1 PCR:: NOT DETECTED
PARAINFLUENZA 2 PCR:: NOT DETECTED
PARAINFLUENZA 3 PCR:: NOT DETECTED
PARAINFLUENZA 4 PCR:: NOT DETECTED
PLATELET # BLD AUTO: 289 10(3)UL (ref 150–450)
POTASSIUM SERPL-SCNC: 3.8 MMOL/L (ref 3.5–5.1)
PROCALCITONIN SERPL-MCNC: 0.13 NG/ML (ref ?–0.16)
PROT SERPL-MCNC: 7.5 G/DL (ref 6.4–8.2)
PROTHROMBIN TIME: 18 SECONDS (ref 11.6–14.8)
Q-T INTERVAL: 354 MS
QRS DURATION: 82 MS
QTC CALCULATION (BEZET): 532 MS
R AXIS: 40 DEGREES
RBC # BLD AUTO: 4.79 X10(6)UL
RHINOVIRUS/ENTERO PCR:: NOT DETECTED
RSV RNA SPEC QL NAA+PROBE: NOT DETECTED
SARS-COV-2 RNA NPH QL NAA+NON-PROBE: NOT DETECTED
SODIUM SERPL-SCNC: 135 MMOL/L (ref 136–145)
T AXIS: 70 DEGREES
TROPONIN I HIGH SENSITIVITY: 5 NG/L
VENTRICULAR RATE: 136 BPM
WBC # BLD AUTO: 16.3 X10(3) UL (ref 4–11)

## 2022-07-06 PROCEDURE — 99223 1ST HOSP IP/OBS HIGH 75: CPT | Performed by: INTERNAL MEDICINE

## 2022-07-06 PROCEDURE — 71045 X-RAY EXAM CHEST 1 VIEW: CPT | Performed by: EMERGENCY MEDICINE

## 2022-07-06 RX ORDER — MELATONIN
3 NIGHTLY PRN
Status: DISCONTINUED | OUTPATIENT
Start: 2022-07-06 | End: 2022-07-21

## 2022-07-06 RX ORDER — ONDANSETRON 2 MG/ML
4 INJECTION INTRAMUSCULAR; INTRAVENOUS EVERY 6 HOURS PRN
Status: DISCONTINUED | OUTPATIENT
Start: 2022-07-06 | End: 2022-07-21

## 2022-07-06 RX ORDER — POTASSIUM CHLORIDE 20 MEQ/1
40 TABLET, EXTENDED RELEASE ORAL ONCE
Status: COMPLETED | OUTPATIENT
Start: 2022-07-06 | End: 2022-07-06

## 2022-07-06 RX ORDER — ACETAMINOPHEN 500 MG
500 TABLET ORAL EVERY 4 HOURS PRN
Status: DISCONTINUED | OUTPATIENT
Start: 2022-07-06 | End: 2022-07-08

## 2022-07-06 RX ORDER — FLUTICASONE FUROATE AND VILANTEROL 200; 25 UG/1; UG/1
1 POWDER RESPIRATORY (INHALATION) DAILY
Status: DISCONTINUED | OUTPATIENT
Start: 2022-07-07 | End: 2022-07-21

## 2022-07-06 RX ORDER — PROCHLORPERAZINE EDISYLATE 5 MG/ML
5 INJECTION INTRAMUSCULAR; INTRAVENOUS EVERY 8 HOURS PRN
Status: DISCONTINUED | OUTPATIENT
Start: 2022-07-06 | End: 2022-07-21

## 2022-07-06 RX ORDER — BENZONATATE 200 MG/1
200 CAPSULE ORAL 3 TIMES DAILY PRN
Status: DISCONTINUED | OUTPATIENT
Start: 2022-07-06 | End: 2022-07-17

## 2022-07-06 RX ORDER — ATENOLOL 25 MG/1
25 TABLET ORAL DAILY
Status: DISCONTINUED | OUTPATIENT
Start: 2022-07-07 | End: 2022-07-18

## 2022-07-06 RX ORDER — SODIUM CHLORIDE 9 MG/ML
INJECTION, SOLUTION INTRAVENOUS CONTINUOUS
Status: ACTIVE | OUTPATIENT
Start: 2022-07-06 | End: 2022-07-07

## 2022-07-06 RX ORDER — ACETAMINOPHEN 500 MG
1000 TABLET ORAL ONCE
Status: COMPLETED | OUTPATIENT
Start: 2022-07-06 | End: 2022-07-06

## 2022-07-06 RX ORDER — ALBUTEROL SULFATE 90 UG/1
8 AEROSOL, METERED RESPIRATORY (INHALATION) ONCE
Status: COMPLETED | OUTPATIENT
Start: 2022-07-06 | End: 2022-07-06

## 2022-07-06 RX ORDER — ALBUTEROL SULFATE 90 UG/1
4 AEROSOL, METERED RESPIRATORY (INHALATION) EVERY 4 HOURS PRN
Status: DISCONTINUED | OUTPATIENT
Start: 2022-07-06 | End: 2022-07-07

## 2022-07-06 RX ORDER — GUAIFENESIN 600 MG
600 TABLET, EXTENDED RELEASE 12 HR ORAL 2 TIMES DAILY
Status: DISCONTINUED | OUTPATIENT
Start: 2022-07-06 | End: 2022-07-08

## 2022-07-06 NOTE — PLAN OF CARE
NURSING ADMISSION NOTE      Patient admitted via Cart  Oriented to room. Safety precautions initiated. Bed in low position. Call light in reach. A&Ox4. Maintaining O2 sats >90% on 3L. VSS. Afebrile. ST on tele. Eliquis. Voids. Up self. Regular diet. Med rec and admission navigator completed with patient. Pt updated on POC, all questions answered at this time. Pt stable at this time, call bell within reach.   Problem: Patient/Family Goals  Goal: Patient/Family Long Term Goal  Description: Patient's Long Term Goal: Discharge home with adequate resources    Interventions:    - See additional Care Plan goals for specific interventions  Outcome: Progressing  Goal: Patient/Family Short Term Goal  Description: Patient's Short Term Goal:   7/6 AM: wean O2    Interventions:     - See additional Care Plan goals for specific interventions  Outcome: Progressing

## 2022-07-06 NOTE — ED QUICK NOTES
Orders for admission, patient is aware of plan and ready to go upstairs. Any questions, please call ED RN Tracy Youssef at extension #[phone].      Patient Covid vaccination status: Fully vaccinated     COVID Test Ordered in ED: Respiratory Flu Expanded Panel + COVID-19    COVID Suspicion at Admission: COVID positive on 6/22/22    Running Infusions:  None    Mental Status/LOC at time of transport: A&Ox4     Other pertinent information: Pt is on 5L NC  CIWA score: N/A   NIH score:  N/A

## 2022-07-07 LAB — POTASSIUM SERPL-SCNC: 4 MMOL/L (ref 3.5–5.1)

## 2022-07-07 PROCEDURE — 99232 SBSQ HOSP IP/OBS MODERATE 35: CPT | Performed by: INTERNAL MEDICINE

## 2022-07-07 RX ORDER — METHYLPREDNISOLONE SODIUM SUCCINATE 40 MG/ML
40 INJECTION, POWDER, LYOPHILIZED, FOR SOLUTION INTRAMUSCULAR; INTRAVENOUS EVERY 12 HOURS
Status: COMPLETED | OUTPATIENT
Start: 2022-07-07 | End: 2022-07-07

## 2022-07-07 RX ORDER — PREDNISONE 20 MG/1
40 TABLET ORAL
Status: DISCONTINUED | OUTPATIENT
Start: 2022-07-08 | End: 2022-07-08

## 2022-07-07 RX ORDER — ALBUTEROL SULFATE 90 UG/1
4 AEROSOL, METERED RESPIRATORY (INHALATION)
Status: DISCONTINUED | OUTPATIENT
Start: 2022-07-07 | End: 2022-07-08

## 2022-07-07 RX ORDER — MULTIPLE VITAMINS W/ MINERALS TAB 9MG-400MCG
1 TAB ORAL DAILY
Status: DISCONTINUED | OUTPATIENT
Start: 2022-07-07 | End: 2022-07-21

## 2022-07-07 NOTE — PLAN OF CARE
Pt is aox4, VSS, afebrile. On 3.5L. baseline RA. Tele ST. Productive cough, see MAR. Up self to void. IV steroids. IV saline locked. Regular diet tolerating well. C/o SOB with exertion and occasionally at rest. No c/o pain or n/v/d. Resting in chair, with call light in place. Will continue to monitor.            Problem: RESPIRATORY - ADULT  Goal: Achieves optimal ventilation and oxygenation  Description: INTERVENTIONS:  - Assess for changes in respiratory status  - Assess for changes in mentation and behavior  - Position to facilitate oxygenation and minimize respiratory effort  - Oxygen supplementation based on oxygen saturation or ABGs  - Provide Smoking Cessation handout, if applicable  - Encourage broncho-pulmonary hygiene including cough, deep breathe, Incentive Spirometry  - Assess the need for suctioning and perform as needed  - Assess and instruct to report SOB or any respiratory difficulty  - Respiratory Therapy support as indicated  - Manage/alleviate anxiety  - Monitor for signs/symptoms of CO2 retention  Outcome: Progressing

## 2022-07-07 NOTE — PLAN OF CARE
Patient A&Ox4. Patient resting comfortably in bed, denies pain, VSS, no distress. Patient resting on 5L O2 NC. Patient remains on tele and  monitoring. Patient complains of constant dry cough, MD paged for additional interventions, PRN robitussin ordered, see MAR for administration details. Patient up adlib.    Problem: Patient/Family Goals  Goal: Patient/Family Long Term Goal  Description: Patient's Long Term Goal: Discharge home    Interventions:  - Supplemental O2  -Vital sign monitroing  - See additional Care Plan goals for specific interventions  Outcome: Progressing  Goal: Patient/Family Short Term Goal  Description: Patient's Short Term Goal:   7/6 AM: wean O2  7/6 noc : Wean O2, Rest/sleep    Interventions:     - See additional Care Plan goals for specific interventions  Outcome: Progressing     Problem: RESPIRATORY - ADULT  Goal: Achieves optimal ventilation and oxygenation  Description: INTERVENTIONS:  - Assess for changes in respiratory status  - Assess for changes in mentation and behavior  - Position to facilitate oxygenation and minimize respiratory effort  - Oxygen supplementation based on oxygen saturation or ABGs  - Provide Smoking Cessation handout, if applicable  - Encourage broncho-pulmonary hygiene including cough, deep breathe, Incentive Spirometry  - Assess the need for suctioning and perform as needed  - Assess and instruct to report SOB or any respiratory difficulty  - Respiratory Therapy support as indicated  - Manage/alleviate anxiety  - Monitor for signs/symptoms of CO2 retention  Outcome: Progressing

## 2022-07-08 ENCOUNTER — ANESTHESIA EVENT (OUTPATIENT)
Dept: MEDSURG UNIT | Facility: HOSPITAL | Age: 62
End: 2022-07-08
Payer: COMMERCIAL

## 2022-07-08 ENCOUNTER — APPOINTMENT (OUTPATIENT)
Dept: GENERAL RADIOLOGY | Facility: HOSPITAL | Age: 62
End: 2022-07-08
Attending: HOSPITALIST
Payer: COMMERCIAL

## 2022-07-08 ENCOUNTER — APPOINTMENT (OUTPATIENT)
Dept: GENERAL RADIOLOGY | Facility: HOSPITAL | Age: 62
End: 2022-07-08
Attending: NURSE PRACTITIONER
Payer: COMMERCIAL

## 2022-07-08 ENCOUNTER — ANESTHESIA (OUTPATIENT)
Dept: MEDSURG UNIT | Facility: HOSPITAL | Age: 62
End: 2022-07-08
Payer: COMMERCIAL

## 2022-07-08 LAB
ANION GAP SERPL CALC-SCNC: 10 MMOL/L (ref 0–18)
ARTERIAL PATENCY WRIST A: POSITIVE
BASE EXCESS BLDA CALC-SCNC: -2.2 MMOL/L (ref ?–2)
BASE EXCESS BLDA CALC-SCNC: 0 MMOL/L (ref ?–2)
BASE EXCESS BLDA CALC-SCNC: 0.2 MMOL/L (ref ?–2)
BASOPHILS # BLD AUTO: 0.06 X10(3) UL (ref 0–0.2)
BASOPHILS NFR BLD AUTO: 0.7 %
BILIRUB UR QL STRIP.AUTO: NEGATIVE
BODY TEMPERATURE: 98.6 F
BUN BLD-MCNC: 35 MG/DL (ref 7–18)
CA-I BLD-SCNC: 1.18 MMOL/L (ref 0.95–1.32)
CA-I BLD-SCNC: 1.21 MMOL/L (ref 0.95–1.32)
CA-I BLD-SCNC: 1.26 MMOL/L (ref 0.95–1.32)
CALCIUM BLD-MCNC: 9.3 MG/DL (ref 8.5–10.1)
CHLORIDE SERPL-SCNC: 105 MMOL/L (ref 98–112)
CLARITY UR REFRACT.AUTO: CLEAR
CO2 SERPL-SCNC: 24 MMOL/L (ref 21–32)
COHGB MFR BLD: 0.7 % SAT (ref 0–3)
COHGB MFR BLD: 0.9 % SAT (ref 0–3)
COHGB MFR BLD: 0.9 % SAT (ref 0–3)
COLOR UR AUTO: YELLOW
CREAT BLD-MCNC: 1.45 MG/DL
CRP SERPL-MCNC: 16.6 MG/DL (ref ?–0.3)
EOSINOPHIL # BLD AUTO: 0.01 X10(3) UL (ref 0–0.7)
EOSINOPHIL NFR BLD AUTO: 0.1 %
ERYTHROCYTE [DISTWIDTH] IN BLOOD BY AUTOMATED COUNT: 14 %
EXPIRATORY PRESSURE: 5 CM H2O
FIO2: 100 %
FIO2: 50 %
GLUCOSE BLD-MCNC: 207 MG/DL (ref 70–99)
GLUCOSE UR STRIP.AUTO-MCNC: NEGATIVE MG/DL
HCO3 BLDA-SCNC: 23.2 MEQ/L (ref 21–27)
HCO3 BLDA-SCNC: 24.9 MEQ/L (ref 21–27)
HCO3 BLDA-SCNC: 25.1 MEQ/L (ref 21–27)
HCT VFR BLD AUTO: 43.7 %
HGB BLD-MCNC: 13.6 G/DL
HGB BLD-MCNC: 14.2 G/DL
HGB BLD-MCNC: 14.7 G/DL
HGB BLD-MCNC: 14.8 G/DL
HYALINE CASTS #/AREA URNS AUTO: PRESENT /LPF
IMM GRANULOCYTES # BLD AUTO: 0.03 X10(3) UL (ref 0–1)
IMM GRANULOCYTES NFR BLD: 0.4 %
IPAP MAX: 35 CM H2O
IPAP MIN: 15 CM H2O
KETONES UR STRIP.AUTO-MCNC: NEGATIVE MG/DL
L/M: 10 L/MIN
LACTATE BLD-SCNC: 1.5 MMOL/L (ref 0.5–2)
LACTATE BLD-SCNC: 1.6 MMOL/L (ref 0.5–2)
LACTATE BLD-SCNC: 2.8 MMOL/L (ref 0.5–2)
LACTATE SERPL-SCNC: 2.5 MMOL/L (ref 0.4–2)
LEUKOCYTE ESTERASE UR QL STRIP.AUTO: NEGATIVE
LYMPHOCYTES # BLD AUTO: 0.92 X10(3) UL (ref 1–4)
LYMPHOCYTES NFR BLD AUTO: 11.2 %
MCH RBC QN AUTO: 28.7 PG (ref 26–34)
MCHC RBC AUTO-ENTMCNC: 31.1 G/DL (ref 31–37)
MCV RBC AUTO: 92.2 FL
METHGB MFR BLD: 1.1 % SAT (ref 0.4–1.5)
METHGB MFR BLD: 1.2 % SAT (ref 0.4–1.5)
METHGB MFR BLD: 1.3 % SAT (ref 0.4–1.5)
MONOCYTES # BLD AUTO: 1.23 X10(3) UL (ref 0.1–1)
MONOCYTES NFR BLD AUTO: 15 %
NEUTROPHILS # BLD AUTO: 5.95 X10 (3) UL (ref 1.5–7.7)
NEUTROPHILS # BLD AUTO: 5.95 X10(3) UL (ref 1.5–7.7)
NEUTROPHILS NFR BLD AUTO: 72.6 %
NITRITE UR QL STRIP.AUTO: NEGATIVE
NT-PROBNP SERPL-MCNC: 843 PG/ML (ref ?–125)
OSMOLALITY SERPL CALC.SUM OF ELEC: 302 MOSM/KG (ref 275–295)
OXYHGB MFR BLDA: 95.6 % (ref 92–100)
OXYHGB MFR BLDA: 96.1 % (ref 92–100)
OXYHGB MFR BLDA: 98 % (ref 92–100)
PCO2 BLDA: 44 MM HG (ref 35–45)
PCO2 BLDA: 53 MM HG (ref 35–45)
PCO2 BLDA: 58 MM HG (ref 35–45)
PEEP: 5 CM H2O
PEEP: 5 CM H2O
PH BLDA: 7.29 [PH] (ref 7.35–7.45)
PH BLDA: 7.32 [PH] (ref 7.35–7.45)
PH BLDA: 7.34 [PH] (ref 7.35–7.45)
PH UR STRIP.AUTO: 5.5 [PH] (ref 5–8)
PLATELET # BLD AUTO: 312 10(3)UL (ref 150–450)
PO2 BLDA: 534 MM HG (ref 80–100)
PO2 BLDA: 89 MM HG (ref 80–100)
PO2 BLDA: 99 MM HG (ref 80–100)
POTASSIUM BLD-SCNC: 4.1 MMOL/L (ref 3.6–5.1)
POTASSIUM BLD-SCNC: 4.2 MMOL/L (ref 3.6–5.1)
POTASSIUM BLD-SCNC: 4.2 MMOL/L (ref 3.6–5.1)
POTASSIUM SERPL-SCNC: 4.2 MMOL/L (ref 3.5–5.1)
RBC # BLD AUTO: 4.74 X10(6)UL
SODIUM BLD-SCNC: 134 MMOL/L (ref 135–145)
SODIUM BLD-SCNC: 135 MMOL/L (ref 135–145)
SODIUM BLD-SCNC: 135 MMOL/L (ref 135–145)
SODIUM SERPL-SCNC: 139 MMOL/L (ref 136–145)
SP GR UR STRIP.AUTO: >=1.03 (ref 1–1.03)
TIDAL VOLUME: 500 ML
TIDAL VOLUME: 500 ML
TRIGL SERPL-MCNC: 57 MG/DL (ref 30–149)
UROBILINOGEN UR STRIP.AUTO-MCNC: 0.2 MG/DL
VENT RATE: 100 /MIN
VENT RATE: 20 /MIN
WBC # BLD AUTO: 8.2 X10(3) UL (ref 4–11)

## 2022-07-08 PROCEDURE — 5A1955Z RESPIRATORY VENTILATION, GREATER THAN 96 CONSECUTIVE HOURS: ICD-10-PCS | Performed by: HOSPITALIST

## 2022-07-08 PROCEDURE — 5A09357 ASSISTANCE WITH RESPIRATORY VENTILATION, LESS THAN 24 CONSECUTIVE HOURS, CONTINUOUS POSITIVE AIRWAY PRESSURE: ICD-10-PCS | Performed by: HOSPITALIST

## 2022-07-08 PROCEDURE — 99232 SBSQ HOSP IP/OBS MODERATE 35: CPT | Performed by: HOSPITALIST

## 2022-07-08 PROCEDURE — 71045 X-RAY EXAM CHEST 1 VIEW: CPT | Performed by: NURSE PRACTITIONER

## 2022-07-08 PROCEDURE — 99291 CRITICAL CARE FIRST HOUR: CPT | Performed by: HOSPITALIST

## 2022-07-08 PROCEDURE — 71046 X-RAY EXAM CHEST 2 VIEWS: CPT | Performed by: HOSPITALIST

## 2022-07-08 PROCEDURE — 99233 SBSQ HOSP IP/OBS HIGH 50: CPT | Performed by: HOSPITALIST

## 2022-07-08 PROCEDURE — 0BH17EZ INSERTION OF ENDOTRACHEAL AIRWAY INTO TRACHEA, VIA NATURAL OR ARTIFICIAL OPENING: ICD-10-PCS | Performed by: ANESTHESIOLOGY

## 2022-07-08 RX ORDER — FUROSEMIDE 10 MG/ML
60 INJECTION INTRAMUSCULAR; INTRAVENOUS ONCE
Status: COMPLETED | OUTPATIENT
Start: 2022-07-08 | End: 2022-07-08

## 2022-07-08 RX ORDER — MIDAZOLAM HYDROCHLORIDE 1 MG/ML
INJECTION INTRAMUSCULAR; INTRAVENOUS
Status: COMPLETED
Start: 2022-07-08 | End: 2022-07-08

## 2022-07-08 RX ORDER — ACETAMINOPHEN 500 MG
1000 TABLET ORAL ONCE
Status: COMPLETED | OUTPATIENT
Start: 2022-07-08 | End: 2022-07-08

## 2022-07-08 RX ORDER — SODIUM PHOSPHATE, DIBASIC AND SODIUM PHOSPHATE, MONOBASIC 7; 19 G/133ML; G/133ML
1 ENEMA RECTAL ONCE AS NEEDED
Status: DISCONTINUED | OUTPATIENT
Start: 2022-07-08 | End: 2022-07-17

## 2022-07-08 RX ORDER — LORAZEPAM 0.5 MG/1
0.25 TABLET ORAL ONCE
Status: DISCONTINUED | OUTPATIENT
Start: 2022-07-08 | End: 2022-07-08

## 2022-07-08 RX ORDER — ACETAMINOPHEN 650 MG/1
650 SUPPOSITORY RECTAL EVERY 4 HOURS PRN
Status: DISCONTINUED | OUTPATIENT
Start: 2022-07-08 | End: 2022-07-17

## 2022-07-08 RX ORDER — ALBUTEROL SULFATE 2.5 MG/3ML
SOLUTION RESPIRATORY (INHALATION)
Status: COMPLETED
Start: 2022-07-08 | End: 2022-07-08

## 2022-07-08 RX ORDER — DEXMEDETOMIDINE HYDROCHLORIDE 4 UG/ML
INJECTION, SOLUTION INTRAVENOUS CONTINUOUS
Status: DISCONTINUED | OUTPATIENT
Start: 2022-07-08 | End: 2022-07-14

## 2022-07-08 RX ORDER — ETOMIDATE 2 MG/ML
INJECTION INTRAVENOUS
Status: COMPLETED
Start: 2022-07-08 | End: 2022-07-08

## 2022-07-08 RX ORDER — LORAZEPAM 0.5 MG/1
0.25 TABLET ORAL ONCE
Status: COMPLETED | OUTPATIENT
Start: 2022-07-08 | End: 2022-07-08

## 2022-07-08 RX ORDER — GUAIFENESIN 600 MG
1200 TABLET, EXTENDED RELEASE 12 HR ORAL 2 TIMES DAILY
Status: DISCONTINUED | OUTPATIENT
Start: 2022-07-08 | End: 2022-07-09

## 2022-07-08 RX ORDER — CHLORHEXIDINE GLUCONATE 0.12 MG/ML
15 RINSE ORAL
Status: DISCONTINUED | OUTPATIENT
Start: 2022-07-08 | End: 2022-07-14

## 2022-07-08 RX ORDER — POLYETHYLENE GLYCOL 3350 17 G/17G
17 POWDER, FOR SOLUTION ORAL DAILY PRN
Status: DISCONTINUED | OUTPATIENT
Start: 2022-07-08 | End: 2022-07-17

## 2022-07-08 RX ORDER — SENNOSIDES 8.8 MG/5ML
10 LIQUID ORAL NIGHTLY PRN
Status: DISCONTINUED | OUTPATIENT
Start: 2022-07-08 | End: 2022-07-21

## 2022-07-08 RX ORDER — MIDAZOLAM HYDROCHLORIDE 1 MG/ML
2 INJECTION INTRAMUSCULAR; INTRAVENOUS ONCE
Status: COMPLETED | OUTPATIENT
Start: 2022-07-08 | End: 2022-07-08

## 2022-07-08 RX ORDER — ACETAMINOPHEN 160 MG/5ML
650 SOLUTION ORAL EVERY 4 HOURS PRN
Status: DISCONTINUED | OUTPATIENT
Start: 2022-07-08 | End: 2022-07-17

## 2022-07-08 RX ORDER — ACETAMINOPHEN 10 MG/ML
1000 INJECTION, SOLUTION INTRAVENOUS EVERY 6 HOURS PRN
Status: DISCONTINUED | OUTPATIENT
Start: 2022-07-08 | End: 2022-07-17

## 2022-07-08 RX ORDER — ACETAMINOPHEN 325 MG/1
650 TABLET ORAL EVERY 4 HOURS PRN
Status: DISCONTINUED | OUTPATIENT
Start: 2022-07-08 | End: 2022-07-21

## 2022-07-08 RX ORDER — METHYLPREDNISOLONE SODIUM SUCCINATE 125 MG/2ML
60 INJECTION, POWDER, LYOPHILIZED, FOR SOLUTION INTRAMUSCULAR; INTRAVENOUS EVERY 8 HOURS
Status: DISCONTINUED | OUTPATIENT
Start: 2022-07-08 | End: 2022-07-12

## 2022-07-08 RX ORDER — BISACODYL 10 MG
10 SUPPOSITORY, RECTAL RECTAL
Status: DISCONTINUED | OUTPATIENT
Start: 2022-07-08 | End: 2022-07-17

## 2022-07-08 NOTE — ANESTHESIA PROCEDURE NOTES
Airway  Date/Time: 7/8/2022 6:30 PM  Urgency: emergent    Airway not difficult    General Information and Staff    Patient location during procedure: ICU  Anesthesiologist: Bella Frank MD  Performed: anesthesiologist     Consent for Airway (if performed for an anesthetic, see related documentation for consents)  Patient identity confirmed: verbally with patient  Consent: Verbal consent obtained.   Consent given by: patient      Indications and Patient Condition  Indications for airway management: respiratory distress  Spontaneous Ventilation: absent  Sedation level: deep  Preoxygenated: yes  Patient position: sniffing  Mask difficulty assessment: 0 - not attempted    Final Airway Details  Final airway type: endotracheal airway      Successful airway: ETT  Cuffed: yes   Successful intubation technique: Video laryngoscopy  Facilitating devices/methods: rapid sequence intubation  Endotracheal tube insertion site: oral  Blade: GlideScope  Blade size: #4  ETT size (mm): 8.0    Cormack-Lehane Classification: grade I - full view of glottis  Placement verified by: chest auscultation and capnometry   Measured from: lips  ETT to lips (cm): 24  Number of attempts at approach: 1    Additional Comments  Etomidate 16mg  sux 100mg

## 2022-07-08 NOTE — RESPIRATORY THERAPY NOTE
Patient in distress earlier. abg drawn placed on bipap. Checked on patient 1 hour later. Patient still is distress. Asked charge RN to call a rapid. Moved patient to ICU to be intubated per anesthesia placed on ventilator.   Xray called

## 2022-07-08 NOTE — PLAN OF CARE
See flowsheets. Axo x 4, pleasant. RA/BL currently on 4L NC. Productive cough. PRN robitussin. SOB with exertion. 5L with ambulation/exertion. See MAR. Afebrile. Tele. SR/ST. Continent. C/o of neck pain with relief from ice packs. Up at oskar. Regular diet. IV solumedrol. Albuterol inhaler. Updated patient on plan of care. Frequent roundings, needs met. Call light within reach. WCTM. VSS.      Problem: Patient/Family Goals  Goal: Patient/Family Long Term Goal  Description: Patient's Long Term Goal: Discharge home with proper resources    Interventions:  - Follow plan of care  - See additional Care Plan goals for specific interventions  Outcome: Progressing  Goal: Patient/Family Short Term Goal  Description: Patient's Short Term Goal:   7/6 AM: wean O2  7/7 ([address] [facility]): Able to sleep well, wean O2 as tolerated    Interventions:   -IS  -IV solumedrol  -O2 walk  -Cluster care  -Dim lighting  - See additional Care Plan goals for specific interventions  Outcome: Progressing     Problem: RESPIRATORY - ADULT  Goal: Achieves optimal ventilation and oxygenation  Description: INTERVENTIONS:  - Assess for changes in respiratory status  - Assess for changes in mentation and behavior  - Position to facilitate oxygenation and minimize respiratory effort  - Oxygen supplementation based on oxygen saturation or ABGs  - Provide Smoking Cessation handout, if applicable  - Encourage broncho-pulmonary hygiene including cough, deep breathe, Incentive Spirometry  - Assess the need for suctioning and perform as needed  - Assess and instruct to report SOB or any respiratory difficulty  - Respiratory Therapy support as indicated  - Manage/alleviate anxiety  - Monitor for signs/symptoms of CO2 retention  Outcome: Progressing

## 2022-07-08 NOTE — CM/SW NOTE
Contact Ruby Haro from [facility] [phone] to assist with discharge planning.     Shayan Rainey, MSN RN, CM

## 2022-07-08 NOTE — SIGNIFICANT EVENT
RN informed that RRT was called by RT. Hospitalist aware of patients status. On call MD placed transfer order to ICU, handoff given to charge RN.

## 2022-07-08 NOTE — PLAN OF CARE
Patient becoming increasingly SOB, MD notified and ordered neb treatment and Solumedrol. No improvements after interventions, MD notified patient is still tachypnic, tachycardic, diaphoretic, grunting and SOB. MD ordered 1 hour long neb treatment, Ativan 0.25 mg once and Tylenol 100 mg once. MD came to bedside to assess patient and ordered AVAPS, Lasix 60 mg, ABG's, BMP and CT of chest when pt stabilizes.

## 2022-07-09 ENCOUNTER — APPOINTMENT (OUTPATIENT)
Dept: GENERAL RADIOLOGY | Facility: HOSPITAL | Age: 62
End: 2022-07-09
Attending: NURSE PRACTITIONER
Payer: COMMERCIAL

## 2022-07-09 ENCOUNTER — APPOINTMENT (OUTPATIENT)
Dept: CT IMAGING | Facility: HOSPITAL | Age: 62
End: 2022-07-09
Attending: HOSPITALIST
Payer: COMMERCIAL

## 2022-07-09 LAB
ALBUMIN SERPL-MCNC: 2.7 G/DL (ref 3.4–5)
ALBUMIN/GLOB SERPL: 0.6 {RATIO} (ref 1–2)
ALP LIVER SERPL-CCNC: 53 U/L
ALT SERPL-CCNC: 16 U/L
ANION GAP SERPL CALC-SCNC: 10 MMOL/L (ref 0–18)
AST SERPL-CCNC: 10 U/L (ref 15–37)
BASOPHILS # BLD AUTO: 0.03 X10(3) UL (ref 0–0.2)
BASOPHILS NFR BLD AUTO: 0.5 %
BILIRUB SERPL-MCNC: 0.3 MG/DL (ref 0.1–2)
BUN BLD-MCNC: 42 MG/DL (ref 7–18)
CALCIUM BLD-MCNC: 8.9 MG/DL (ref 8.5–10.1)
CHLORIDE SERPL-SCNC: 105 MMOL/L (ref 98–112)
CO2 SERPL-SCNC: 25 MMOL/L (ref 21–32)
CREAT BLD-MCNC: 1.54 MG/DL
CREAT UR-SCNC: 213 MG/DL
CRP SERPL-MCNC: 10.2 MG/DL (ref ?–0.3)
EOSINOPHIL # BLD AUTO: 0 X10(3) UL (ref 0–0.7)
EOSINOPHIL NFR BLD AUTO: 0 %
ERYTHROCYTE [DISTWIDTH] IN BLOOD BY AUTOMATED COUNT: 14 %
GLOBULIN PLAS-MCNC: 4.2 G/DL (ref 2.8–4.4)
GLUCOSE BLD-MCNC: 145 MG/DL (ref 70–99)
GLUCOSE BLD-MCNC: 181 MG/DL (ref 70–99)
GLUCOSE BLD-MCNC: 182 MG/DL (ref 70–99)
GLUCOSE BLD-MCNC: 211 MG/DL (ref 70–99)
HCT VFR BLD AUTO: 41.6 %
HGB BLD-MCNC: 12.5 G/DL
HGB BLD-MCNC: 13 G/DL
IMM GRANULOCYTES # BLD AUTO: 0.04 X10(3) UL (ref 0–1)
IMM GRANULOCYTES NFR BLD: 0.6 %
LACTATE SERPL-SCNC: 2.6 MMOL/L (ref 0.4–2)
LACTATE SERPL-SCNC: 3 MMOL/L (ref 0.4–2)
LYMPHOCYTES # BLD AUTO: 1.06 X10(3) UL (ref 1–4)
LYMPHOCYTES NFR BLD AUTO: 16 %
MCH RBC QN AUTO: 28.3 PG (ref 26–34)
MCHC RBC AUTO-ENTMCNC: 31.3 G/DL (ref 31–37)
MCV RBC AUTO: 90.4 FL
MONOCYTES # BLD AUTO: 0.69 X10(3) UL (ref 0.1–1)
MONOCYTES NFR BLD AUTO: 10.4 %
NEUTROPHILS # BLD AUTO: 4.82 X10 (3) UL (ref 1.5–7.7)
NEUTROPHILS # BLD AUTO: 4.82 X10(3) UL (ref 1.5–7.7)
NEUTROPHILS NFR BLD AUTO: 72.5 %
NT-PROBNP SERPL-MCNC: 710 PG/ML (ref ?–125)
OSMOLALITY SERPL CALC.SUM OF ELEC: 307 MOSM/KG (ref 275–295)
PLATELET # BLD AUTO: 284 10(3)UL (ref 150–450)
POTASSIUM SERPL-SCNC: 3.7 MMOL/L (ref 3.5–5.1)
PROCALCITONIN SERPL-MCNC: 3.34 NG/ML (ref ?–0.16)
PROT SERPL-MCNC: 6.9 G/DL (ref 6.4–8.2)
RBC # BLD AUTO: 4.6 X10(6)UL
SODIUM SERPL-SCNC: 140 MMOL/L (ref 136–145)
SODIUM SERPL-SCNC: <5 MMOL/L
UUN UR-MCNC: 964 MG/DL
WBC # BLD AUTO: 6.6 X10(3) UL (ref 4–11)

## 2022-07-09 PROCEDURE — 71250 CT THORAX DX C-: CPT | Performed by: HOSPITALIST

## 2022-07-09 PROCEDURE — 99233 SBSQ HOSP IP/OBS HIGH 50: CPT | Performed by: HOSPITALIST

## 2022-07-09 PROCEDURE — 71045 X-RAY EXAM CHEST 1 VIEW: CPT | Performed by: NURSE PRACTITIONER

## 2022-07-09 RX ORDER — MIDAZOLAM HYDROCHLORIDE 1 MG/ML
2 INJECTION INTRAMUSCULAR; INTRAVENOUS EVERY 5 MIN PRN
Status: DISCONTINUED | OUTPATIENT
Start: 2022-07-09 | End: 2022-07-14

## 2022-07-09 RX ORDER — SODIUM BICARBONATE 325 MG/1
325 TABLET ORAL AS NEEDED
Status: DISCONTINUED | OUTPATIENT
Start: 2022-07-09 | End: 2022-07-17

## 2022-07-09 RX ORDER — GUAIFENESIN 400 MG/1
400 TABLET ORAL EVERY 6 HOURS SCHEDULED
Status: DISCONTINUED | OUTPATIENT
Start: 2022-07-09 | End: 2022-07-17

## 2022-07-09 RX ORDER — SODIUM CHLORIDE 9 MG/ML
INJECTION, SOLUTION INTRAVENOUS CONTINUOUS
Status: DISCONTINUED | OUTPATIENT
Start: 2022-07-09 | End: 2022-07-13

## 2022-07-09 RX ORDER — GUAIFENESIN 400 MG/1
400 TABLET ORAL EVERY 6 HOURS SCHEDULED
Status: DISCONTINUED | OUTPATIENT
Start: 2022-07-09 | End: 2022-07-09

## 2022-07-09 RX ORDER — ALBUTEROL SULFATE 2.5 MG/3ML
2.5 SOLUTION RESPIRATORY (INHALATION)
Status: DISCONTINUED | OUTPATIENT
Start: 2022-07-09 | End: 2022-07-16

## 2022-07-09 RX ORDER — POTASSIUM CHLORIDE 14.9 MG/ML
20 INJECTION INTRAVENOUS ONCE
Status: COMPLETED | OUTPATIENT
Start: 2022-07-09 | End: 2022-07-09

## 2022-07-09 NOTE — PLAN OF CARE
Received sedated on ventilator. Initially moving all extremities but does not follow commands. Able to follow some simple commands this am. Has periods of increased restlessness/agitation and fighting ventilator. Continued on precedex, propofol and fentanyl drip started to help keep patient calm and for ventilator compliance. Bilateral soft wrist restraints maintained as reached for ett. SR on the monitor. Levo titrated to keep sbp>90 and map>60. Og to lis with initial tan colored drainage then noted to be brown/red wih coffee grounds at 2200. CcAPn made aware- og irrigated and eliquis held. Murphy inserted per orders without difficulty with immediate return clear yellow urine.

## 2022-07-09 NOTE — PROGRESS NOTES
Notified by RN of patient having coffee ground and blood tinged drainage from NGT. He is on Eliquis for recent PE in June. Tonight's dose of Eliquis held--will plan to resume in AM if minimal output from NGT. PPI increased to BID dosing for now. Additionally, post intubation follow up ABG showed lactic acid elevation--trend ordered and currently at 2.5 (down from 2.8 on ABG). 500ml 0.9% NS bolus ordered--additional bolus withheld d/t prior concerns of CHF exacerbation, overload and elevated pBNP. Continuing to trend LA levels. PCT pending  RN reporting concern for aspiration post intubation as patient noted to have same drainage suctioned from ETT and NGT--Zosyn ordered for empiric coverage of aspiration.   [facility] on-call, Dr Milagro Coleman, updated with above    RACH Johnson  Critical Care NP  Lyly [phone]  Pgr: [phone]

## 2022-07-09 NOTE — PLAN OF CARE
Received patient after report. Patient resting in bed intubated on ventilator. Received patient on levo. Titrated off. Propofol, fentanyl, and precedex for sedation. Titrated per protocol. Precedex decreased d/t bradycardia. Episodes of agitation with coughing fits, pulling self up and sitting up in bed forcefully. HR increases with agitation. Bilateral soft wrist restraints remain comfortably in place for safety. Agitation episodes with breath stacking and noncompliance with vent. See PRNs. Large amounts of secretions coming up ETT. Suctioned PRN. Desat later in shift, RT at bedside. Patient able to nod yes/no and follow commands. CT scan completed without complication. OG with coffee ground output. Eliquis held per orders. Debate on initiating TF d/t output. Output slowed, per provider instruction, TF started later in shift. Murphy in place. Borderline/low UO with white-marcellus appearance. APN notified. Wife and daughter visiting at bedside. Updated on plan of care.

## 2022-07-10 ENCOUNTER — APPOINTMENT (OUTPATIENT)
Dept: GENERAL RADIOLOGY | Facility: HOSPITAL | Age: 62
End: 2022-07-10
Attending: HOSPITALIST
Payer: COMMERCIAL

## 2022-07-10 LAB
ANION GAP SERPL CALC-SCNC: 8 MMOL/L (ref 0–18)
ARTERIAL PATENCY WRIST A: POSITIVE
ARTERIAL PATENCY WRIST A: POSITIVE
BASE EXCESS BLDA CALC-SCNC: -0.5 MMOL/L (ref ?–2)
BASE EXCESS BLDA CALC-SCNC: -1.1 MMOL/L (ref ?–2)
BASOPHILS # BLD AUTO: 0.02 X10(3) UL (ref 0–0.2)
BASOPHILS NFR BLD AUTO: 0.2 %
BODY TEMPERATURE: 98.6 F
BODY TEMPERATURE: 98.6 F
BUN BLD-MCNC: 37 MG/DL (ref 7–18)
CA-I BLD-SCNC: 1.19 MMOL/L (ref 0.95–1.32)
CALCIUM BLD-MCNC: 8.4 MG/DL (ref 8.5–10.1)
CHLORIDE SERPL-SCNC: 111 MMOL/L (ref 98–112)
CO2 SERPL-SCNC: 24 MMOL/L (ref 21–32)
COHGB MFR BLD: 0.9 % SAT (ref 0–3)
COHGB MFR BLD: 1.3 % SAT (ref 0–3)
CREAT BLD-MCNC: 1.18 MG/DL
EOSINOPHIL # BLD AUTO: 0 X10(3) UL (ref 0–0.7)
EOSINOPHIL NFR BLD AUTO: 0 %
ERYTHROCYTE [DISTWIDTH] IN BLOOD BY AUTOMATED COUNT: 14.3 %
FIO2: 40 %
FIO2: 40 %
GLUCOSE BLD-MCNC: 159 MG/DL (ref 70–99)
GLUCOSE BLD-MCNC: 164 MG/DL (ref 70–99)
GLUCOSE BLD-MCNC: 171 MG/DL (ref 70–99)
GLUCOSE BLD-MCNC: 182 MG/DL (ref 70–99)
GLUCOSE BLD-MCNC: 201 MG/DL (ref 70–99)
HCO3 BLDA-SCNC: 24.1 MEQ/L (ref 21–27)
HCO3 BLDA-SCNC: 24.4 MEQ/L (ref 21–27)
HCT VFR BLD AUTO: 38 %
HGB BLD-MCNC: 11.9 G/DL
HGB BLD-MCNC: 12.4 G/DL
HGB BLD-MCNC: 12.5 G/DL
IMM GRANULOCYTES # BLD AUTO: 0.2 X10(3) UL (ref 0–1)
IMM GRANULOCYTES NFR BLD: 2.3 %
LACTATE BLD-SCNC: 1.5 MMOL/L (ref 0.5–2)
LACTATE SERPL-SCNC: 2 MMOL/L (ref 0.4–2)
LACTATE SERPL-SCNC: 2.1 MMOL/L (ref 0.4–2)
LACTATE SERPL-SCNC: 2.4 MMOL/L (ref 0.4–2)
LYMPHOCYTES # BLD AUTO: 1.19 X10(3) UL (ref 1–4)
LYMPHOCYTES NFR BLD AUTO: 13.4 %
MAGNESIUM SERPL-MCNC: 2.6 MG/DL (ref 1.6–2.6)
MCH RBC QN AUTO: 28.6 PG (ref 26–34)
MCHC RBC AUTO-ENTMCNC: 31.3 G/DL (ref 31–37)
MCV RBC AUTO: 91.3 FL
METHGB MFR BLD: 1.1 % SAT (ref 0.4–1.5)
METHGB MFR BLD: 1.2 % SAT (ref 0.4–1.5)
MONOCYTES # BLD AUTO: 0.75 X10(3) UL (ref 0.1–1)
MONOCYTES NFR BLD AUTO: 8.5 %
NEUTROPHILS # BLD AUTO: 6.7 X10 (3) UL (ref 1.5–7.7)
NEUTROPHILS # BLD AUTO: 6.7 X10(3) UL (ref 1.5–7.7)
NEUTROPHILS NFR BLD AUTO: 75.6 %
OSMOLALITY SERPL CALC.SUM OF ELEC: 310 MOSM/KG (ref 275–295)
OXYHGB MFR BLDA: 92.4 % (ref 92–100)
OXYHGB MFR BLDA: 97 % (ref 92–100)
PCO2 BLDA: 39 MM HG (ref 35–45)
PCO2 BLDA: 45 MM HG (ref 35–45)
PEEP: 5 CM H2O
PEEP: 5 CM H2O
PH BLDA: 7.35 [PH] (ref 7.35–7.45)
PH BLDA: 7.4 [PH] (ref 7.35–7.45)
PHOSPHATE SERPL-MCNC: 2.3 MG/DL (ref 2.5–4.9)
PLATELET # BLD AUTO: 259 10(3)UL (ref 150–450)
PO2 BLDA: 122 MM HG (ref 80–100)
PO2 BLDA: 68 MM HG (ref 80–100)
POTASSIUM BLD-SCNC: 3.8 MMOL/L (ref 3.6–5.1)
POTASSIUM SERPL-SCNC: 3.7 MMOL/L (ref 3.5–5.1)
POTASSIUM SERPL-SCNC: 3.7 MMOL/L (ref 3.5–5.1)
RBC # BLD AUTO: 4.16 X10(6)UL
SODIUM BLD-SCNC: 138 MMOL/L (ref 135–145)
SODIUM SERPL-SCNC: 143 MMOL/L (ref 136–145)
TIDAL VOLUME: 400 ML
TIDAL VOLUME: 500 ML
VENT RATE: 20 /MIN
VENT RATE: 26 /MIN
WBC # BLD AUTO: 8.9 X10(3) UL (ref 4–11)

## 2022-07-10 PROCEDURE — 71045 X-RAY EXAM CHEST 1 VIEW: CPT | Performed by: NURSE PRACTITIONER

## 2022-07-10 PROCEDURE — 99233 SBSQ HOSP IP/OBS HIGH 50: CPT | Performed by: HOSPITALIST

## 2022-07-10 PROCEDURE — 71045 X-RAY EXAM CHEST 1 VIEW: CPT | Performed by: HOSPITALIST

## 2022-07-10 RX ORDER — CISATRACURIUM BESYLATE 2 MG/ML
0.2 INJECTION INTRAVENOUS ONCE
Status: DISCONTINUED | OUTPATIENT
Start: 2022-07-10 | End: 2022-07-12

## 2022-07-10 NOTE — PLAN OF CARE
Received sedated on ventilator. Opens eyes to name, moves all extremities, follows some simple commands and nods appropriately. Has intermittent agitation/restless and dyssynchrony with the vent. Continues on precedex, propfol and fentanyl drips with prn medications also given. Soft wrist restraints continued. SR on the monitor. Normotensevive off of vasopressors. Murphy patent with improved urine output. Tolerating tube feedings as ordered.

## 2022-07-10 NOTE — RESPIRATORY THERAPY NOTE
Pt was asynchronous with the ventilator. Settings were changed to 26/400 from 20/500 keeping the average Minute Ventilation at 10 and the patient seems to be more comfortable. Follow-up ABG was drawn to confirm changes were appropriate.

## 2022-07-10 NOTE — PLAN OF CARE
Assumed care of patient intubated and sedated. Increased agitation and decreased vent compliance in afternoon- titrated sedation, APN to bedside, order for chest XR, decreased fluids and versed gtt. Thick sevilla inline secretions. NSR-SB on tele. Afebrile. Tolerating tube feeds, increasing to goal rate as ordered. Murphy with nery urine output. Plan for bronch at midnight. Wife at bedside, updated on plan of care.

## 2022-07-11 LAB
ALBUMIN SERPL-MCNC: 2.2 G/DL (ref 3.4–5)
ALBUMIN/GLOB SERPL: 0.6 {RATIO} (ref 1–2)
ALP LIVER SERPL-CCNC: 46 U/L
ALT SERPL-CCNC: 20 U/L
ANION GAP SERPL CALC-SCNC: 4 MMOL/L (ref 0–18)
ARTERIAL PATENCY WRIST A: POSITIVE
AST SERPL-CCNC: 18 U/L (ref 15–37)
BASE EXCESS BLDA CALC-SCNC: -0.1 MMOL/L (ref ?–2)
BASOPHILS NFR BRONCH: 0 %
BILIRUB SERPL-MCNC: 0.2 MG/DL (ref 0.1–2)
BODY TEMPERATURE: 98.6 F
BUN BLD-MCNC: 36 MG/DL (ref 7–18)
CA-I BLD-SCNC: 1.15 MMOL/L (ref 0.95–1.32)
CALCIUM BLD-MCNC: 8.1 MG/DL (ref 8.5–10.1)
CHLORIDE SERPL-SCNC: 115 MMOL/L (ref 98–112)
CO2 SERPL-SCNC: 26 MMOL/L (ref 21–32)
COHGB MFR BLD: 0.9 % SAT (ref 0–3)
CREAT BLD-MCNC: 1.29 MG/DL
EOSINOPHIL NFR BRONCH: 0 %
ERYTHROCYTE [DISTWIDTH] IN BLOOD BY AUTOMATED COUNT: 14.6 %
FIO2: 50 %
GLOBULIN PLAS-MCNC: 3.9 G/DL (ref 2.8–4.4)
GLUCOSE BLD-MCNC: 141 MG/DL (ref 70–99)
GLUCOSE BLD-MCNC: 163 MG/DL (ref 70–99)
GLUCOSE BLD-MCNC: 163 MG/DL (ref 70–99)
GLUCOSE BLD-MCNC: 165 MG/DL (ref 70–99)
GLUCOSE BLD-MCNC: 182 MG/DL (ref 70–99)
HCO3 BLDA-SCNC: 24.8 MEQ/L (ref 21–27)
HCT VFR BLD AUTO: 38 %
HGB BLD-MCNC: 11.5 G/DL
HGB BLD-MCNC: 11.6 G/DL
LACTATE BLD-SCNC: 1.7 MMOL/L (ref 0.5–2)
LYMPHOCYTES NFR BRONCH: 5 %
MCH RBC QN AUTO: 28.5 PG (ref 26–34)
MCHC RBC AUTO-ENTMCNC: 30.3 G/DL (ref 31–37)
MCV RBC AUTO: 94.1 FL
METHGB MFR BLD: 1.2 % SAT (ref 0.4–1.5)
MONOS+MACROS NFR BRONCH: 1 %
NEUTROPHILS NFR BRONCH: 94 %
OSMOLALITY SERPL CALC.SUM OF ELEC: 313 MOSM/KG (ref 275–295)
OXYHGB MFR BLDA: 96.6 % (ref 92–100)
PCO2 BLDA: 47 MM HG (ref 35–45)
PEEP: 5 CM H2O
PH BLDA: 7.35 [PH] (ref 7.35–7.45)
PHOSPHATE SERPL-MCNC: 2.9 MG/DL (ref 2.5–4.9)
PLATELET # BLD AUTO: 261 10(3)UL (ref 150–450)
PO2 BLDA: 110 MM HG (ref 80–100)
POTASSIUM BLD-SCNC: 3.9 MMOL/L (ref 3.6–5.1)
POTASSIUM SERPL-SCNC: 3.9 MMOL/L (ref 3.5–5.1)
POTASSIUM SERPL-SCNC: 3.9 MMOL/L (ref 3.5–5.1)
PROT SERPL-MCNC: 6.1 G/DL (ref 6.4–8.2)
RBC # BLD AUTO: 4.04 X10(6)UL
RBC BRONCH FOR MAN CT: 8500 /MM3
SARS-COV-2 RNA RESP QL NAA+PROBE: NOT DETECTED
SODIUM BLD-SCNC: 140 MMOL/L (ref 135–145)
SODIUM SERPL-SCNC: 145 MMOL/L (ref 136–145)
TIDAL VOLUME: 550 ML
TOTAL CELLS COUNTED BRONCH: 1467 /MM3
TOTAL CELLS COUNTED FLD: 100
VENT RATE: 14 /MIN
WBC # BLD AUTO: 10.6 X10(3) UL (ref 4–11)

## 2022-07-11 PROCEDURE — 0B9F8ZX DRAINAGE OF RIGHT LOWER LUNG LOBE, VIA NATURAL OR ARTIFICIAL OPENING ENDOSCOPIC, DIAGNOSTIC: ICD-10-PCS | Performed by: INTERNAL MEDICINE

## 2022-07-11 NOTE — PROCEDURES
Bronchoscopy procedure report    Preop diagnosis: Pneumonia   Postop diagnosis:  Pneumonia   Procedure performed: Bronchoalveolar lavage, BAL    Sedation used: 2 mg of versed, 50 mcg of fentanyl    Description of procedure: Informed consent was obtained. Bronchoscope was inserted via ETT. Trachea appeared normal. Tracy appeared sharp. Mucous membranes appeared edematous and easily friable. There were thick white secretions bilaterally. BAL was performed in the RLL (100cc in, 25 out). Tolerated procedure well. EBL 0 ml.      Tony Meza MD   Pulmonary and [address] [place] [address]

## 2022-07-11 NOTE — RESPIRATORY THERAPY NOTE
RT received pt on vent settings: vc+ 14/550/+5/50%. ETT 26 @ lip. Pt has large amount of thick white sputum pt positive with gag and cough. Pt has had increased peak pressures in the mid to upper 30's. Bilat lung sound diminished with rhonchi and occasional expiratory wheezes. Pt was bronched this afternoon RT decreased FIO2 to 45%. RT will continue to monitor.

## 2022-07-11 NOTE — PLAN OF CARE
Received patient intubated and sedated on Fentanyl, Propofol, Versed, Precedex. Sedation titrated based on ventilator compliance. Vent settings peep +5, Rate 14,  FiO2 50%. Aynchronous with vent, patient not receiving volumes, agonal breathing. APRN notified, STAT Chest XRAY, ventilator settings changed. Temporarily on levo for hypotension during ventilator asynchrony. Afebrile. NSR. OG to continuous tube feeds. NPO @0000 for scheduled Bronch in AM. Murphy with good urine output. No BM last night. See MAR, See Flowsheets for additional information.

## 2022-07-11 NOTE — PLAN OF CARE
Received patient after report. Patient resting in bed intubated on ventilator. CPOT negative. Family visiting at bedside. VSS. Patient remains on fentanyl, propofol, precedex, and versed for sedation. PRN boluses given with vent asynchrony as directed by physician. Physician at bedside performing bronchoscopy. Murphy remains in place. OG clamped prior to procedure. TF restarted after procedure as directed. Wife and daughter visiting at bedside. Updated on plan of care.

## 2022-07-11 NOTE — RESPIRATORY THERAPY NOTE
Pt became very asynchronous with the ventilator after being suctioned- breath holding, then breath stacking. Nebulizer was given and vent settings changed from 24/400 to 14/550. Follow  Up ABG drawn and confirmed settings are appropriate.

## 2022-07-12 ENCOUNTER — APPOINTMENT (OUTPATIENT)
Dept: GENERAL RADIOLOGY | Facility: HOSPITAL | Age: 62
End: 2022-07-12
Attending: NURSE PRACTITIONER
Payer: COMMERCIAL

## 2022-07-12 LAB
ANION GAP SERPL CALC-SCNC: 4 MMOL/L (ref 0–18)
APTT PPP: 24.3 SECONDS (ref 23.3–35.6)
ARTERIAL PATENCY WRIST A: POSITIVE
ATRIAL RATE: 113 BPM
BASE EXCESS BLDA CALC-SCNC: 1.2 MMOL/L (ref ?–2)
BASOPHILS # BLD: 0 X10(3) UL (ref 0–0.2)
BASOPHILS NFR BLD: 0 %
BODY TEMPERATURE: 98.6 F
BUN BLD-MCNC: 34 MG/DL (ref 7–18)
CALCIUM BLD-MCNC: 8 MG/DL (ref 8.5–10.1)
CHLORIDE SERPL-SCNC: 116 MMOL/L (ref 98–112)
CO2 SERPL-SCNC: 26 MMOL/L (ref 21–32)
COHGB MFR BLD: 0.8 % SAT (ref 0–3)
CREAT BLD-MCNC: 0.91 MG/DL
EOSINOPHIL # BLD: 0 X10(3) UL (ref 0–0.7)
EOSINOPHIL NFR BLD: 0 %
ERYTHROCYTE [DISTWIDTH] IN BLOOD BY AUTOMATED COUNT: 15.1 %
EST. AVERAGE GLUCOSE BLD GHB EST-MCNC: 148 MG/DL (ref 68–126)
FIO2: 40 %
GLUCOSE BLD-MCNC: 138 MG/DL (ref 70–99)
GLUCOSE BLD-MCNC: 174 MG/DL (ref 70–99)
GLUCOSE BLD-MCNC: 183 MG/DL (ref 70–99)
GLUCOSE BLD-MCNC: 203 MG/DL (ref 70–99)
GLUCOSE BLD-MCNC: 212 MG/DL (ref 70–99)
HBA1C MFR BLD: 6.8 % (ref ?–5.7)
HCO3 BLDA-SCNC: 25.8 MEQ/L (ref 21–27)
HCT VFR BLD AUTO: 38.8 %
HGB BLD-MCNC: 11.7 G/DL
HGB BLD-MCNC: 12.4 G/DL
LYMPHOCYTES NFR BLD: 1.25 X10(3) UL (ref 1–4)
LYMPHOCYTES NFR BLD: 13 %
MAGNESIUM SERPL-MCNC: 3 MG/DL (ref 1.6–2.6)
MCH RBC QN AUTO: 28.6 PG (ref 26–34)
MCHC RBC AUTO-ENTMCNC: 30.2 G/DL (ref 31–37)
MCV RBC AUTO: 94.9 FL
METHGB MFR BLD: 1.1 % SAT (ref 0.4–1.5)
MONOCYTES # BLD: 0.48 X10(3) UL (ref 0.1–1)
MONOCYTES NFR BLD: 5 %
MORPHOLOGY: NORMAL
NEUTROPHILS # BLD AUTO: 7 X10 (3) UL (ref 1.5–7.7)
NEUTROPHILS NFR BLD: 80 %
NEUTS BAND NFR BLD: 2 %
NEUTS HYPERSEG # BLD: 7.87 X10(3) UL (ref 1.5–7.7)
OSMOLALITY SERPL CALC.SUM OF ELEC: 316 MOSM/KG (ref 275–295)
OXYHGB MFR BLDA: 95.1 % (ref 92–100)
P/F RATIO: 210 MMHG
PCO2 BLDA: 55 MM HG (ref 35–45)
PEEP: 5 CM H2O
PH BLDA: 7.32 [PH] (ref 7.35–7.45)
PHOSPHATE SERPL-MCNC: 2.3 MG/DL (ref 2.5–4.9)
PLATELET # BLD AUTO: 248 10(3)UL (ref 150–450)
PLATELET MORPHOLOGY: NORMAL
PO2 BLDA: 84 MM HG (ref 80–100)
POTASSIUM SERPL-SCNC: 4.4 MMOL/L (ref 3.5–5.1)
Q-T INTERVAL: 284 MS
QRS DURATION: 80 MS
QTC CALCULATION (BEZET): 438 MS
R AXIS: 43 DEGREES
RBC # BLD AUTO: 4.09 X10(6)UL
SODIUM SERPL-SCNC: 146 MMOL/L (ref 136–145)
T AXIS: 80 DEGREES
TIDAL VOLUME: 550 ML
TOTAL CELLS COUNTED BLD: 100
VENT RATE: 14 /MIN
VENTRICULAR RATE: 143 BPM
WBC # BLD AUTO: 9.6 X10(3) UL (ref 4–11)

## 2022-07-12 PROCEDURE — 99231 SBSQ HOSP IP/OBS SF/LOW 25: CPT | Performed by: HOSPITALIST

## 2022-07-12 PROCEDURE — 71045 X-RAY EXAM CHEST 1 VIEW: CPT | Performed by: NURSE PRACTITIONER

## 2022-07-12 RX ORDER — HEPARIN SODIUM AND DEXTROSE 10000; 5 [USP'U]/100ML; G/100ML
1000 INJECTION INTRAVENOUS ONCE
Status: COMPLETED | OUTPATIENT
Start: 2022-07-12 | End: 2022-07-12

## 2022-07-12 RX ORDER — METOPROLOL TARTRATE 5 MG/5ML
5 INJECTION INTRAVENOUS EVERY 6 HOURS
Status: DISCONTINUED | OUTPATIENT
Start: 2022-07-12 | End: 2022-07-16

## 2022-07-12 RX ORDER — AMIODARONE HYDROCHLORIDE 50 MG/ML
150 INJECTION, SOLUTION INTRAVENOUS ONCE
Status: DISCONTINUED | OUTPATIENT
Start: 2022-07-12 | End: 2022-07-12

## 2022-07-12 RX ORDER — METOPROLOL TARTRATE 5 MG/5ML
5 INJECTION INTRAVENOUS ONCE
Status: COMPLETED | OUTPATIENT
Start: 2022-07-12 | End: 2022-07-12

## 2022-07-12 RX ORDER — NICOTINE POLACRILEX 4 MG
15 LOZENGE BUCCAL
Status: DISCONTINUED | OUTPATIENT
Start: 2022-07-12 | End: 2022-07-21

## 2022-07-12 RX ORDER — METHYLPREDNISOLONE SODIUM SUCCINATE 40 MG/ML
40 INJECTION, POWDER, LYOPHILIZED, FOR SOLUTION INTRAMUSCULAR; INTRAVENOUS EVERY 12 HOURS
Status: DISCONTINUED | OUTPATIENT
Start: 2022-07-12 | End: 2022-07-14

## 2022-07-12 RX ORDER — FUROSEMIDE 10 MG/ML
20 INJECTION INTRAMUSCULAR; INTRAVENOUS ONCE
Status: COMPLETED | OUTPATIENT
Start: 2022-07-12 | End: 2022-07-12

## 2022-07-12 RX ORDER — HEPARIN SODIUM AND DEXTROSE 10000; 5 [USP'U]/100ML; G/100ML
INJECTION INTRAVENOUS CONTINUOUS
Status: DISCONTINUED | OUTPATIENT
Start: 2022-07-12 | End: 2022-07-17

## 2022-07-12 RX ORDER — DEXTROSE MONOHYDRATE 25 G/50ML
50 INJECTION, SOLUTION INTRAVENOUS
Status: DISCONTINUED | OUTPATIENT
Start: 2022-07-12 | End: 2022-07-21

## 2022-07-12 RX ORDER — METOPROLOL TARTRATE 5 MG/5ML
INJECTION INTRAVENOUS
Status: COMPLETED
Start: 2022-07-12 | End: 2022-07-12

## 2022-07-12 RX ORDER — HEPARIN SODIUM 1000 [USP'U]/ML
5000 INJECTION, SOLUTION INTRAVENOUS; SUBCUTANEOUS ONCE
Status: COMPLETED | OUTPATIENT
Start: 2022-07-12 | End: 2022-07-12

## 2022-07-12 RX ORDER — NICOTINE POLACRILEX 4 MG
30 LOZENGE BUCCAL
Status: DISCONTINUED | OUTPATIENT
Start: 2022-07-12 | End: 2022-07-21

## 2022-07-12 NOTE — PLAN OF CARE
Received patient after report. Patient resting in bed intubated on ventilator. Received patient on fentanyl, precedex, propofol, and versed. Breathing trial in morning, sedation weaned and paused. Near end of breathing trial, patient HR increased into afib RVR. Back to full vent support and sedation restarted. EKG obtained. See orders for treatment of RVR. Cardiology consulted. Back to  later in shift. Heparin gtt initiated per orders. Bilateral soft wrist restraints remain in place. Lasix given per orders. Output monitored. Family visiting at bedside. Updated on plan of care.

## 2022-07-12 NOTE — CM/SW NOTE
07/12/22 0800   CM/SW Referral Data   Referral Source    Reason for Referral Discharge planning;Readmission   Informant EMR   Pertinent Medical Hx   Does patient have an established PCP? Yes   [address]   Patient lives with Spouse/Significant other   Patient Status Prior to Admission   Services in place prior to admission DME/Supplies at home   Type of DME/Supplies Home Oxygen  Sule[facility] Conway)   Discharge Needs   Anticipated D/C needs To be determined     Patient had home o2 setup in May 2022. Sw/cm will continue to follow for dc needs. Contact Shawn Vallejo from [facility] [phone] to assist with discharge planning.     Frankey Buggy, RN,   [id number]

## 2022-07-12 NOTE — RESPIRATORY THERAPY NOTE
Pt placed on SBT ps 5/5 40% and lasted for 60mins. Pt HR while in room went up to 171 and sustained. Pt placed back on full vent support at this time will continue to monitor.

## 2022-07-13 LAB
ALBUMIN SERPL-MCNC: 1.9 G/DL (ref 3.4–5)
ALBUMIN/GLOB SERPL: 0.5 {RATIO} (ref 1–2)
ALP LIVER SERPL-CCNC: 45 U/L
ALT SERPL-CCNC: 101 U/L
ANION GAP SERPL CALC-SCNC: 4 MMOL/L (ref 0–18)
APTT PPP: 24 SECONDS (ref 23.3–35.6)
APTT PPP: 25.8 SECONDS (ref 23.3–35.6)
APTT PPP: 48.8 SECONDS (ref 23.3–35.6)
APTT PPP: 56.1 SECONDS (ref 23.3–35.6)
AST SERPL-CCNC: 30 U/L (ref 15–37)
BASOPHILS # BLD: 0 X10(3) UL (ref 0–0.2)
BASOPHILS NFR BLD: 0 %
BILIRUB SERPL-MCNC: 0.4 MG/DL (ref 0.1–2)
BUN BLD-MCNC: 33 MG/DL (ref 7–18)
C DIFF TOX B STL QL: POSITIVE
CALCIUM BLD-MCNC: 7.9 MG/DL (ref 8.5–10.1)
CHLORIDE SERPL-SCNC: 113 MMOL/L (ref 98–112)
CO2 SERPL-SCNC: 29 MMOL/L (ref 21–32)
CREAT BLD-MCNC: 0.68 MG/DL
EOSINOPHIL # BLD: 0 X10(3) UL (ref 0–0.7)
EOSINOPHIL NFR BLD: 0 %
ERYTHROCYTE [DISTWIDTH] IN BLOOD BY AUTOMATED COUNT: 14.6 %
GLOBULIN PLAS-MCNC: 3.6 G/DL (ref 2.8–4.4)
GLUCOSE BLD-MCNC: 152 MG/DL (ref 70–99)
GLUCOSE BLD-MCNC: 155 MG/DL (ref 70–99)
GLUCOSE BLD-MCNC: 197 MG/DL (ref 70–99)
GLUCOSE BLD-MCNC: 197 MG/DL (ref 70–99)
HCT VFR BLD AUTO: 39.7 %
HGB BLD-MCNC: 12.3 G/DL
LYMPHOCYTES NFR BLD: 1.38 X10(3) UL (ref 1–4)
LYMPHOCYTES NFR BLD: 12 %
MAGNESIUM SERPL-MCNC: 2.9 MG/DL (ref 1.6–2.6)
MCH RBC QN AUTO: 28.3 PG (ref 26–34)
MCHC RBC AUTO-ENTMCNC: 31 G/DL (ref 31–37)
MCV RBC AUTO: 91.5 FL
METAMYELOCYTES # BLD: 0.12 X10(3) UL
METAMYELOCYTES NFR BLD: 1 %
MONOCYTES # BLD: 0.35 X10(3) UL (ref 0.1–1)
MONOCYTES NFR BLD: 3 %
MORPHOLOGY: NORMAL
NEUTROPHILS # BLD AUTO: 8.3 X10 (3) UL (ref 1.5–7.7)
NEUTROPHILS NFR BLD: 81 %
NEUTS BAND NFR BLD: 3 %
NEUTS HYPERSEG # BLD: 9.66 X10(3) UL (ref 1.5–7.7)
NT-PROBNP SERPL-MCNC: 572 PG/ML (ref ?–125)
OSMOLALITY SERPL CALC.SUM OF ELEC: 315 MOSM/KG (ref 275–295)
PHOSPHATE SERPL-MCNC: 2 MG/DL (ref 2.5–4.9)
PLATELET # BLD AUTO: 268 10(3)UL (ref 150–450)
PLATELET MORPHOLOGY: NORMAL
POTASSIUM SERPL-SCNC: 4.2 MMOL/L (ref 3.5–5.1)
PROT SERPL-MCNC: 5.5 G/DL (ref 6.4–8.2)
RBC # BLD AUTO: 4.34 X10(6)UL
SODIUM SERPL-SCNC: 146 MMOL/L (ref 136–145)
TOTAL CELLS COUNTED BLD: 100
TRIGL SERPL-MCNC: 108 MG/DL (ref 30–149)
WBC # BLD AUTO: 11.5 X10(3) UL (ref 4–11)

## 2022-07-13 PROCEDURE — 99233 SBSQ HOSP IP/OBS HIGH 50: CPT | Performed by: HOSPITALIST

## 2022-07-13 RX ORDER — HEPARIN SODIUM 1000 [USP'U]/ML
30 INJECTION, SOLUTION INTRAVENOUS; SUBCUTANEOUS ONCE
Status: COMPLETED | OUTPATIENT
Start: 2022-07-13 | End: 2022-07-13

## 2022-07-13 RX ORDER — HEPARIN SODIUM 1000 [USP'U]/ML
INJECTION, SOLUTION INTRAVENOUS; SUBCUTANEOUS
Status: COMPLETED
Start: 2022-07-13 | End: 2022-07-13

## 2022-07-13 RX ORDER — METOPROLOL TARTRATE 5 MG/5ML
5 INJECTION INTRAVENOUS ONCE
Status: COMPLETED | OUTPATIENT
Start: 2022-07-13 | End: 2022-07-13

## 2022-07-13 NOTE — PLAN OF CARE
Assumed care of patient at shift change. Patient intubated and sedated on propofol and precedex gtts. Fentanyl gtt restarted. OG tube in place with continuous tube feeds running at goal rate. Unable to keep patient clean due to multiple large, lose BMs. Attempted external rectal pouch without success. Flexiceal placed per order from [employer]. Stool sample sent - Cdiff (+). Murphy intact. Heparin gtt titrated per protocol.

## 2022-07-13 NOTE — PLAN OF CARE
Received pt sedated with propofol/precedex/fentanyl on ventilator with heparin and 0.9 infusing. Opens eyes to speech. Able to follow commands. Nods appropriately. Sedation weaned and fentanyl gtt stopped per order for pressure support trial, tolerated for about 1 hour. Pt noted to be tachypneic, asynchronous with ventilator, agitated, shaking head back and forth; placed back on full ventilator support. Precedex titrated per order. Pt nods \"yes\" to pain and becomes agitated and asynchronous with ventilator at times; PRN fentanyl given per order. Fentanyl gtt restarted. IVF discontinued and 0.9 bolus given per order. Heparin gtt titrated per protocol. OG intact. TF at goal. Murphy removed per order. Bladder scan and straight cath per protocol. Flexiseal intact, monitoring output. Afebrile. VSS. NSR on monitor. Family at bedside and updated on plan of care. See flowsheets. See MAR. Will continue to monitor.

## 2022-07-14 ENCOUNTER — APPOINTMENT (OUTPATIENT)
Dept: HEMATOLOGY/ONCOLOGY | Facility: HOSPITAL | Age: 62
End: 2022-07-14
Attending: INTERNAL MEDICINE
Payer: COMMERCIAL

## 2022-07-14 ENCOUNTER — APPOINTMENT (OUTPATIENT)
Dept: GENERAL RADIOLOGY | Facility: HOSPITAL | Age: 62
End: 2022-07-14
Attending: INTERNAL MEDICINE
Payer: COMMERCIAL

## 2022-07-14 LAB
ALBUMIN SERPL-MCNC: 1.9 G/DL (ref 3.4–5)
ALBUMIN/GLOB SERPL: 0.5 {RATIO} (ref 1–2)
ALP LIVER SERPL-CCNC: 47 U/L
ALT SERPL-CCNC: 68 U/L
ANION GAP SERPL CALC-SCNC: 3 MMOL/L (ref 0–18)
APTT PPP: 29.2 SECONDS (ref 23.3–35.6)
APTT PPP: 36.7 SECONDS (ref 23.3–35.6)
APTT PPP: 96.2 SECONDS (ref 23.3–35.6)
ARTERIAL PATENCY WRIST A: POSITIVE
AST SERPL-CCNC: 18 U/L (ref 15–37)
BASE EXCESS BLDA CALC-SCNC: 6.4 MMOL/L (ref ?–2)
BASOPHILS # BLD: 0 X10(3) UL (ref 0–0.2)
BASOPHILS NFR BLD: 0 %
BILIRUB SERPL-MCNC: 0.3 MG/DL (ref 0.1–2)
BODY TEMPERATURE: 98.6 F
BUN BLD-MCNC: 27 MG/DL (ref 7–18)
CALCIUM BLD-MCNC: 8 MG/DL (ref 8.5–10.1)
CHLORIDE SERPL-SCNC: 109 MMOL/L (ref 98–112)
CO2 SERPL-SCNC: 29 MMOL/L (ref 21–32)
COHGB MFR BLD: 1.3 % SAT (ref 0–3)
CREAT BLD-MCNC: 0.65 MG/DL
EOSINOPHIL # BLD: 0 X10(3) UL (ref 0–0.7)
EOSINOPHIL NFR BLD: 0 %
ERYTHROCYTE [DISTWIDTH] IN BLOOD BY AUTOMATED COUNT: 14.4 %
FIO2: 45 %
GLOBULIN PLAS-MCNC: 3.5 G/DL (ref 2.8–4.4)
GLUCOSE BLD-MCNC: 117 MG/DL (ref 70–99)
GLUCOSE BLD-MCNC: 121 MG/DL (ref 70–99)
GLUCOSE BLD-MCNC: 124 MG/DL (ref 70–99)
GLUCOSE BLD-MCNC: 151 MG/DL (ref 70–99)
GLUCOSE BLD-MCNC: 154 MG/DL (ref 70–99)
GLUCOSE BLD-MCNC: 161 MG/DL (ref 70–99)
HCO3 BLDA-SCNC: 29.9 MEQ/L (ref 21–27)
HCT VFR BLD AUTO: 39.4 %
HGB BLD-MCNC: 12.7 G/DL
HGB BLD-MCNC: 13.3 G/DL
LYMPHOCYTES NFR BLD: 14 %
LYMPHOCYTES NFR BLD: 2 X10(3) UL (ref 1–4)
MAGNESIUM SERPL-MCNC: 2.8 MG/DL (ref 1.6–2.6)
MCH RBC QN AUTO: 29 PG (ref 26–34)
MCHC RBC AUTO-ENTMCNC: 32.2 G/DL (ref 31–37)
MCV RBC AUTO: 90 FL
METHGB MFR BLD: 0.9 % SAT (ref 0.4–1.5)
MONOCYTES # BLD: 0.57 X10(3) UL (ref 0.1–1)
MONOCYTES NFR BLD: 4 %
MORPHOLOGY: NORMAL
MYELOCYTES # BLD: 0.14 X10(3) UL
MYELOCYTES NFR BLD: 1 %
NEUTROPHILS # BLD AUTO: 9.96 X10 (3) UL (ref 1.5–7.7)
NEUTROPHILS NFR BLD: 80 %
NEUTS BAND NFR BLD: 1 %
NEUTS HYPERSEG # BLD: 11.58 X10(3) UL (ref 1.5–7.7)
NON GYNE INTERPRETATION: NEGATIVE
OSMOLALITY SERPL CALC.SUM OF ELEC: 300 MOSM/KG (ref 275–295)
OXYHGB MFR BLDA: 95.8 % (ref 92–100)
P/F RATIO: 189 MMHG
PCO2 BLDA: 41 MM HG (ref 35–45)
PEEP: 5 CM H2O
PH BLDA: 7.48 [PH] (ref 7.35–7.45)
PHOSPHATE SERPL-MCNC: 2.6 MG/DL (ref 2.5–4.9)
PLATELET # BLD AUTO: 270 10(3)UL (ref 150–450)
PLATELET MORPHOLOGY: NORMAL
PO2 BLDA: 85 MM HG (ref 80–100)
POTASSIUM SERPL-SCNC: 4.3 MMOL/L (ref 3.5–5.1)
PROT SERPL-MCNC: 5.4 G/DL (ref 6.4–8.2)
RBC # BLD AUTO: 4.38 X10(6)UL
SODIUM SERPL-SCNC: 141 MMOL/L (ref 136–145)
TIDAL VOLUME: 550 ML
TOTAL CELLS COUNTED BLD: 100
VENT RATE: 14 /MIN
WBC # BLD AUTO: 14.3 X10(3) UL (ref 4–11)

## 2022-07-14 PROCEDURE — 71045 X-RAY EXAM CHEST 1 VIEW: CPT | Performed by: INTERNAL MEDICINE

## 2022-07-14 PROCEDURE — 5A09357 ASSISTANCE WITH RESPIRATORY VENTILATION, LESS THAN 24 CONSECUTIVE HOURS, CONTINUOUS POSITIVE AIRWAY PRESSURE: ICD-10-PCS | Performed by: HOSPITALIST

## 2022-07-14 RX ORDER — METOPROLOL TARTRATE 5 MG/5ML
INJECTION INTRAVENOUS
Status: COMPLETED
Start: 2022-07-14 | End: 2022-07-14

## 2022-07-14 RX ORDER — FUROSEMIDE 10 MG/ML
INJECTION INTRAMUSCULAR; INTRAVENOUS
Status: COMPLETED
Start: 2022-07-14 | End: 2022-07-14

## 2022-07-14 RX ORDER — HYDRALAZINE HYDROCHLORIDE 20 MG/ML
10 INJECTION INTRAMUSCULAR; INTRAVENOUS ONCE
Status: COMPLETED | OUTPATIENT
Start: 2022-07-14 | End: 2022-07-14

## 2022-07-14 RX ORDER — METRONIDAZOLE 500 MG/100ML
500 INJECTION, SOLUTION INTRAVENOUS EVERY 8 HOURS
Status: COMPLETED | OUTPATIENT
Start: 2022-07-15 | End: 2022-07-15

## 2022-07-14 RX ORDER — HEPARIN SODIUM 1000 [USP'U]/ML
30 INJECTION, SOLUTION INTRAVENOUS; SUBCUTANEOUS ONCE
Status: COMPLETED | OUTPATIENT
Start: 2022-07-14 | End: 2022-07-14

## 2022-07-14 RX ORDER — FUROSEMIDE 10 MG/ML
40 INJECTION INTRAMUSCULAR; INTRAVENOUS ONCE
Status: COMPLETED | OUTPATIENT
Start: 2022-07-14 | End: 2022-07-14

## 2022-07-14 RX ORDER — HYDRALAZINE HYDROCHLORIDE 20 MG/ML
10 INJECTION INTRAMUSCULAR; INTRAVENOUS EVERY 4 HOURS PRN
Status: DISCONTINUED | OUTPATIENT
Start: 2022-07-14 | End: 2022-07-21

## 2022-07-14 RX ORDER — HEPARIN SODIUM 1000 [USP'U]/ML
INJECTION, SOLUTION INTRAVENOUS; SUBCUTANEOUS
Status: DISCONTINUED
Start: 2022-07-14 | End: 2022-07-14 | Stop reason: ALTCHOICE

## 2022-07-14 RX ORDER — METOPROLOL TARTRATE 5 MG/5ML
5 INJECTION INTRAVENOUS ONCE
Status: COMPLETED | OUTPATIENT
Start: 2022-07-14 | End: 2022-07-14

## 2022-07-14 RX ORDER — PEPPERMINT OIL
20 OIL (ML) MISCELLANEOUS AS NEEDED
Status: DISCONTINUED | OUTPATIENT
Start: 2022-07-14 | End: 2022-07-17

## 2022-07-14 RX ORDER — SCOLOPAMINE TRANSDERMAL SYSTEM 1 MG/1
1 PATCH, EXTENDED RELEASE TRANSDERMAL
Status: DISCONTINUED | OUTPATIENT
Start: 2022-07-14 | End: 2022-07-16

## 2022-07-14 RX ORDER — METHYLPREDNISOLONE SODIUM SUCCINATE 40 MG/ML
40 INJECTION, POWDER, LYOPHILIZED, FOR SOLUTION INTRAMUSCULAR; INTRAVENOUS DAILY
Status: DISCONTINUED | OUTPATIENT
Start: 2022-07-15 | End: 2022-07-16

## 2022-07-14 NOTE — CM/SW NOTE
Pt extubated today but requiring bipap support. PT/OT evals when appropriate.     Jarrod Birmingham LCSW  /Discharge Planner  [phone]

## 2022-07-14 NOTE — SLP NOTE
SLP order received to evaluate oropharyngeal swallow following extubation. However, patient requiring BiPAP support at this time as not appropriate for PO trials. SLP will continue to monitor and evaluate when medically appropriate.

## 2022-07-14 NOTE — PLAN OF CARE
Assumed care of patient at shift change. Patient intubated and sedated with fentanyl, propofol and precedex gtts. Agitated at times, gtts titrated for patient comfort, see MAR. Follows commands, nods appropriately. OG tube intact with tube feedings running at goal rate. Heparin gtt per protocol. Rectal tube intact. Bladder scan/straight cath per urinary retention protocol.

## 2022-07-15 LAB
ALBUMIN SERPL-MCNC: 1.9 G/DL (ref 3.4–5)
ALBUMIN/GLOB SERPL: 0.5 {RATIO} (ref 1–2)
ALP LIVER SERPL-CCNC: 46 U/L
ALT SERPL-CCNC: 58 U/L
ANION GAP SERPL CALC-SCNC: 1 MMOL/L (ref 0–18)
APTT PPP: 138.2 SECONDS (ref 23.3–35.6)
APTT PPP: 155.8 SECONDS (ref 23.3–35.6)
APTT PPP: 89.3 SECONDS (ref 23.3–35.6)
AST SERPL-CCNC: 16 U/L (ref 15–37)
ATRIAL RATE: 81 BPM
BASOPHILS # BLD: 0 X10(3) UL (ref 0–0.2)
BASOPHILS NFR BLD: 0 %
BILIRUB SERPL-MCNC: 0.6 MG/DL (ref 0.1–2)
BUN BLD-MCNC: 40 MG/DL (ref 7–18)
CALCIUM BLD-MCNC: 8.2 MG/DL (ref 8.5–10.1)
CHLORIDE SERPL-SCNC: 106 MMOL/L (ref 98–112)
CO2 SERPL-SCNC: 36 MMOL/L (ref 21–32)
CREAT BLD-MCNC: 0.92 MG/DL
EOSINOPHIL # BLD: 0.3 X10(3) UL (ref 0–0.7)
EOSINOPHIL NFR BLD: 2 %
ERYTHROCYTE [DISTWIDTH] IN BLOOD BY AUTOMATED COUNT: 14.6 %
GLOBULIN PLAS-MCNC: 3.5 G/DL (ref 2.8–4.4)
GLUCOSE BLD-MCNC: 121 MG/DL (ref 70–99)
GLUCOSE BLD-MCNC: 139 MG/DL (ref 70–99)
GLUCOSE BLD-MCNC: 152 MG/DL (ref 70–99)
GLUCOSE BLD-MCNC: 98 MG/DL (ref 70–99)
HCT VFR BLD AUTO: 40.1 %
HGB BLD-MCNC: 12.9 G/DL
LYMPHOCYTES NFR BLD: 1.65 X10(3) UL (ref 1–4)
LYMPHOCYTES NFR BLD: 11 %
MAGNESIUM SERPL-MCNC: 2.7 MG/DL (ref 1.6–2.6)
MCH RBC QN AUTO: 28.9 PG (ref 26–34)
MCHC RBC AUTO-ENTMCNC: 32.2 G/DL (ref 31–37)
MCV RBC AUTO: 89.7 FL
MONOCYTES # BLD: 0.6 X10(3) UL (ref 0.1–1)
MONOCYTES NFR BLD: 4 %
MORPHOLOGY: NORMAL
NEUTROPHILS # BLD AUTO: 10.41 X10 (3) UL (ref 1.5–7.7)
NEUTROPHILS NFR BLD: 81 %
NEUTS BAND NFR BLD: 2 %
NEUTS HYPERSEG # BLD: 12.45 X10(3) UL (ref 1.5–7.7)
OSMOLALITY SERPL CALC.SUM OF ELEC: 309 MOSM/KG (ref 275–295)
P AXIS: 66 DEGREES
P-R INTERVAL: 134 MS
P. JIROVECII DETECTION BY PCR: NOT DETECTED
PHOSPHATE SERPL-MCNC: 2.9 MG/DL (ref 2.5–4.9)
PLATELET # BLD AUTO: 271 10(3)UL (ref 150–450)
PLATELET MORPHOLOGY: NORMAL
POTASSIUM SERPL-SCNC: 3.6 MMOL/L (ref 3.5–5.1)
PROT SERPL-MCNC: 5.4 G/DL (ref 6.4–8.2)
Q-T INTERVAL: 376 MS
QRS DURATION: 82 MS
QTC CALCULATION (BEZET): 436 MS
R AXIS: 49 DEGREES
RBC # BLD AUTO: 4.47 X10(6)UL
SODIUM SERPL-SCNC: 143 MMOL/L (ref 136–145)
T AXIS: 57 DEGREES
TOTAL CELLS COUNTED BLD: 100
VENTRICULAR RATE: 81 BPM
WBC # BLD AUTO: 15 X10(3) UL (ref 4–11)

## 2022-07-15 PROCEDURE — 99232 SBSQ HOSP IP/OBS MODERATE 35: CPT | Performed by: HOSPITALIST

## 2022-07-15 RX ORDER — ECHINACEA PURPUREA EXTRACT 125 MG
1 TABLET ORAL
Status: DISCONTINUED | OUTPATIENT
Start: 2022-07-15 | End: 2022-07-21

## 2022-07-15 RX ORDER — SODIUM CHLORIDE/ALOE VERA
GEL (GRAM) NASAL AS NEEDED
Status: DISCONTINUED | OUTPATIENT
Start: 2022-07-15 | End: 2022-07-21

## 2022-07-15 RX ORDER — TAMSULOSIN HYDROCHLORIDE 0.4 MG/1
0.4 CAPSULE ORAL
Status: DISCONTINUED | OUTPATIENT
Start: 2022-07-15 | End: 2022-07-21

## 2022-07-15 RX ORDER — ALPRAZOLAM 0.25 MG/1
0.25 TABLET ORAL EVERY 4 HOURS PRN
Status: DISCONTINUED | OUTPATIENT
Start: 2022-07-15 | End: 2022-07-21

## 2022-07-15 NOTE — PROGRESS NOTES
Patient currently on therapeutic dosing of Vanco PO for CDIFF treatment (positive 7/12). Patient was extubated today and OGT removed at that time. He is NPO, unable to take PO medications and awaiting SLP evaluation tomorrow. Flagyl 500mg IV ordered for 2 doses ([place] and 0800) to provide some CDIFF treatment until speech can see him. If he does not pass the swallow eval tomorrow then Dobhoff tube will need to be placed for medication delivery.     RACH Wan  Critical Care NP  Lyly [phone]  Pgr: [phone]

## 2022-07-15 NOTE — PLAN OF CARE
Received pt sedated with propofol/precedex/fentanyl on ventilator with heparin infusing. Opens eyes to speech, follows commands, nods appropriately. L hand noted to be more swollen than right; CCAPN M. [address] notified. L hand IV removed. Sedation weaned per order for breathing trial, pt extubated to venturi mask around 1000 with low-dose precedex infusing. Precedex weaned off per order. Pt became increasingly anxious, tachycardic, hypertensive and tachypneic; CCAPN notified, cardiology at bedside to assess patient. See MAR. Continued hypertension, tachycardia, tachypnea reported to ICU intensivist; CXR obtained. ICU intensivist at bedside to assess patient; precedex gtt restarted and titrated and pt placed on AVAPS per order. Heparin gtt titrated per protocol. Bladder scan/straight cath x1. Murphy reinserted per order from [facility] for urinary retention, monitoring urine output. Flexiseal intact, draining brown liquid stool. Speech consult ordered. Afebrile. NSR/ST on monitor. Family at bedside and updated on plan of care. See flowsheets. Will continue to monitor.

## 2022-07-15 NOTE — PROGRESS NOTES
Patient remained on the bipap overnight. No RT changes made. Weaned FiO2 as tolerated.       07/15/22 0513   BiPAP   $ RT Standby Charge (per 15 min) 1   Device V60   BiPAP / CPAP CE# 6212   BiPAP bacteria filter Yes   Mode AVAPS   Interface Full face mask   Mask Size Medium   Control Settings   Oxygen Percent 70 %   Inspiratory time 0.9   Insp rise time 3   AVAPS   Min IPAP 15   Max IPAP 30   EPAP Level 8   Set rate 14   Tidal volume 550   BiPAP/CPAP Alarm Settings   Hi Rate 40   Low Rate 10   Hi VT 1500   Low    Hi Pressure 25   Low Pressure 5   Low Pressure Delay 20   Low MV 2   BiPAP/CPAP Monitored Parameters   PIP 15   Total Rate 23 breaths/min   Minute Volume 24   Tidal Volume 994   Total Leak 15   Trigger % 99   Ti/Ttot % 35   Toleration Well

## 2022-07-16 LAB
ALBUMIN SERPL-MCNC: 2 G/DL (ref 3.4–5)
ALBUMIN/GLOB SERPL: 0.6 {RATIO} (ref 1–2)
ALP LIVER SERPL-CCNC: 50 U/L
ALT SERPL-CCNC: 62 U/L
ANION GAP SERPL CALC-SCNC: 3 MMOL/L (ref 0–18)
APTT PPP: 68.3 SECONDS (ref 23.3–35.6)
AST SERPL-CCNC: 29 U/L (ref 15–37)
BASOPHILS # BLD: 0 X10(3) UL (ref 0–0.2)
BASOPHILS NFR BLD: 0 %
BILIRUB SERPL-MCNC: 0.6 MG/DL (ref 0.1–2)
BUN BLD-MCNC: 37 MG/DL (ref 7–18)
CALCIUM BLD-MCNC: 8.4 MG/DL (ref 8.5–10.1)
CHLORIDE SERPL-SCNC: 110 MMOL/L (ref 98–112)
CO2 SERPL-SCNC: 29 MMOL/L (ref 21–32)
CREAT BLD-MCNC: 0.83 MG/DL
EOSINOPHIL # BLD: 0 X10(3) UL (ref 0–0.7)
EOSINOPHIL NFR BLD: 0 %
ERYTHROCYTE [DISTWIDTH] IN BLOOD BY AUTOMATED COUNT: 14.7 %
GLOBULIN PLAS-MCNC: 3.3 G/DL (ref 2.8–4.4)
GLUCOSE BLD-MCNC: 115 MG/DL (ref 70–99)
GLUCOSE BLD-MCNC: 116 MG/DL (ref 70–99)
GLUCOSE BLD-MCNC: 120 MG/DL (ref 70–99)
GLUCOSE BLD-MCNC: 122 MG/DL (ref 70–99)
GLUCOSE BLD-MCNC: 157 MG/DL (ref 70–99)
HCT VFR BLD AUTO: 40.2 %
HGB BLD-MCNC: 12.6 G/DL
LYMPHOCYTES NFR BLD: 1.27 X10(3) UL (ref 1–4)
LYMPHOCYTES NFR BLD: 7 %
MAGNESIUM SERPL-MCNC: 2.6 MG/DL (ref 1.6–2.6)
MCH RBC QN AUTO: 28.6 PG (ref 26–34)
MCHC RBC AUTO-ENTMCNC: 31.3 G/DL (ref 31–37)
MCV RBC AUTO: 91.4 FL
MONOCYTES # BLD: 1.09 X10(3) UL (ref 0.1–1)
MONOCYTES NFR BLD: 6 %
MORPHOLOGY: NORMAL
NEUTROPHILS # BLD AUTO: 13.39 X10 (3) UL (ref 1.5–7.7)
NEUTROPHILS NFR BLD: 84 %
NEUTS BAND NFR BLD: 3 %
NEUTS HYPERSEG # BLD: 15.83 X10(3) UL (ref 1.5–7.7)
OSMOLALITY SERPL CALC.SUM OF ELEC: 304 MOSM/KG (ref 275–295)
PHOSPHATE SERPL-MCNC: 2.4 MG/DL (ref 2.5–4.9)
PLATELET # BLD AUTO: 251 10(3)UL (ref 150–450)
PLATELET MORPHOLOGY: NORMAL
POTASSIUM SERPL-SCNC: 3.7 MMOL/L (ref 3.5–5.1)
PROT SERPL-MCNC: 5.3 G/DL (ref 6.4–8.2)
RBC # BLD AUTO: 4.4 X10(6)UL
SODIUM SERPL-SCNC: 142 MMOL/L (ref 136–145)
TOTAL CELLS COUNTED BLD: 100
WBC # BLD AUTO: 18.2 X10(3) UL (ref 4–11)

## 2022-07-16 PROCEDURE — 99233 SBSQ HOSP IP/OBS HIGH 50: CPT | Performed by: HOSPITALIST

## 2022-07-16 RX ORDER — FUROSEMIDE 10 MG/ML
20 INJECTION INTRAMUSCULAR; INTRAVENOUS ONCE
Status: COMPLETED | OUTPATIENT
Start: 2022-07-16 | End: 2022-07-16

## 2022-07-16 RX ORDER — PANTOPRAZOLE SODIUM 40 MG/1
40 TABLET, DELAYED RELEASE ORAL
Status: DISCONTINUED | OUTPATIENT
Start: 2022-07-16 | End: 2022-07-21

## 2022-07-16 RX ORDER — PREDNISONE 20 MG/1
40 TABLET ORAL
Status: DISCONTINUED | OUTPATIENT
Start: 2022-07-16 | End: 2022-07-16

## 2022-07-16 RX ORDER — ALBUTEROL SULFATE 2.5 MG/3ML
2.5 SOLUTION RESPIRATORY (INHALATION) EVERY 4 HOURS PRN
Status: DISCONTINUED | OUTPATIENT
Start: 2022-07-16 | End: 2022-07-21

## 2022-07-16 NOTE — PLAN OF CARE
Received pt alert, oriented x4, follows commands, demanding. Generalized weakness. On 3L HFNC with rhonci throughout. Weak, productive cough. Scopolamine patch in place behind L ear. Afebrile. Normal sinus rhythm/sinus tachycardia. Blood pressure stable. Incontinent of multiple loose BM's overnight. Voiding in urinal. Pt denies pain. Heparin gtt MAR. PTT per protocol. Electrolytes replaced per protocol. No further needs at this time.

## 2022-07-16 NOTE — CM/SW NOTE
Department  notified of request for Acute Rehab, aidin referrals started. Assigned CM/SW to follow up with pt/family on further discharge planning.      Camila Robles   July 16, 2022   13:25

## 2022-07-17 LAB
ALBUMIN SERPL-MCNC: 2.3 G/DL (ref 3.4–5)
ALBUMIN/GLOB SERPL: 0.7 {RATIO} (ref 1–2)
ALP LIVER SERPL-CCNC: 56 U/L
ALT SERPL-CCNC: 112 U/L
ANION GAP SERPL CALC-SCNC: 5 MMOL/L (ref 0–18)
APTT PPP: 61.6 SECONDS (ref 23.3–35.6)
AST SERPL-CCNC: 40 U/L (ref 15–37)
BASOPHILS # BLD AUTO: 0.08 X10(3) UL (ref 0–0.2)
BASOPHILS NFR BLD AUTO: 0.4 %
BILIRUB SERPL-MCNC: 0.7 MG/DL (ref 0.1–2)
BUN BLD-MCNC: 25 MG/DL (ref 7–18)
CALCIUM BLD-MCNC: 8.4 MG/DL (ref 8.5–10.1)
CHLORIDE SERPL-SCNC: 109 MMOL/L (ref 98–112)
CO2 SERPL-SCNC: 28 MMOL/L (ref 21–32)
CREAT BLD-MCNC: 0.78 MG/DL
EOSINOPHIL # BLD AUTO: 0.14 X10(3) UL (ref 0–0.7)
EOSINOPHIL NFR BLD AUTO: 0.8 %
ERYTHROCYTE [DISTWIDTH] IN BLOOD BY AUTOMATED COUNT: 14.7 %
GLOBULIN PLAS-MCNC: 3.5 G/DL (ref 2.8–4.4)
GLUCOSE BLD-MCNC: 108 MG/DL (ref 70–99)
GLUCOSE BLD-MCNC: 111 MG/DL (ref 70–99)
GLUCOSE BLD-MCNC: 121 MG/DL (ref 70–99)
GLUCOSE BLD-MCNC: 125 MG/DL (ref 70–99)
HCT VFR BLD AUTO: 40.8 %
HGB BLD-MCNC: 12.9 G/DL
IMM GRANULOCYTES # BLD AUTO: 0.41 X10(3) UL (ref 0–1)
IMM GRANULOCYTES NFR BLD: 2.3 %
LYMPHOCYTES # BLD AUTO: 3.52 X10(3) UL (ref 1–4)
LYMPHOCYTES NFR BLD AUTO: 19.8 %
MAGNESIUM SERPL-MCNC: 2.5 MG/DL (ref 1.6–2.6)
MCH RBC QN AUTO: 28.5 PG (ref 26–34)
MCHC RBC AUTO-ENTMCNC: 31.6 G/DL (ref 31–37)
MCV RBC AUTO: 90.1 FL
MONOCYTES # BLD AUTO: 1.31 X10(3) UL (ref 0.1–1)
MONOCYTES NFR BLD AUTO: 7.4 %
NEUTROPHILS # BLD AUTO: 12.35 X10 (3) UL (ref 1.5–7.7)
NEUTROPHILS # BLD AUTO: 12.35 X10(3) UL (ref 1.5–7.7)
NEUTROPHILS NFR BLD AUTO: 69.3 %
OSMOLALITY SERPL CALC.SUM OF ELEC: 300 MOSM/KG (ref 275–295)
PHOSPHATE SERPL-MCNC: 2.1 MG/DL (ref 2.5–4.9)
PLATELET # BLD AUTO: 294 10(3)UL (ref 150–450)
POTASSIUM SERPL-SCNC: 3.4 MMOL/L (ref 3.5–5.1)
POTASSIUM SERPL-SCNC: 3.4 MMOL/L (ref 3.5–5.1)
POTASSIUM SERPL-SCNC: 3.5 MMOL/L (ref 3.5–5.1)
PROT SERPL-MCNC: 5.8 G/DL (ref 6.4–8.2)
RBC # BLD AUTO: 4.53 X10(6)UL
SODIUM SERPL-SCNC: 142 MMOL/L (ref 136–145)
WBC # BLD AUTO: 17.8 X10(3) UL (ref 4–11)

## 2022-07-17 PROCEDURE — 99233 SBSQ HOSP IP/OBS HIGH 50: CPT | Performed by: HOSPITALIST

## 2022-07-17 RX ORDER — BENZONATATE 100 MG/1
100 CAPSULE ORAL 3 TIMES DAILY
Status: DISCONTINUED | OUTPATIENT
Start: 2022-07-17 | End: 2022-07-21

## 2022-07-17 RX ORDER — POTASSIUM CHLORIDE 20 MEQ/1
40 TABLET, EXTENDED RELEASE ORAL EVERY 4 HOURS
Status: COMPLETED | OUTPATIENT
Start: 2022-07-17 | End: 2022-07-17

## 2022-07-17 RX ORDER — GUAIFENESIN 600 MG
600 TABLET, EXTENDED RELEASE 12 HR ORAL 2 TIMES DAILY
Status: DISCONTINUED | OUTPATIENT
Start: 2022-07-17 | End: 2022-07-21

## 2022-07-17 RX ORDER — FUROSEMIDE 10 MG/ML
20 INJECTION INTRAMUSCULAR; INTRAVENOUS
Status: COMPLETED | OUTPATIENT
Start: 2022-07-17 | End: 2022-07-17

## 2022-07-17 NOTE — PROGRESS NOTES
07/17/22 0415   Provider Notification   Reason for Communication Evaluate   Provider Name Other (comment)  (Kevin Bai)   Method of Communication Page   Response Waiting for response   Notification Time [phone]   pt's am /99  -PRN hydralazine for SBP>160 - ok to add parameters for DBP? or give 0930 atenolol 25mg now?     Per hospitalist - give atenolol now, wctm

## 2022-07-18 LAB
ALBUMIN SERPL-MCNC: 2.5 G/DL (ref 3.4–5)
ALBUMIN/GLOB SERPL: 0.7 {RATIO} (ref 1–2)
ALP LIVER SERPL-CCNC: 56 U/L
ALT SERPL-CCNC: 115 U/L
ANION GAP SERPL CALC-SCNC: 6 MMOL/L (ref 0–18)
APTT PPP: 28.2 SECONDS (ref 23.3–35.6)
AST SERPL-CCNC: 49 U/L (ref 15–37)
ATRIAL RATE: 92 BPM
BASOPHILS # BLD AUTO: 0.04 X10(3) UL (ref 0–0.2)
BASOPHILS NFR BLD AUTO: 0.3 %
BILIRUB SERPL-MCNC: 1 MG/DL (ref 0.1–2)
BUN BLD-MCNC: 21 MG/DL (ref 7–18)
CALCIUM BLD-MCNC: 8.6 MG/DL (ref 8.5–10.1)
CHLORIDE SERPL-SCNC: 105 MMOL/L (ref 98–112)
CO2 SERPL-SCNC: 28 MMOL/L (ref 21–32)
CREAT BLD-MCNC: 0.79 MG/DL
EOSINOPHIL # BLD AUTO: 0.21 X10(3) UL (ref 0–0.7)
EOSINOPHIL NFR BLD AUTO: 1.4 %
ERYTHROCYTE [DISTWIDTH] IN BLOOD BY AUTOMATED COUNT: 14.6 %
GLOBULIN PLAS-MCNC: 3.7 G/DL (ref 2.8–4.4)
GLUCOSE BLD-MCNC: 100 MG/DL (ref 70–99)
GLUCOSE BLD-MCNC: 106 MG/DL (ref 70–99)
GLUCOSE BLD-MCNC: 110 MG/DL (ref 70–99)
GLUCOSE BLD-MCNC: 115 MG/DL (ref 70–99)
GLUCOSE BLD-MCNC: 116 MG/DL (ref 70–99)
GLUCOSE BLD-MCNC: 148 MG/DL (ref 70–99)
GLUCOSE BLD-MCNC: 88 MG/DL (ref 70–99)
HCT VFR BLD AUTO: 42.9 %
HGB BLD-MCNC: 13.4 G/DL
IMM GRANULOCYTES # BLD AUTO: 0.2 X10(3) UL (ref 0–1)
IMM GRANULOCYTES NFR BLD: 1.4 %
LYMPHOCYTES # BLD AUTO: 3.75 X10(3) UL (ref 1–4)
LYMPHOCYTES NFR BLD AUTO: 25.5 %
MAGNESIUM SERPL-MCNC: 2.5 MG/DL (ref 1.6–2.6)
MCH RBC QN AUTO: 28.2 PG (ref 26–34)
MCHC RBC AUTO-ENTMCNC: 31.2 G/DL (ref 31–37)
MCV RBC AUTO: 90.1 FL
MONOCYTES # BLD AUTO: 1.27 X10(3) UL (ref 0.1–1)
MONOCYTES NFR BLD AUTO: 8.6 %
NEUTROPHILS # BLD AUTO: 9.24 X10 (3) UL (ref 1.5–7.7)
NEUTROPHILS # BLD AUTO: 9.24 X10(3) UL (ref 1.5–7.7)
NEUTROPHILS NFR BLD AUTO: 62.8 %
OSMOLALITY SERPL CALC.SUM OF ELEC: 292 MOSM/KG (ref 275–295)
P AXIS: 56 DEGREES
P-R INTERVAL: 128 MS
PHOSPHATE SERPL-MCNC: 2.7 MG/DL (ref 2.5–4.9)
PLATELET # BLD AUTO: 279 10(3)UL (ref 150–450)
POTASSIUM SERPL-SCNC: 3.6 MMOL/L (ref 3.5–5.1)
POTASSIUM SERPL-SCNC: 3.9 MMOL/L (ref 3.5–5.1)
PROT SERPL-MCNC: 6.2 G/DL (ref 6.4–8.2)
Q-T INTERVAL: 362 MS
QRS DURATION: 84 MS
QTC CALCULATION (BEZET): 447 MS
R AXIS: 42 DEGREES
RBC # BLD AUTO: 4.76 X10(6)UL
SODIUM SERPL-SCNC: 139 MMOL/L (ref 136–145)
T AXIS: 65 DEGREES
VENTRICULAR RATE: 92 BPM
WBC # BLD AUTO: 14.7 X10(3) UL (ref 4–11)

## 2022-07-18 PROCEDURE — 99233 SBSQ HOSP IP/OBS HIGH 50: CPT | Performed by: HOSPITALIST

## 2022-07-18 RX ORDER — ATENOLOL 25 MG/1
37.5 TABLET ORAL DAILY
Status: DISCONTINUED | OUTPATIENT
Start: 2022-07-18 | End: 2022-07-21

## 2022-07-18 RX ORDER — FUROSEMIDE 10 MG/ML
20 INJECTION INTRAMUSCULAR; INTRAVENOUS ONCE
Status: COMPLETED | OUTPATIENT
Start: 2022-07-18 | End: 2022-07-18

## 2022-07-18 RX ORDER — POTASSIUM CHLORIDE 20 MEQ/1
40 TABLET, EXTENDED RELEASE ORAL EVERY 4 HOURS
Status: COMPLETED | OUTPATIENT
Start: 2022-07-18 | End: 2022-07-18

## 2022-07-18 NOTE — CM/SW NOTE
Kimberly dave sahara lopez would consider patient if:    1) stable off droplet precautions--no longer coughing or sneezing  2) at 4L O2 via NC or less during therapies and rest while maintaining O2 saturations > 90%  3) WBC no higher than 14 and continuing to decrease  4) eating at least 50% of every meal per the consistency approved by speech therapy  5) labs stable off IV fluids for over 24hrs  6) off all IV BP and IV pain medications for at least 24hrs. IV Antibiotics are OK. This is the only accepting facility at this time. 1: Spoke with kumar from infection control and dr suárez. Orders received to dc, patient w/continued cough that will occur d/t recent flu, covid and metapneumo. rn updated. 2: per PT today, ambulated 20 ft at 2L NC.   3: WBC 14.7 today from 17.8 yesterday. 4: eating % of meals  5: ivf dc'd 7/13  6: last iv lopressor given 7/16    Spoke with patient and wife at bedside, gave list of accepting facilities, explained limited options d/t insurance. Agreeable to [facility]. Call to davion at  and left , referral updated.     Maria Luz Mercado, RN,   [id number]

## 2022-07-18 NOTE — CM/SW NOTE
MERNA placed call to [facility] regarding acute rehab referral. Left VM for Carolyn Albert, intake supervisor for [facility] ([phone]). Awaiting call back. MERNA spoke with Karyle Manners at [employer]. Karyle Manners stated [facility] is OON with pt's insurance. MERNA called Sean Atkinson ([phone]) at [facility] and left VM. Awaiting call back. GAIL referrals sent in [address] [facility] [address] as backup plan. 12:15pm - Received call back from Sean Atkinson at [facility]. Sean Atkinson stated two [facility] locations are in-network with [facility]. [facility] in [facility] and [address] in [place]. Hollyyolanda China stated she is checking with the facilities for bed availability and if pt medically qualifies for acute rehab. Sean Atkinson stated she will reach out to [address] will continue to follow. 12:50pm - Received message through [address] [facility] [address] [address] from Sean Atkinson at [facility]. Sean Atkinson stated [facility] is in-network with [facility]. Holly China stated [facility] is unable to accept pt while on droplet precautions. MERNA messaged RN who stated droplet precautions do not have a DC date. Shruthi from [facility] provided medical guidelines in order to admit pt. From Shruthi at [facility]:  1) stable off droplet precautions--no longer coughing or sneezing  2) at 4L O2 via NC or less during therapies and rest while maintaining O2 saturations > 90%  3) WBC no higher than 14 and continuing to decrease  4) eating at least 50% of every meal per the consistency approved by speech therapy  5) labs stable off IV fluids for over 24hrs  6) off all IV BP and IV pain medications for at least 24hrs. IV Antibiotics are OK.

## 2022-07-19 LAB
ANION GAP SERPL CALC-SCNC: 3 MMOL/L (ref 0–18)
BASOPHILS # BLD AUTO: 0.04 X10(3) UL (ref 0–0.2)
BASOPHILS NFR BLD AUTO: 0.3 %
BUN BLD-MCNC: 24 MG/DL (ref 7–18)
CALCIUM BLD-MCNC: 8.4 MG/DL (ref 8.5–10.1)
CHLORIDE SERPL-SCNC: 109 MMOL/L (ref 98–112)
CO2 SERPL-SCNC: 28 MMOL/L (ref 21–32)
CREAT BLD-MCNC: 0.71 MG/DL
EOSINOPHIL # BLD AUTO: 0.34 X10(3) UL (ref 0–0.7)
EOSINOPHIL NFR BLD AUTO: 2.7 %
ERYTHROCYTE [DISTWIDTH] IN BLOOD BY AUTOMATED COUNT: 14.9 %
GLUCOSE BLD-MCNC: 100 MG/DL (ref 70–99)
GLUCOSE BLD-MCNC: 112 MG/DL (ref 70–99)
GLUCOSE BLD-MCNC: 116 MG/DL (ref 70–99)
GLUCOSE BLD-MCNC: 135 MG/DL (ref 70–99)
GLUCOSE BLD-MCNC: 139 MG/DL (ref 70–99)
HCT VFR BLD AUTO: 40.2 %
HGB BLD-MCNC: 12.4 G/DL
IMM GRANULOCYTES # BLD AUTO: 0.15 X10(3) UL (ref 0–1)
IMM GRANULOCYTES NFR BLD: 1.2 %
LYMPHOCYTES # BLD AUTO: 3.29 X10(3) UL (ref 1–4)
LYMPHOCYTES NFR BLD AUTO: 26.6 %
MCH RBC QN AUTO: 28.4 PG (ref 26–34)
MCHC RBC AUTO-ENTMCNC: 30.8 G/DL (ref 31–37)
MCV RBC AUTO: 92.2 FL
MONOCYTES # BLD AUTO: 1.17 X10(3) UL (ref 0.1–1)
MONOCYTES NFR BLD AUTO: 9.4 %
NEUTROPHILS # BLD AUTO: 7.4 X10 (3) UL (ref 1.5–7.7)
NEUTROPHILS # BLD AUTO: 7.4 X10(3) UL (ref 1.5–7.7)
NEUTROPHILS NFR BLD AUTO: 59.8 %
OSMOLALITY SERPL CALC.SUM OF ELEC: 295 MOSM/KG (ref 275–295)
PLATELET # BLD AUTO: 271 10(3)UL (ref 150–450)
POTASSIUM SERPL-SCNC: 3.8 MMOL/L (ref 3.5–5.1)
RBC # BLD AUTO: 4.36 X10(6)UL
SODIUM SERPL-SCNC: 140 MMOL/L (ref 136–145)
WBC # BLD AUTO: 12.4 X10(3) UL (ref 4–11)

## 2022-07-19 PROCEDURE — 99232 SBSQ HOSP IP/OBS MODERATE 35: CPT | Performed by: HOSPITALIST

## 2022-07-19 RX ORDER — FUROSEMIDE 20 MG/1
20 TABLET ORAL DAILY
Status: DISCONTINUED | OUTPATIENT
Start: 2022-07-19 | End: 2022-07-21

## 2022-07-19 RX ORDER — POTASSIUM CHLORIDE 20 MEQ/1
40 TABLET, EXTENDED RELEASE ORAL ONCE
Status: COMPLETED | OUTPATIENT
Start: 2022-07-19 | End: 2022-07-19

## 2022-07-19 NOTE — PHYSICAL THERAPY NOTE
PHYSICAL THERAPY TREATMENT NOTE - INPATIENT    Room Number: 990/158-V     Session: 1     Number of Visits to Meet Established Goals: 6    Presenting Problem: ARF, intubated -22     ASSESSMENT   Pt is highly motivated to partcipate in therapy. Cont to have poor task tolerance with fatigue and SOB with minimal activity requiring 02 supplementation of at least 2L/nc and increase standing or seated rest between activities. HR at 138 with activity. Desat to 87% on RW with minimal activity. Gt training and standing exes with increase time for task completion. Amb 48' with standing rest between 25' and long seated rest after 50'     Pt will benefit from ongoing PT to continue to improve strength, endurance, balance, and gait in order to facilitate to  highest  functional skills. Recommend AR at d/c to maximize functional independence and safety with mobilities prior to going home. Instructed pt to be up on the recliner and walk with staff several times while in [address]  PT Discharge Recommendations: Acute rehabilitation     PLAN  PT Treatment Plan: Bed mobility; Endurance; Energy conservation;Patient education; Family education;Gait training;Strengthening;Transfer training  Rehab Potential : Good  Frequency (Obs): 3-5x/week    CURRENT GOALS     Goal #1 Patient is able to demonstrate supine - sit EOB @ level: independently   Goal #2 Patient is able to demonstrate transfers Sit to/from Stand at assistance level: Yolanda    Goal #3 Patient is able to ambulate 100 feet with assist device: walker - rolling at assistance level: supervision      Goal #4     Goal #5     Goal #6     Goal Comments: Goals established on 7/15/2022                                   2022 all goals ongoing      SUBJECTIVE  I just easy to fatigue    OBJECTIVE  Precautions: Bed/chair alarm    WEIGHT BEARING RESTRICTION  Weight Bearing Restriction: None                PAIN ASSESSMENT   Ratin          BALANCE Static Sitting: Good  Dynamic Sitting: Good           Static Standing: Fair -  Dynamic Standing: Fair -    ACTIVITY TOLERANCE; P+     O2 WALK;87% on RA with minimal activity                  98% with 2L/nc with ambulation at 22' but with significant SOB           AM-PAC '6-Clicks' INPATIENT SHORT FORM - BASIC MOBILITY  How much difficulty does the patient currently have. .. Patient Difficulty: Turning over in bed (including adjusting bedclothes, sheets and blankets)?: None   Patient Difficulty: Sitting down on and standing up from a chair with arms (e.g., wheelchair, bedside commode, etc.): A Little   Patient Difficulty: Moving from lying on back to sitting on the side of the bed?: None   How much help from another person does the patient currently need. ..    Help from Another: Moving to and from a bed to a chair (including a wheelchair)?: A Little   Help from Another: Need to walk in hospital room?: A Little   Help from Another: Climbing 3-5 steps with a railing?: A Lot       AM-PAC Score:  Raw Score: 19   Approx Degree of Impairment: 41.77%   Standardized Score (AM-PAC Scale): 45.44   CMS Modifier (G-Code): CK    FUNCTIONAL ABILITY STATUS  Gait Assessment   Functional Mobility/Gait Assessment  Gait Assistance: Contact guard assist;Minimum assistance  Distance (ft): 50x4  Assistive Device: Rolling walker  Pattern:  (decrease stride length, flexrd posturing)    Skilled Therapy Provided    Bed Mobility:  Rolling right: mod I  Rolling left: mod I    Supine<>Sit: SBA with increase time  Sit<>Supine: NT    Transfer Mobility:  Sit<>Stand: Min A cueing on approp hand placements and body mechanics  Stand<>Sit: MIN A with cueing on safety awareness  Gait: Gt is slow and steady but with poor task tolerance on 2L/NC with activity requiring standing rest after 25' then proceeding to have a seated rest after another 22' to catch his breath. Therapist's Comments:   Motivated  Needed extra time to rest between activities  Needed 2L/nc for good saturation when walking. active  92% with rest on RA  87% with activity on RA  Instructed with AROM on B LE while seated   Standing balance c RW as support   Left on the recliner and addressed all issues and concern    THERAPEUTIC EXERCISES  Lower Extremity Ankle pumps   Leg lifts  Knee ext  Heel slides  QS     Upper Extremity      Position Standing/sitting     Repetitions   10   Sets   1     Patient End of Session: Up in chair;Needs met;Call light within reach;RN aware of session/findings; All patient questions and concerns addressed

## 2022-07-19 NOTE — PROGRESS NOTES
07/19/22 1003   Mobility   O2 walk? Yes   SPO2% on Room Air at Rest 94   At rest oxygen flow (liters per minute) 0   SPO2% Ambulation on Room Air 91   Ambulation oxygen flow (liters per minute) 0      PT walked with pt longer distance. Pt needed 1-2L with ambulation. Desat ed 87% on RA.

## 2022-07-19 NOTE — CM/SW NOTE
Call to davion at [facility] 77 [phone], patient is clinically accepted. They have submitted for insurance auth.      Lee Jay RN,   [id number]

## 2022-07-19 NOTE — PROGRESS NOTES
07/19/22 1105   Mobility   O2 walk? Yes   SPO2% on Room Air at Rest 93   At rest oxygen flow (liters per minute) 0   SPO2% Ambulation on Room Air 87   SPO2% Ambulation on Oxygen 97   Ambulation oxygen flow (liters per minute) 2     PT walked with pt longer distance. Pt needed 1-2L O2.

## 2022-07-20 LAB
GLUCOSE BLD-MCNC: 116 MG/DL (ref 70–99)
GLUCOSE BLD-MCNC: 125 MG/DL (ref 70–99)
GLUCOSE BLD-MCNC: 146 MG/DL (ref 70–99)
GLUCOSE BLD-MCNC: 90 MG/DL (ref 70–99)
POTASSIUM SERPL-SCNC: 4.1 MMOL/L (ref 3.5–5.1)

## 2022-07-20 PROCEDURE — 99233 SBSQ HOSP IP/OBS HIGH 50: CPT | Performed by: HOSPITALIST

## 2022-07-20 NOTE — PLAN OF CARE
A&O x4. RA. 1-2L with ambulation. SOB with exertion. Congested cough. Encouraged IS. Tele - NSR to ST with exertion. Eliquis. Voids per urinal. + Cdiff. No reports of pain. Up standby with walker. Vanco PO. QID accuchecks. Regular diet. Awaiting insurance authorization. Pt updated on POC. No further needs at this time.       Problem: Patient/Family Goals  Goal: Patient/Family Long Term Goal  Description: Patient's Long Term Goal: Discharge home with proper resources    Interventions:  - Follow plan of care  - See additional Care Plan goals for specific interventions  Outcome: Progressing  Goal: Patient/Family Short Term Goal  Description: Patient's Short Term Goal:   7/6 AM: wean O2  7/7 ([address] [facility]): Able to sleep well, wean O2 as tolerated  7/16 AM: wean O2, sit up to chair for meals  7/16(noc):  wean O2, void per urinal  7/17 AM: wean O2  7/17 NOC: monitor HR, sleep  7/18: wean O2, increase activity  7/19: wean O2 and tolerate activity     Interventions:   -IS  -IV solumedrol  -O2 walk  -Cluster care  -Dim lighting  - See additional Care Plan goals for specific interventions  Outcome: Progressing     Problem: RESPIRATORY - ADULT  Goal: Achieves optimal ventilation and oxygenation  Description: INTERVENTIONS:  - Assess for changes in respiratory status  - Assess for changes in mentation and behavior  - Position to facilitate oxygenation and minimize respiratory effort  - Oxygen supplementation based on oxygen saturation or ABGs  - Provide Smoking Cessation handout, if applicable  - Encourage broncho-pulmonary hygiene including cough, deep breathe, Incentive Spirometry  - Assess the need for suctioning and perform as needed  - Assess and instruct to report SOB or any respiratory difficulty  - Respiratory Therapy support as indicated  - Manage/alleviate anxiety  - Monitor for signs/symptoms of CO2 retention  Outcome: Progressing     Problem: Delirium  Goal: Minimize duration of delirium  Description: Interventions:  - Encourage use of hearing aids, eye glasses  - Promote highest level of mobility daily  - Provide frequent reorientation  - Promote wakefulness i.e. lights on, blinds open  - Promote sleep, encourage patient's normal rest cycle i.e. lights off, TV off, minimize noise and interruptions  - Encourage family to assist in orientation and promotion of home routines  Outcome: Progressing

## 2022-07-20 NOTE — CM/SW NOTE
SW spoke with Kimi Caban from [facility] regarding insurance [facility]. Kimi Caban stated pt's insurance reached out to her this morning and requested updated hospitalist note, note was sent to insurance by Kimi Caban. Insurance [facility] is still pending.      SILVINA Salgado  Discharge Planner

## 2022-07-21 VITALS
BODY MASS INDEX: 32.18 KG/M2 | RESPIRATION RATE: 20 BRPM | OXYGEN SATURATION: 94 % | WEIGHT: 205 LBS | HEIGHT: 67 IN | HEART RATE: 70 BPM | SYSTOLIC BLOOD PRESSURE: 104 MMHG | TEMPERATURE: 98 F | DIASTOLIC BLOOD PRESSURE: 62 MMHG

## 2022-07-21 LAB
GLUCOSE BLD-MCNC: 102 MG/DL (ref 70–99)
GLUCOSE BLD-MCNC: 83 MG/DL (ref 70–99)

## 2022-07-21 PROCEDURE — 99239 HOSP IP/OBS DSCHRG MGMT >30: CPT | Performed by: HOSPITALIST

## 2022-07-21 RX ORDER — GUAIFENESIN 600 MG
600 TABLET, EXTENDED RELEASE 12 HR ORAL 2 TIMES DAILY PRN
Qty: 10 TABLET | Refills: 0 | Status: SHIPPED | COMMUNITY
Start: 2022-07-21

## 2022-07-21 RX ORDER — VANCOMYCIN HYDROCHLORIDE 125 MG/1
125 CAPSULE ORAL 4 TIMES DAILY
Qty: 36 CAPSULE | Refills: 0 | Status: SHIPPED | OUTPATIENT
Start: 2022-07-21 | End: 2022-07-30

## 2022-07-21 RX ORDER — ATENOLOL 25 MG/1
25 TABLET ORAL DAILY
Refills: 0 | Status: SHIPPED | COMMUNITY
Start: 2022-07-21

## 2022-07-21 RX ORDER — TAMSULOSIN HYDROCHLORIDE 0.4 MG/1
0.4 CAPSULE ORAL DAILY
Qty: 30 CAPSULE | Refills: 0 | Status: SHIPPED | OUTPATIENT
Start: 2022-07-21 | End: 2022-08-20

## 2022-07-21 NOTE — PROGRESS NOTES
NURSING DISCHARGE NOTE    Discharged Home via Wheelchair. Accompanied by Family member  Belongings Taken by patient/family. Pt's IV removed. Tele and pulse ox cleaned and returned. All paperwork and prescriptions given to pt and spouse. All questions and concerns were answered. Discharge navigators completed.

## 2022-07-21 NOTE — PROGRESS NOTES
Patient seen and examined  Chest: CTA B/L  CVS: S1, S2, RRR  ABD: Soft, NT, ND, BS+  EXT: No c/c    Cleared for Joanie MD Elijah

## 2022-07-21 NOTE — CM/SW NOTE
MERNA received message from [place] at [facility]:    We heard back from insurance this morning. Sadly Mr. Jalen Farrell was denied. The insurance does not offer a peer to peer but they did mention that if the hospital the patient is at feels he truly needs acute rehab they can call an appeal phone # [phone]. SW called [phone] and spoke with Annie Mariee at [employer]. Annie Mariee stated she manages the peer to peer scheduling and not appeals. MERNA informed Annie Mariee of information provided to Poppy Walls stated [phone] number is incorrect and provided SW with [facility] [place] number [phone]. Annie Mariee was able to access pt's denial letter given to [facility] and provided SW with contact information for [facility]  Anca Rg [phone]. MERNA called [facility]  Anca Rg at [phone] ext. [phone] and left VM. MERNA called [facility] [place] number [phone] and after numerous attempts to speak with a representative MERNA spoke with Mustapha Black. Call ref #[id number]. Mustapha Black stated she looked up the inpatient rehab benefit of pt's plan and pt has $3,000 co-pay per day. Mustapha Black stated the co-pay applies to inpatient services (acute and GAIL). Mustapha Black stated peer to peer is not an option for an insurance denial unless it is pharmacy related. She stated the pt has an option to file an appeal, but the expedited appeal can take 72 up to 30 days. Mustapha Black reached out to [facility] and [facility] department to determine reason for denial.   Mustapha Black stated pt has home health coverage and has 50% co-insurance, OOP has been met so pt should not have any financial responsibility for home health. Mustapha Black stated [facility] can try to submit for auth through the pt's health plan by completing inpatient authorization form on [place] [address] website and faxing clinicals to [phone]    [facility] referrals sent in [facility]. MERNA reached out to OT to re-eval pt. Per OT, pt is on her list to assess today and she will consult PT as well.    2:30pm - Spoke with OT and PT.  Per Therapy, pt has improved and is okay to discharge home. OT stated they will be recommending outpatient therapy. SW met with pt and pt's wife at bedside to discuss discharge planning. SW discussed insurance denial and [facility] referrals sent. Pt stated he would prefer to complete outpatient therapy and has all needed DME at home. [facility] referrals cancelled in [address]. Pt did not express any questions/concerns regarding discharge at this time. SW will continue to remain available for any additional discharge needs.

## 2022-07-24 NOTE — PAYOR COMM NOTE
Discharge Notification    Patient Name: Chante Lerma  Payor: Debby Clements #: [id number]  Authorization Number: [id number]  Admit Date/Time: 7/6/2022 10:31 AM  Discharge Date/Time: 7/21/2022 4:31 PM

## 2022-07-25 ENCOUNTER — TELEPHONE (OUTPATIENT)
Dept: FAMILY MEDICINE CLINIC | Facility: CLINIC | Age: 62
End: 2022-07-25

## 2022-07-25 NOTE — TELEPHONE ENCOUNTER
Colette from [facility] stating that patient is refusing home health.   Any questions please call her at [phone]

## 2022-07-26 NOTE — TELEPHONE ENCOUNTER
Pt discharged from 13 day Hospital stay 07/21/22 for acute respiratory failure and CHF. Home health coordinated per hospital providers but patient refused to schedule start of care. Patient scheduled for apt with  08/11/22.    Sent to provider as Gold Salmon

## 2022-08-03 ENCOUNTER — ORDER TRANSCRIPTION (OUTPATIENT)
Dept: ADMINISTRATIVE | Facility: HOSPITAL | Age: 62
End: 2022-08-03

## 2022-08-03 DIAGNOSIS — Z01.818 PREOP TESTING: Primary | ICD-10-CM

## 2022-08-03 DIAGNOSIS — R06.00 DYSPNEA: Primary | ICD-10-CM

## 2022-08-16 ENCOUNTER — OFFICE VISIT (OUTPATIENT)
Dept: FAMILY MEDICINE CLINIC | Facility: CLINIC | Age: 62
End: 2022-08-16
Payer: COMMERCIAL

## 2022-08-16 VITALS
TEMPERATURE: 98 F | HEIGHT: 67 IN | SYSTOLIC BLOOD PRESSURE: 120 MMHG | BODY MASS INDEX: 32.33 KG/M2 | HEART RATE: 65 BPM | WEIGHT: 206 LBS | RESPIRATION RATE: 21 BRPM | DIASTOLIC BLOOD PRESSURE: 68 MMHG | OXYGEN SATURATION: 98 %

## 2022-08-16 DIAGNOSIS — Z87.09 HISTORY OF RESPIRATORY FAILURE: Primary | ICD-10-CM

## 2022-08-16 DIAGNOSIS — Z86.19 HISTORY OF CLOSTRIDIOIDES DIFFICILE INFECTION: ICD-10-CM

## 2022-08-16 DIAGNOSIS — R73.03 PREDIABETES: ICD-10-CM

## 2022-08-16 DIAGNOSIS — I10 ESSENTIAL HYPERTENSION: ICD-10-CM

## 2022-08-16 DIAGNOSIS — R13.12 OROPHARYNGEAL DYSPHAGIA: ICD-10-CM

## 2022-08-16 DIAGNOSIS — I26.94 MULTIPLE SUBSEGMENTAL PULMONARY EMBOLI WITHOUT ACUTE COR PULMONALE (HCC): ICD-10-CM

## 2022-08-16 DIAGNOSIS — Z87.828 HISTORY OF KIDNEY INJURY: ICD-10-CM

## 2022-08-16 DIAGNOSIS — Z87.01 HISTORY OF PNEUMONIA: ICD-10-CM

## 2022-08-16 DIAGNOSIS — Z86.19 HISTORY OF SEPSIS: ICD-10-CM

## 2022-08-16 DIAGNOSIS — F10.11 MILD ALCOHOL ABUSE IN EARLY REMISSION: ICD-10-CM

## 2022-08-16 DIAGNOSIS — I50.32 CHRONIC DIASTOLIC CONGESTIVE HEART FAILURE (HCC): ICD-10-CM

## 2022-08-16 DIAGNOSIS — R06.09 DOE (DYSPNEA ON EXERTION): ICD-10-CM

## 2022-08-16 PROBLEM — R53.83 LETHARGY: Status: RESOLVED | Noted: 2022-06-22 | Resolved: 2022-08-16

## 2022-08-16 PROBLEM — R09.02 HYPOXIA: Status: RESOLVED | Noted: 2022-06-02 | Resolved: 2022-08-16

## 2022-08-16 PROBLEM — I95.89 IATROGENIC HYPOTENSION: Status: RESOLVED | Noted: 2022-06-02 | Resolved: 2022-08-16

## 2022-08-16 PROBLEM — E87.20 LACTIC ACIDOSIS: Status: RESOLVED | Noted: 2022-05-18 | Resolved: 2022-08-16

## 2022-08-16 PROBLEM — S16.1XXA CERVICAL STRAIN, ACUTE: Status: RESOLVED | Noted: 2018-07-10 | Resolved: 2022-08-16

## 2022-08-16 PROBLEM — E87.2 LACTIC ACIDOSIS: Status: RESOLVED | Noted: 2022-05-18 | Resolved: 2022-08-16

## 2022-08-16 PROCEDURE — 3078F DIAST BP <80 MM HG: CPT | Performed by: FAMILY MEDICINE

## 2022-08-16 PROCEDURE — 3074F SYST BP LT 130 MM HG: CPT | Performed by: FAMILY MEDICINE

## 2022-08-16 PROCEDURE — 3008F BODY MASS INDEX DOCD: CPT | Performed by: FAMILY MEDICINE

## 2022-08-16 PROCEDURE — 99214 OFFICE O/P EST MOD 30 MIN: CPT | Performed by: FAMILY MEDICINE

## 2022-08-26 ENCOUNTER — TELEPHONE (OUTPATIENT)
Dept: FAMILY MEDICINE CLINIC | Facility: CLINIC | Age: 62
End: 2022-08-26

## 2022-08-26 NOTE — TELEPHONE ENCOUNTER
----- Message from Hany Solano, DO sent at 8/24/2022  5:33 PM CDT -----  Results reviewed. Released to [address] Test show no significant abnormality. Please let me know if you have any questions.

## 2022-09-02 NOTE — TELEPHONE ENCOUNTER
Pt returned call  Unable to find results   Everything has been resulted.    No further action required

## 2022-09-04 ENCOUNTER — OFFICE VISIT (OUTPATIENT)
Dept: SLEEP CENTER | Age: 62
End: 2022-09-04
Attending: INTERNAL MEDICINE
Payer: COMMERCIAL

## 2022-09-04 DIAGNOSIS — G47.33 OSA (OBSTRUCTIVE SLEEP APNEA): ICD-10-CM

## 2022-09-04 PROCEDURE — 95810 POLYSOM 6/> YRS 4/> PARAM: CPT

## 2022-09-13 LAB — HEMOGLOBIN A1C: 5.4 % OF TOTAL HGB

## 2022-09-16 ENCOUNTER — ORDER TRANSCRIPTION (OUTPATIENT)
Dept: SLEEP CENTER | Age: 62
End: 2022-09-16

## 2022-10-01 ENCOUNTER — LAB ENCOUNTER (OUTPATIENT)
Dept: LAB | Facility: HOSPITAL | Age: 62
End: 2022-10-01
Attending: INTERNAL MEDICINE
Payer: COMMERCIAL

## 2022-10-01 DIAGNOSIS — Z01.818 PREOP TESTING: ICD-10-CM

## 2022-10-01 LAB — SARS-COV-2 RNA RESP QL NAA+PROBE: NOT DETECTED

## 2022-10-04 ENCOUNTER — HOSPITAL ENCOUNTER (OUTPATIENT)
Dept: CT IMAGING | Facility: HOSPITAL | Age: 62
Discharge: HOME | End: 2022-10-04
Attending: INTERNAL MEDICINE
Payer: COMMERCIAL

## 2022-10-04 ENCOUNTER — APPOINTMENT (OUTPATIENT)
Dept: RESPIRATORY THERAPY | Facility: HOSPITAL | Age: 62
End: 2022-10-04
Attending: INTERNAL MEDICINE
Payer: COMMERCIAL

## 2022-10-04 DIAGNOSIS — I26.99 PULMONARY EMBOLISM (HCC): ICD-10-CM

## 2022-10-04 DIAGNOSIS — R06.00 DYSPNEA: ICD-10-CM

## 2022-10-04 DIAGNOSIS — R91.8 PULMONARY INFILTRATES: ICD-10-CM

## 2022-10-04 LAB
CREAT BLD-MCNC: 0.8 MG/DL
GFR SERPLBLD BASED ON 1.73 SQ M-ARVRAT: 99 ML/MIN/1.73M2 (ref 60–?)

## 2022-10-04 PROCEDURE — 94726 PLETHYSMOGRAPHY LUNG VOLUMES: CPT

## 2022-10-04 PROCEDURE — 94060 EVALUATION OF WHEEZING: CPT

## 2022-10-04 PROCEDURE — 82565 ASSAY OF CREATININE: CPT

## 2022-10-04 PROCEDURE — 71260 CT THORAX DX C+: CPT | Performed by: INTERNAL MEDICINE

## 2022-10-04 PROCEDURE — 94729 DIFFUSING CAPACITY: CPT

## 2022-10-05 ENCOUNTER — MED REC SCAN ONLY (OUTPATIENT)
Dept: FAMILY MEDICINE CLINIC | Facility: CLINIC | Age: 62
End: 2022-10-05

## 2022-10-14 NOTE — PROCEDURES
Patient is a 53-year-old patient being assessed with a diagnosis of dyspnea  Results---FEV1 is 1.45 L 46% of predicted with an FVC of 2.39 L 59% of predicted for a ratio of 61%  MVV is severely reduced at 41% of predicted  Total lung capacity 6.2 L normal at 95% with a residual volume of 3.41 L hyperinflated at 143% of predicted  Diffusion capacity is normal at 90% of predicted corrects to 112% of predicted    Impression-  Moderate airways obstruction with a significant bronchodilator response at 31% change.   Suggestive of an asthmatic component  Lung volumes are compatible with mild hyperinflation of the residual volume  Diffusion capacity is preserved  Reduced MVV-suggest clinical correlation to rule out component of neuromuscular limitation    No prior PFTs available

## 2022-10-23 ENCOUNTER — PATIENT MESSAGE (OUTPATIENT)
Dept: FAMILY MEDICINE CLINIC | Facility: CLINIC | Age: 62
End: 2022-10-23

## 2022-10-25 NOTE — TELEPHONE ENCOUNTER
From: Balaji Hillman  To: Alice Godinez DO  Sent: 10/23/2022 10:33 AM CDT  Subject: flu shot    Please note on my chart that I received a Flu vaccine on 10/23/2022 at [facility]. Also regarding my Covid booster, my last booster was 6/17/2022. I think I am due but want to know how soon after the Flu vaccine should I get the Covid booster. Thank you!  Romelia Valladares

## 2022-10-31 ENCOUNTER — APPOINTMENT (OUTPATIENT)
Facility: CLINIC | Age: 62
End: 2022-10-31
Payer: COMMERCIAL

## 2022-10-31 VITALS
HEART RATE: 68 BPM | DIASTOLIC BLOOD PRESSURE: 80 MMHG | HEIGHT: 67 IN | RESPIRATION RATE: 16 BRPM | BODY MASS INDEX: 34.06 KG/M2 | OXYGEN SATURATION: 97 % | WEIGHT: 217 LBS | SYSTOLIC BLOOD PRESSURE: 128 MMHG

## 2022-10-31 DIAGNOSIS — R91.8 PULMONARY INFILTRATES: ICD-10-CM

## 2022-10-31 DIAGNOSIS — J44.9 CHRONIC OBSTRUCTIVE PULMONARY DISEASE, UNSPECIFIED COPD TYPE (HCC): Primary | ICD-10-CM

## 2022-10-31 DIAGNOSIS — R91.1 PULMONARY NODULE: ICD-10-CM

## 2022-10-31 DIAGNOSIS — I26.99 PULMONARY EMBOLISM, UNSPECIFIED CHRONICITY, UNSPECIFIED PULMONARY EMBOLISM TYPE, UNSPECIFIED WHETHER ACUTE COR PULMONALE PRESENT (HCC): ICD-10-CM

## 2022-10-31 PROCEDURE — 3079F DIAST BP 80-89 MM HG: CPT | Performed by: INTERNAL MEDICINE

## 2022-10-31 PROCEDURE — 3074F SYST BP LT 130 MM HG: CPT | Performed by: INTERNAL MEDICINE

## 2022-10-31 PROCEDURE — 3008F BODY MASS INDEX DOCD: CPT | Performed by: INTERNAL MEDICINE

## 2022-10-31 PROCEDURE — 99214 OFFICE O/P EST MOD 30 MIN: CPT | Performed by: INTERNAL MEDICINE

## 2022-10-31 NOTE — PATIENT INSTRUCTIONS
Your CT scan looks good and the spots have cleared. You have one tiny pulmonary nodule in the left lung. Guidelines do not require a repeat CT scan but it is an \"optional\" CT in 12 months. If your breathing changes (short of breath, cough, wheeze), let me know and we can start an inhaler. We also reviewed your blood thinner -  you have completed the minimum of 3months of blood anticoagulation. We could consider extending it to complete 6months as there is still a slight increased risk of recurrence and we ideally would complete 6 months of anticoagulation (end of December). Let me know if you want to complete the 6months of apixaban.

## 2022-11-11 ENCOUNTER — LAB ENCOUNTER (OUTPATIENT)
Dept: LAB | Age: 62
End: 2022-11-11
Attending: Other
Payer: COMMERCIAL

## 2022-11-11 DIAGNOSIS — Z01.818 PREOPERATIVE EXAMINATION, UNSPECIFIED: ICD-10-CM

## 2022-11-11 DIAGNOSIS — Z11.59 SCREENING FOR VIRAL DISEASE: ICD-10-CM

## 2022-11-12 LAB — SARS-COV-2 RNA RESP QL NAA+PROBE: NOT DETECTED

## 2022-11-14 ENCOUNTER — OFFICE VISIT (OUTPATIENT)
Dept: SLEEP CENTER | Age: 62
End: 2022-11-14
Attending: Other
Payer: COMMERCIAL

## 2022-11-14 DIAGNOSIS — G47.33 OSA (OBSTRUCTIVE SLEEP APNEA): ICD-10-CM

## 2022-11-14 PROCEDURE — 95811 POLYSOM 6/>YRS CPAP 4/> PARM: CPT

## 2022-11-14 RX ORDER — ATENOLOL 25 MG/1
TABLET ORAL
Qty: 90 TABLET | Refills: 0 | Status: SHIPPED | OUTPATIENT
Start: 2022-11-14

## 2022-11-22 ENCOUNTER — OFFICE VISIT (OUTPATIENT)
Dept: FAMILY MEDICINE CLINIC | Facility: CLINIC | Age: 62
End: 2022-11-22
Payer: COMMERCIAL

## 2022-11-22 VITALS
TEMPERATURE: 98 F | BODY MASS INDEX: 33.6 KG/M2 | WEIGHT: 216.63 LBS | DIASTOLIC BLOOD PRESSURE: 76 MMHG | OXYGEN SATURATION: 97 % | HEART RATE: 62 BPM | HEIGHT: 67.32 IN | RESPIRATION RATE: 18 BRPM | SYSTOLIC BLOOD PRESSURE: 118 MMHG

## 2022-11-22 DIAGNOSIS — Z12.11 SCREEN FOR COLON CANCER: ICD-10-CM

## 2022-11-22 DIAGNOSIS — Z13.21 SCREENING FOR ENDOCRINE, NUTRITIONAL, METABOLIC AND IMMUNITY DISORDER: ICD-10-CM

## 2022-11-22 DIAGNOSIS — R74.8 ELEVATED LIVER ENZYMES: ICD-10-CM

## 2022-11-22 DIAGNOSIS — Z13.228 SCREENING FOR ENDOCRINE, NUTRITIONAL, METABOLIC AND IMMUNITY DISORDER: ICD-10-CM

## 2022-11-22 DIAGNOSIS — I10 HYPERTENSION, ESSENTIAL: ICD-10-CM

## 2022-11-22 DIAGNOSIS — E53.8 B12 DEFICIENCY: ICD-10-CM

## 2022-11-22 DIAGNOSIS — Z00.00 ANNUAL PHYSICAL EXAM: Primary | ICD-10-CM

## 2022-11-22 DIAGNOSIS — Z13.0 SCREENING FOR ENDOCRINE, NUTRITIONAL, METABOLIC AND IMMUNITY DISORDER: ICD-10-CM

## 2022-11-22 DIAGNOSIS — R73.03 PREDIABETES: ICD-10-CM

## 2022-11-22 DIAGNOSIS — Z13.29 SCREENING FOR ENDOCRINE, NUTRITIONAL, METABOLIC AND IMMUNITY DISORDER: ICD-10-CM

## 2022-11-22 DIAGNOSIS — G47.33 OSA (OBSTRUCTIVE SLEEP APNEA): ICD-10-CM

## 2022-11-22 DIAGNOSIS — E78.00 PURE HYPERCHOLESTEROLEMIA: ICD-10-CM

## 2022-11-22 PROBLEM — U07.1 COVID-19: Status: RESOLVED | Noted: 2022-06-22 | Resolved: 2022-11-22

## 2022-11-22 PROBLEM — N17.9 AKI (ACUTE KIDNEY INJURY) (HCC): Status: RESOLVED | Noted: 2022-05-18 | Resolved: 2022-11-22

## 2022-11-22 PROBLEM — I26.94 MULTIPLE SUBSEGMENTAL PULMONARY EMBOLI WITHOUT ACUTE COR PULMONALE (HCC): Status: RESOLVED | Noted: 2022-06-22 | Resolved: 2022-11-22

## 2022-11-22 PROBLEM — R79.89 ELEVATED D-DIMER: Status: RESOLVED | Noted: 2022-05-18 | Resolved: 2022-11-22

## 2022-11-22 PROBLEM — N17.9 AKI (ACUTE KIDNEY INJURY): Status: RESOLVED | Noted: 2022-05-18 | Resolved: 2022-11-22

## 2022-11-22 PROCEDURE — 99396 PREV VISIT EST AGE 40-64: CPT | Performed by: FAMILY MEDICINE

## 2022-11-22 PROCEDURE — 3078F DIAST BP <80 MM HG: CPT | Performed by: FAMILY MEDICINE

## 2022-11-22 PROCEDURE — 3008F BODY MASS INDEX DOCD: CPT | Performed by: FAMILY MEDICINE

## 2022-11-22 PROCEDURE — 3074F SYST BP LT 130 MM HG: CPT | Performed by: FAMILY MEDICINE

## 2022-11-22 RX ORDER — ATENOLOL 25 MG/1
25 TABLET ORAL DAILY
Qty: 90 TABLET | Refills: 1 | Status: SHIPPED | OUTPATIENT
Start: 2022-11-22

## 2022-11-22 NOTE — PATIENT INSTRUCTIONS
Perform labs fasting 8 hours with water or black coffee or or black tea diet  soda only prior to exam.    -Encourage healthy diet of whole food and avoid processed food and sugary drinks and sodas. Diet should include lean meats and vegetables including 5-7 servings of fruit and vegetables total in 1 day. Never skip breakfast.  -Encouraged exercise 30 minutes or more 5 times weekly to prevent obesity and chronic disease and eliminate stress and its effect on the body. Total 150mins weekly or more. -encouraged to continue not smoking  - recommend condom use per CDC recommendation for all  or unmarried couples  -  immunizations- annual influenza vaccine recommended  -Vitamin D3 1000- 2000 units daily recommended  -Needs 1000mg of calcium daily for osteoporosis prevention discussed. Need to ingest 1000mg of calcium daily to prevent osteoporosis later in life. I.e. one 8 ounce glass of silk [place] milk has 450 mg of calcium and label states 45%. Labels list calcium percentages not milligrams. To calculate milligrams per serving remove the percentage and add a zero (0).  I.e.  9% calcium equals 90 mg

## 2022-11-27 PROBLEM — E78.00 PURE HYPERCHOLESTEROLEMIA: Status: ACTIVE | Noted: 2022-11-27

## 2022-11-27 LAB
ABSOLUTE BASOPHILS: 100 CELLS/UL (ref 0–200)
ABSOLUTE EOSINOPHILS: 439 CELLS/UL (ref 15–500)
ABSOLUTE LYMPHOCYTES: 3080 CELLS/UL (ref 850–3900)
ABSOLUTE MONOCYTES: 701 CELLS/UL (ref 200–950)
ABSOLUTE NEUTROPHILS: 3380 CELLS/UL (ref 1500–7800)
ALBUMIN/GLOBULIN RATIO: 1.4 (CALC) (ref 1–2.5)
ALBUMIN: 4.1 G/DL (ref 3.6–5.1)
ALKALINE PHOSPHATASE: 68 U/L (ref 35–144)
ALT: 14 U/L (ref 9–46)
AST: 16 U/L (ref 10–35)
BASOPHILS: 1.3 %
BILIRUBIN, TOTAL: 0.6 MG/DL (ref 0.2–1.2)
BUN: 24 MG/DL (ref 7–25)
CALCIUM: 9.3 MG/DL (ref 8.6–10.3)
CARBON DIOXIDE: 24 MMOL/L (ref 20–32)
CHLORIDE: 109 MMOL/L (ref 98–110)
CHOL/HDLC RATIO: 3.2 (CALC)
CHOLESTEROL, TOTAL: 208 MG/DL
CREATININE: 0.92 MG/DL (ref 0.7–1.35)
EGFR: 93 ML/MIN/1.73M2
EOSINOPHILS: 5.7 %
GLOBULIN: 2.9 G/DL (CALC) (ref 1.9–3.7)
GLUCOSE: 94 MG/DL (ref 65–99)
HDL CHOLESTEROL: 65 MG/DL
HEMATOCRIT: 48.9 % (ref 38.5–50)
HEMOGLOBIN A1C: 5.7 % OF TOTAL HGB
HEMOGLOBIN: 16.2 G/DL (ref 13.2–17.1)
LDL-CHOLESTEROL: 117 MG/DL (CALC)
LYMPHOCYTES: 40 %
MCH: 29.1 PG (ref 27–33)
MCHC: 33.1 G/DL (ref 32–36)
MCV: 87.8 FL (ref 80–100)
MONOCYTES: 9.1 %
MPV: 10.8 FL (ref 7.5–12.5)
NEUTROPHILS: 43.9 %
NON-HDL CHOLESTEROL: 143 MG/DL (CALC)
PLATELET COUNT: 310 THOUSAND/UL (ref 140–400)
POTASSIUM: 4.3 MMOL/L (ref 3.5–5.3)
PROTEIN, TOTAL: 7 G/DL (ref 6.1–8.1)
RDW: 13.1 % (ref 11–15)
RED BLOOD CELL COUNT: 5.57 MILLION/UL (ref 4.2–5.8)
SODIUM: 142 MMOL/L (ref 135–146)
TRIGLYCERIDES: 146 MG/DL
TSH W/REFLEX TO FT4: 2.52 MIU/L (ref 0.4–4.5)
VITAMIN B12: 344 PG/ML (ref 200–1100)
WHITE BLOOD CELL COUNT: 7.7 THOUSAND/UL (ref 3.8–10.8)

## 2022-11-29 ENCOUNTER — PATIENT MESSAGE (OUTPATIENT)
Dept: FAMILY MEDICINE CLINIC | Facility: CLINIC | Age: 62
End: 2022-11-29

## 2022-11-30 NOTE — TELEPHONE ENCOUNTER
From: Jeremy Peace  To: Juana Prakash DO  Sent: 11/29/2022 6:14 PM CST  Subject: update vaccine     Please update my chart to show that I received a Covid booster on 11/22/2022.  Thank you, Rocio Mcmullen

## 2022-12-05 NOTE — PROGRESS NOTES
Fly Hilton,    I have reviewed your test results. . Your stool screening test for colon cancer is negative. Recommend repeat in 1 year. Please call our office if you have any questions.     Sincerely,  Connie Hooks, DO

## 2022-12-06 DIAGNOSIS — G47.33 OSA (OBSTRUCTIVE SLEEP APNEA): Primary | ICD-10-CM

## 2022-12-09 ENCOUNTER — SLEEP STUDY (OUTPATIENT)
Facility: CLINIC | Age: 62
End: 2022-12-09
Payer: COMMERCIAL

## 2022-12-09 DIAGNOSIS — G47.33 OBSTRUCTIVE SLEEP APNEA SYNDROME: Primary | ICD-10-CM

## 2022-12-09 PROCEDURE — 95811 POLYSOM 6/>YRS CPAP 4/> PARM: CPT | Performed by: OTHER

## 2022-12-20 ENCOUNTER — OFFICE VISIT (OUTPATIENT)
Facility: CLINIC | Age: 62
End: 2022-12-20
Payer: COMMERCIAL

## 2022-12-20 VITALS
RESPIRATION RATE: 16 BRPM | HEIGHT: 67 IN | WEIGHT: 217 LBS | SYSTOLIC BLOOD PRESSURE: 108 MMHG | BODY MASS INDEX: 34.06 KG/M2 | HEART RATE: 68 BPM | OXYGEN SATURATION: 98 % | DIASTOLIC BLOOD PRESSURE: 64 MMHG

## 2022-12-20 DIAGNOSIS — J96.01 ACUTE HYPOXEMIC RESPIRATORY FAILURE (HCC): ICD-10-CM

## 2022-12-20 DIAGNOSIS — I50.31 ACUTE DIASTOLIC CONGESTIVE HEART FAILURE (HCC): ICD-10-CM

## 2022-12-20 DIAGNOSIS — G47.33 OSA (OBSTRUCTIVE SLEEP APNEA): Primary | ICD-10-CM

## 2022-12-20 DIAGNOSIS — G57.93 NEUROPATHY INVOLVING BOTH LOWER EXTREMITIES: ICD-10-CM

## 2022-12-20 PROCEDURE — 3078F DIAST BP <80 MM HG: CPT | Performed by: OTHER

## 2022-12-20 PROCEDURE — 3008F BODY MASS INDEX DOCD: CPT | Performed by: OTHER

## 2022-12-20 PROCEDURE — 99204 OFFICE O/P NEW MOD 45 MIN: CPT | Performed by: OTHER

## 2022-12-20 PROCEDURE — 3074F SYST BP LT 130 MM HG: CPT | Performed by: OTHER

## 2022-12-20 NOTE — PATIENT INSTRUCTIONS
Please be advised:   Do not drive while sleepy   Take CPAP/BiPAP machine to any procedure that requires sedation     When should I clean my machine & supplies? Clean mask cushions or nasal pillow, headgear, tubing, and humidifier chamber with mild soap (Amanda) and water   If water chamber has hard water buildup (white crust), soak in warm water & vinegar mix 50/50. Rinse and hang dry     DAILY   Wipe mask cushions or nasal pillow   Empty & rinse water chamber- refill with distilled water     WEEKLY   Clean mask cushions or nasal pillow, headgear, tubing, and humidifier chamber with mild soap (Amanda) and water   If water chamber has hard water buildup (white crust), soak in warm water & vinegar mix 50/50. Rinse and hang dry     When should supplies be replaced? Contact your home care company for replacement supplies, or if your machine is malfunctioning   *Below is a general guideline of what we recommend. Replacement of supplies differs depending on your insurance company*   MONTHLY: Replace filter and mask cushion   6 MONTHS: Replace headgear and tubing     Travel Tips   Keep CPAP/BiPAP in original bag when traveling, and place luggage tag on bag   Most airlines consider CPAP/BiPAP to be a medical device, therefore it is a free carry-on item   If unable to get distilled water, bottled water is safe while traveling.  DO NOT use tap water   When traveling outside the [place] only a power adapter is necessary (CPAP can operate without a converter), bring an extension cord   Consider purchasing or renting a travel CPAP (not covered by insurance)     Dry Mouth/Nose   Turn up the humidity on your machine (select \"Options\" from the home screen)   Place a cool mist humidifier at your bedside   Over-the-counter remedies: Biotene, XyliMelts, NasoGel     Air Leak   Try adjusting your mask/headgear while laying in sleeping position vs. sitting up   Wash and dry your face prior to putting your mask on   If applicable: shave facial hair at night (or before wearing CPAP)   Purchase \"RemZzzs\" (through home care co., [employer], or remzzzs. com)   100% cotton knit barrier that goes between your mask cushion and your skin   Replace your mask cushion (at least once per month) and/or headgear (every 3-6 months)     Nasal Congestion   CPAP therapy can cause nasal passages to dry out, & mucous membranes try to protect the nasal passages by producing excess mucous, so congestion results. Over-the counter remedies: Flonase, Nasacort, Sinus Rinses (Neti-Pot), DO NOT USE Afrin   Try a mask that goes over the nose and mouth     Skin Irritation   Clean supplies regularly (Citrus II Mask Wipes, Control III disinfectant solution)   Try over the counter creams such as hydrocortisone 1% (apply in the morning after showering)   Your headgear may be too tight, replace supplies so you don't need to adjust so tightly   Try RemZzzs (100% cotton knit barrier that goes between your mask cushion and your skin)     Gas/Bloating   Try a different sleeping position to keep air out of the stomach. Lay on the left side or rotate to the right side. Incline with pillows or lay flat.    Over-the-counter remedies: Simethicone

## 2023-03-10 NOTE — PROGRESS NOTES
Izabel Mins  2022 - 2023  Patient ID: [id number]  : 1960  Age: 60 years  [address] [facility]  Compliance Report  Usage 2022 - 2023  Usage days 62/70 days (89%)  >= 4 hours 40 days (57%)  < 4 hours 22 days (31%)  Usage hours 317 hours 6 minutes  Average usage (total days) 4 hours 32 minutes  Average usage (days used) 5 hours 7 minutes  Median usage (days used) 5 hours 16 minutes  Total used hours (value since last reset - 2023) 366 hours  AirSense 11 AutoSet  Serial number [id number]  Mode AutoSet  Min Pressure 8 cmH2O  Max Pressure 13 cmH2O  EPR Fulltime  EPR level 3  Response Standard  Therapy  Pressure - cmH2O Median: 8.1 95th percentile: 9.3 Maximum: 10.0  Leaks - L/min Median: 3.9 95th percentile: 14.4 Maximum: 19.9  Events per hour AI: 0.3 HI: 0.1 AHI: 0.4  Apnea Index Central: 0.0 Obstructive: 0.2 Unknown: 0.0  RERA Index 0.0  Cheyne-Castellanos respiration (average duration per night) 0 minutes (0%)  Usage - hours  Printed on 03/10/2023 - [employer] version 4.39.0-4.0 Page 1 of 1    This is a 60year old male who presents with the following symptoms, risk factors, behaviors or other items associated with sleep problems. Sleep Apnea:   current CPAP use  Insomnia:  No data recorded  Restless Leg:  no symptoms or restless legs  Parasomnias:   no symptoms of parasomnias  Daytime Problems:  napping on purpose    The patient's [place] Sleepiness score is 3/24.     DME:  Olesya Masters:  NASAL  NEW MACHINE - 2022

## 2023-03-13 ENCOUNTER — TELEPHONE (OUTPATIENT)
Facility: CLINIC | Age: 63
End: 2023-03-13

## 2023-03-13 ENCOUNTER — TELEMEDICINE (OUTPATIENT)
Facility: CLINIC | Age: 63
End: 2023-03-13
Payer: COMMERCIAL

## 2023-03-13 DIAGNOSIS — I50.31 ACUTE DIASTOLIC CONGESTIVE HEART FAILURE (HCC): ICD-10-CM

## 2023-03-13 DIAGNOSIS — G47.33 OSA (OBSTRUCTIVE SLEEP APNEA): Primary | ICD-10-CM

## 2023-03-13 PROCEDURE — 99214 OFFICE O/P EST MOD 30 MIN: CPT | Performed by: OTHER

## 2023-03-13 NOTE — TELEPHONE ENCOUNTER
Mask refit orders sent to [address] [place] [address].        Preston Smith DO  P [facility] Pulmonary Sleep Staff  Switch to nasal mask, please send orders to nilesh

## 2023-03-13 NOTE — PATIENT INSTRUCTIONS
Please be advised:   Do not drive while sleepy   Take CPAP/BiPAP machine to any procedure that requires sedation     When should I clean my machine & supplies? Clean mask cushions or nasal pillow, headgear, tubing, and humidifier chamber with mild soap (Amanda) and water   If water chamber has hard water buildup (white crust), soak in warm water & vinegar mix 50/50. Rinse and hang dry     DAILY   Wipe mask cushions or nasal pillow   Empty & rinse water chamber- refill with distilled water     WEEKLY   Clean mask cushions or nasal pillow, headgear, tubing, and humidifier chamber with mild soap (Amanda) and water   If water chamber has hard water buildup (white crust), soak in warm water & vinegar mix 50/50. Rinse and hang dry     When should supplies be replaced? Contact your home care company for replacement supplies, or if your machine is malfunctioning   *Below is a general guideline of what we recommend. Replacement of supplies differs depending on your insurance company*   MONTHLY: Replace filter and mask cushion   6 MONTHS: Replace headgear and tubing     Travel Tips   Keep CPAP/BiPAP in original bag when traveling, and place luggage tag on bag   Most airlines consider CPAP/BiPAP to be a medical device, therefore it is a free carry-on item   If unable to get distilled water, bottled water is safe while traveling.  DO NOT use tap water   When traveling outside the [place] only a power adapter is necessary (CPAP can operate without a converter), bring an extension cord   Consider purchasing or renting a travel CPAP (not covered by insurance)     Dry Mouth/Nose   Turn up the humidity on your machine (select \"Options\" from the home screen)   Place a cool mist humidifier at your bedside   Over-the-counter remedies: Biotene, XyliMelts, NasoGel     Air Leak   Try adjusting your mask/headgear while laying in sleeping position vs. sitting up   Wash and dry your face prior to putting your mask on   If applicable: shave facial hair at night (or before wearing CPAP)   Purchase \"RemZzzs\" (through home care co., [email or web address], or remzzzs. com)   100% cotton knit barrier that goes between your mask cushion and your skin   Replace your mask cushion (at least once per month) and/or headgear (every 3-6 months)     Nasal Congestion   CPAP therapy can cause nasal passages to dry out, & mucous membranes try to protect the nasal passages by producing excess mucous, so congestion results. Over-the counter remedies: Flonase, Nasacort, Sinus Rinses (Neti-Pot), DO NOT USE Afrin   Try a mask that goes over the nose and mouth     Skin Irritation   Clean supplies regularly (Citrus II Mask Wipes, Control III disinfectant solution)   Try over the counter creams such as hydrocortisone 1% (apply in the morning after showering)   Your headgear may be too tight, replace supplies so you don't need to adjust so tightly   Try RemZzzs (100% cotton knit barrier that goes between your mask cushion and your skin)     Gas/Bloating   Try a different sleeping position to keep air out of the stomach. Lay on the left side or rotate to the right side. Incline with pillows or lay flat.    Over-the-counter remedies: Simethicone

## 2023-04-24 ENCOUNTER — TELEPHONE (OUTPATIENT)
Dept: FAMILY MEDICINE CLINIC | Facility: CLINIC | Age: 63
End: 2023-04-24

## 2023-04-24 DIAGNOSIS — U07.1 COVID-19: Primary | ICD-10-CM

## 2023-04-24 RX ORDER — NIRMATRELVIR AND RITONAVIR 300-100 MG
KIT ORAL
Qty: 30 TABLET | Refills: 0 | Status: SHIPPED | OUTPATIENT
Start: 2023-04-24

## 2023-04-24 NOTE — TELEPHONE ENCOUNTER
Pt tested positive for Covid 4/24  Symptoms started on 4/22    Symptoms include cough, congestion, fatigue, headaches. No fever. Advised on home supportive cares - Tylenol/ibuprofen for pain, Mucinex DM 12-hour ER for cough, honey for sore throat, nasal spray and humidifier for congestion, increased rest and hydration, pt verbalized understanding. Advised on [facility] home isolation guidelines:  ? Stay home for five days. ? If you have no symptoms or your symptoms are resolving after five days, you can leave your house. ? Continue to wear a mask around others for five additional days. ? If you have a fever, continue to stay home until your fever resolves. Advised to seek immediate emergency medical attention, if severe SOB, chest pain, dizziness, altered mental status, or severe fatigue. Pt is asking if Dr. Cuco Cerna would prescribe Paxlovid d/t pt being high risk. Routed to provider for review and advice.

## 2023-06-12 ENCOUNTER — OFFICE VISIT (OUTPATIENT)
Dept: FAMILY MEDICINE CLINIC | Facility: CLINIC | Age: 63
End: 2023-06-12
Payer: COMMERCIAL

## 2023-06-12 VITALS
HEART RATE: 60 BPM | BODY MASS INDEX: 35.31 KG/M2 | OXYGEN SATURATION: 94 % | RESPIRATION RATE: 20 BRPM | TEMPERATURE: 97 F | SYSTOLIC BLOOD PRESSURE: 116 MMHG | DIASTOLIC BLOOD PRESSURE: 70 MMHG | HEIGHT: 67 IN | WEIGHT: 225 LBS

## 2023-06-12 DIAGNOSIS — G47.33 OSA (OBSTRUCTIVE SLEEP APNEA): ICD-10-CM

## 2023-06-12 DIAGNOSIS — E78.00 PURE HYPERCHOLESTEROLEMIA: ICD-10-CM

## 2023-06-12 DIAGNOSIS — I10 ESSENTIAL HYPERTENSION: Primary | ICD-10-CM

## 2023-06-12 DIAGNOSIS — Z23 NEED FOR VACCINATION: ICD-10-CM

## 2023-06-12 DIAGNOSIS — R73.03 PREDIABETES: ICD-10-CM

## 2023-06-12 DIAGNOSIS — E53.8 B12 DEFICIENCY: ICD-10-CM

## 2023-06-12 PROBLEM — R74.8 ELEVATED LIVER ENZYMES: Status: RESOLVED | Noted: 2022-06-02 | Resolved: 2023-06-12

## 2023-06-12 PROCEDURE — 99214 OFFICE O/P EST MOD 30 MIN: CPT | Performed by: FAMILY MEDICINE

## 2023-06-12 PROCEDURE — 90677 PCV20 VACCINE IM: CPT | Performed by: FAMILY MEDICINE

## 2023-06-12 PROCEDURE — 3074F SYST BP LT 130 MM HG: CPT | Performed by: FAMILY MEDICINE

## 2023-06-12 PROCEDURE — 90471 IMMUNIZATION ADMIN: CPT | Performed by: FAMILY MEDICINE

## 2023-06-12 PROCEDURE — 3008F BODY MASS INDEX DOCD: CPT | Performed by: FAMILY MEDICINE

## 2023-06-12 PROCEDURE — 3078F DIAST BP <80 MM HG: CPT | Performed by: FAMILY MEDICINE

## 2023-06-12 RX ORDER — ATENOLOL 25 MG/1
25 TABLET ORAL DAILY
Qty: 90 TABLET | Refills: 1 | Status: SHIPPED | OUTPATIENT
Start: 2023-06-12

## 2023-06-12 RX ORDER — ATORVASTATIN CALCIUM 40 MG/1
40 TABLET, FILM COATED ORAL NIGHTLY
Qty: 90 TABLET | Refills: 0 | Status: SHIPPED | OUTPATIENT
Start: 2023-06-12

## 2023-06-14 ENCOUNTER — PATIENT MESSAGE (OUTPATIENT)
Dept: FAMILY MEDICINE CLINIC | Facility: CLINIC | Age: 63
End: 2023-06-14

## 2023-06-14 NOTE — TELEPHONE ENCOUNTER
From: Dick Ramos  To: Candace Dawson DO  Sent: 6/14/2023 8:30 AM CDT  Subject: primary Dr verified    This message is for office girls Rod Mckoy and Luis Eduardo,   I was in office on 6/12 and there was an insurance issue with [employer]. I have verified by phone today with them that Dr. Stacy Real listed with them as my PCP. I spoke with Sophia Isbell at [facility] and he verified this. Thanks!  Manuel James

## 2023-07-15 LAB
ALBUMIN/GLOBULIN RATIO: 1.8 (CALC) (ref 1–2.5)
ALBUMIN: 4 G/DL (ref 3.6–5.1)
ALKALINE PHOSPHATASE: 64 U/L (ref 35–144)
ALT: 26 U/L (ref 9–46)
AST: 23 U/L (ref 10–35)
BILIRUBIN, TOTAL: 0.7 MG/DL (ref 0.2–1.2)
BUN: 20 MG/DL (ref 7–25)
CALCIUM: 9 MG/DL (ref 8.6–10.3)
CARBON DIOXIDE: 25 MMOL/L (ref 20–32)
CHLORIDE: 107 MMOL/L (ref 98–110)
CHOL/HDLC RATIO: 2.5 (CALC)
CHOLESTEROL, TOTAL: 144 MG/DL
CREATININE: 0.88 MG/DL (ref 0.7–1.35)
EGFR: 96 ML/MIN/1.73M2
GLOBULIN: 2.2 G/DL (CALC) (ref 1.9–3.7)
GLUCOSE: 88 MG/DL (ref 65–99)
HDL CHOLESTEROL: 57 MG/DL
HEMOGLOBIN A1C: 5.6 % OF TOTAL HGB
LDL-CHOLESTEROL: 66 MG/DL (CALC)
NON-HDL CHOLESTEROL: 87 MG/DL (CALC)
POTASSIUM: 4.3 MMOL/L (ref 3.5–5.3)
PROTEIN, TOTAL: 6.2 G/DL (ref 6.1–8.1)
SODIUM: 140 MMOL/L (ref 135–146)
TRIGLYCERIDES: 129 MG/DL
VITAMIN B12: 497 PG/ML (ref 200–1100)

## 2023-09-11 RX ORDER — ATORVASTATIN CALCIUM 40 MG/1
40 TABLET, FILM COATED ORAL NIGHTLY
Qty: 90 TABLET | Refills: 0 | Status: SHIPPED | OUTPATIENT
Start: 2023-09-11

## 2023-09-11 NOTE — TELEPHONE ENCOUNTER
[facility] Hospital Medicine  History & Physical    Patient Name: Annalisa Reynolds  MRN: [id number]  Admission Date: 3/9/2018  Attending Physician: Denise Walker MD   Primary Care Provider: No primary care provider on file.    Hospital Medicine Team: [facility] MED 3 Enzo Alvarez MD     Patient information was obtained from patient and ER records.     Subjective:     Principal Problem:Hypertensive emergency    Chief Complaint:   Chief Complaint   Patient presents with    Dizziness     dizziness and nausea.  orthostatic per ems.    Hypertension        HPI: Ms. Reynolds is a 66 woman with history of untreated HTN who presented to the ED with unsteady gait, light-headedness, nausea, and vomiting since late this morning.  Patient reports feeling dizzy at work this morning.  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 She then had an episode of vomiting, so reported to ED via ambulance.  In the ED she received a head CT which showed concern for a age-indeterminate small R pontine infarct.  Stroke team was notified and recommend a follow up MRI which showed no acute findings/infarcts.  She reports her symptoms have improved and she is feeling much better.  She currently denies denies HA, double vision, confusion, speech difficulty, weakness, or numbness/tingling.   She does not take any medications at home. Drinks alcohol occasionally, smokes ~1/2 ppd x 30 plus years.      History reviewed. No pertinent past medical history.    No past surgical history on file.    Review of patient's allergies indicates:  No Known Allergies    No current facility-administered medications on file prior to encounter.      No current outpatient prescriptions on file prior to encounter.     Family History     Problem Relation (Age of Onset)    Cancer Father    Diabetes Father        Social History Main Topics    Smoking status: Not on file    Smokeless  Refill Passed Protocol:     Pt requesting refill of   Requested Prescriptions     Pending Prescriptions Disp Refills    ATORVASTATIN 40 MG Oral Tab [Pharmacy Med Name: ATORVASTATIN 40MG TABLETS] 90 tablet 0     Sig: TAKE 1 TABLET(40 MG) BY MOUTH EVERY NIGHT     Refill was approved and sent to pharmacy:     Last Time Medication was Filled:  6/12/2023    Last Office Visit with Provider: 6/12/2023    Appt. scheduled on 12/28/2023 tobacco: Not on file    Alcohol use Not on file    Drug use: Unknown    Sexual activity: Not on file     Review of Systems   Constitutional: Negative for chills and fever.   HENT: Negative for drooling and mouth sores.    Eyes: Negative for discharge and visual disturbance.   Respiratory: Negative for choking and shortness of breath.    Cardiovascular: Negative for chest pain and leg swelling.   Gastrointestinal: Positive for nausea and vomiting.   Genitourinary: Negative for frequency and hematuria.   Musculoskeletal: Positive for gait problem. Negative for neck stiffness.   Skin: Negative for rash and wound.   Neurological: Positive for dizziness and light-headedness. Negative for facial asymmetry, speech difficulty, weakness and numbness.   Psychiatric/Behavioral: Negative for agitation and confusion.     Objective:     Vital Signs (Most Recent):  Temp: 98.6 °F (37 °C) (03/09/18 1923)  Pulse: (!) 55 (03/09/18 1926)  Resp: 20 (03/09/18 1923)  BP: (!) 162/82 (03/09/18 1926)  SpO2: 100 % (03/09/18 1923) Vital Signs (24h Range):  Temp:  [98 °F (36.7 °C)-98.6 °F (37 °C)] 98.6 °F (37 °C)  Pulse:  [54-63] 55  Resp:  [15-25] 20  SpO2:  [96 %-100 %] 100 %  BP: (147-212)/() 162/82     Weight: 84.9 kg (187 lb 2.7 oz)  Body mass index is 32.13 kg/m².    Physical Exam   Constitutional: She is oriented to person, place, and time. She appears well-developed and well-nourished. No distress.   HENT:   Head: Normocephalic and atraumatic.   Eyes: Conjunctivae and EOM are normal.   Cardiovascular: Normal rate, regular rhythm and intact distal pulses.    No murmur heard.  Pulmonary/Chest: Effort normal. No respiratory distress.   Abdominal: Soft. Bowel sounds are normal. She exhibits no distension. There is no tenderness.   Musculoskeletal: Normal range of motion. She exhibits no edema.   Neurological: She is alert and oriented to person, place, and time. No cranial nerve deficit or sensory deficit.   Skin: Skin is warm and  dry.   Psychiatric: She has a normal mood and affect. Her behavior is normal.         CRANIAL NERVES     CN III, IV, VI   Extraocular motions are normal.        Significant Labs: All pertinent labs within the past 24 hours have been reviewed.  CBC WNL, CMP unremarkable     Significant Imaging: I have reviewed all pertinent imaging results/findings within the past 24 hours.   MRI shows no acute new processes/infarcts     Assessment/Plan:     * Hypertensive emergency    66 year old female with history of untreated HTN who presented with SBPs 200s, dizziness, and assoicated nausea/vomiting.  Improved following nitro past.   Concern for ACS.  BP now 160s, started on amlodipine 10mg in ED, no other sign of end organ damage.   - Will obtain stat troponin's and trend, If significantly elevated consult cards and start heparin drip.     - Start Losartan with plans to add hctz tomorrow.    - PRN hydralazine for BP > 180   - ASA given   - 2D echo tomorrow            Nausea & vomiting    Now improved  Continue PRN zofran           Dizziness    Concern for ACS, Stroke ruled out, ddx includes BPV, Viral, HTN emergency, TIA, or Arrhythmia  - Cardiac monitoring  - Consider US carotids in AM   - BP control           Abnormal CT of brain    Stroke team notified, MRI unremarkable             VTE Risk Mitigation         Ordered     enoxaparin injection 40 mg  Daily     Route:  Subcutaneous        03/09/18 1949     Medium Risk of VTE  Once      03/09/18 1949         Dispo: admit for HTN emergency and BP control.  Avoid fast correction.  Follow up with troponin given concern for ACS.     Enzo Alvarez MD  Department of Hospital Medicine   [facility]

## 2023-09-22 ENCOUNTER — PATIENT MESSAGE (OUTPATIENT)
Dept: FAMILY MEDICINE CLINIC | Facility: CLINIC | Age: 63
End: 2023-09-22

## 2023-09-25 NOTE — TELEPHONE ENCOUNTER
From: Lorri Rivera  To: Tricia Atkinson  Sent: 9/22/2023 5:43 PM CDT  Subject: Vaccines due    Please let me know which vaccines I am due for. I think it is Covid and Flu.  Thank you

## 2023-10-15 ENCOUNTER — PATIENT MESSAGE (OUTPATIENT)
Dept: FAMILY MEDICINE CLINIC | Facility: CLINIC | Age: 63
End: 2023-10-15

## 2023-10-16 NOTE — TELEPHONE ENCOUNTER
From: Trini Xiong  To: Yogesh Feng  Sent: 10/15/2023 6:19 PM CDT  Subject: Update covid and flu vaccines    Please update my chart for covid and flu vaccines. Both received on Oct. 15 2023.

## 2023-10-25 NOTE — CM/SW NOTE
05/19/22 1600   CM/SW Referral Data   Referral Source Physician   Reason for Referral Discharge planning   Informant EMR;Clinical Staff Member     Protocol order written for Becki Hoffman due to resp failure. Pt admitted due to influenza. Per RN, no HHC needs anticipated. Pt resides with wife. Sw to follow.     Yadira Eason LCSW  /Discharge Planner  [phone] 49 yo F with a PMH of ESRD not on HD-MWF, poorly controlled Type 2 DM on insulin, HTN, and sarcoidosis presents with dizziness x this AM. Patient states symptoms are worse with change in position went to hemodialysis today and was noted to be orthostatic sent to the ED.  Patient has had decreased p.o. intake as she is recent dialysis and trying to figure out fluid restriction also had diarrhea for several days the end of last week going over the weekend there is no abdominal pain though symptoms resolved no fevers or chills.  Person here noted to be orthostatic did not get dialyzed today will need to be admitted for dialysis IV fluid bolus given likely due to hypovolemia rule out infectious metabolic cause

## 2023-11-13 ENCOUNTER — PATIENT MESSAGE (OUTPATIENT)
Dept: FAMILY MEDICINE CLINIC | Facility: CLINIC | Age: 63
End: 2023-11-13

## 2023-11-13 NOTE — TELEPHONE ENCOUNTER
From: Mari Oliveira  To: Maria Elena Sinclair  Sent: 11/13/2023 8:32 AM CST  Subject: RSV 11/12/23    Please update my chart for the RSV AREXVY vaccine 11/12/23.  Thank you,

## 2023-12-09 DIAGNOSIS — I10 ESSENTIAL HYPERTENSION: ICD-10-CM

## 2023-12-11 RX ORDER — ATORVASTATIN CALCIUM 40 MG/1
40 TABLET, FILM COATED ORAL NIGHTLY
Qty: 90 TABLET | Refills: 0 | Status: SHIPPED | OUTPATIENT
Start: 2023-12-11

## 2023-12-11 RX ORDER — ATENOLOL 25 MG/1
25 TABLET ORAL DAILY
Qty: 90 TABLET | Refills: 1 | Status: SHIPPED | OUTPATIENT
Start: 2023-12-11

## 2023-12-12 ENCOUNTER — PATIENT MESSAGE (OUTPATIENT)
Dept: FAMILY MEDICINE CLINIC | Facility: CLINIC | Age: 63
End: 2023-12-12

## 2023-12-12 NOTE — TELEPHONE ENCOUNTER
From: Cherelle Bustillo  To: Cira Bertofrancheska  Sent: 12/12/2023 6:49 AM CST  Subject: Cough med recommendations     Hi, I have been sick for a few weeks I've been taking Mucinex maximum strength Dm 12 Hour. I seem to be a bit better throughout the day but at night I. Go into coughing fits. Can recommend or prescribe a cough medication? My wife Brent Wood has the same issues.  Thank you, Millie Ingram

## 2023-12-28 ENCOUNTER — OFFICE VISIT (OUTPATIENT)
Dept: FAMILY MEDICINE CLINIC | Facility: CLINIC | Age: 63
End: 2023-12-28
Payer: COMMERCIAL

## 2023-12-28 VITALS
RESPIRATION RATE: 20 BRPM | HEART RATE: 70 BPM | OXYGEN SATURATION: 95 % | SYSTOLIC BLOOD PRESSURE: 131 MMHG | TEMPERATURE: 98 F | DIASTOLIC BLOOD PRESSURE: 70 MMHG | WEIGHT: 216 LBS | HEIGHT: 67.2 IN | BODY MASS INDEX: 33.51 KG/M2

## 2023-12-28 DIAGNOSIS — Z00.00 ANNUAL PHYSICAL EXAM: Primary | ICD-10-CM

## 2023-12-28 DIAGNOSIS — R73.03 PREDIABETES: ICD-10-CM

## 2023-12-28 DIAGNOSIS — Z23 NEED FOR VACCINATION: ICD-10-CM

## 2023-12-28 DIAGNOSIS — I10 ESSENTIAL HYPERTENSION: ICD-10-CM

## 2023-12-28 DIAGNOSIS — Z12.5 SCREENING FOR PROSTATE CANCER: ICD-10-CM

## 2023-12-28 DIAGNOSIS — Z13.0 SCREENING FOR ENDOCRINE, NUTRITIONAL, METABOLIC AND IMMUNITY DISORDER: ICD-10-CM

## 2023-12-28 DIAGNOSIS — E78.00 PURE HYPERCHOLESTEROLEMIA: ICD-10-CM

## 2023-12-28 DIAGNOSIS — Z13.29 SCREENING FOR ENDOCRINE, NUTRITIONAL, METABOLIC AND IMMUNITY DISORDER: ICD-10-CM

## 2023-12-28 DIAGNOSIS — Z12.11 SCREEN FOR COLON CANCER: ICD-10-CM

## 2023-12-28 DIAGNOSIS — G47.33 OSA ON CPAP: ICD-10-CM

## 2023-12-28 DIAGNOSIS — Z13.21 SCREENING FOR ENDOCRINE, NUTRITIONAL, METABOLIC AND IMMUNITY DISORDER: ICD-10-CM

## 2023-12-28 DIAGNOSIS — E53.8 B12 DEFICIENCY: ICD-10-CM

## 2023-12-28 DIAGNOSIS — Z13.228 SCREENING FOR ENDOCRINE, NUTRITIONAL, METABOLIC AND IMMUNITY DISORDER: ICD-10-CM

## 2023-12-28 PROCEDURE — 90471 IMMUNIZATION ADMIN: CPT | Performed by: FAMILY MEDICINE

## 2023-12-28 PROCEDURE — 90636 HEP A/HEP B VACC ADULT IM: CPT | Performed by: FAMILY MEDICINE

## 2023-12-28 PROCEDURE — 3075F SYST BP GE 130 - 139MM HG: CPT | Performed by: FAMILY MEDICINE

## 2023-12-28 PROCEDURE — 3008F BODY MASS INDEX DOCD: CPT | Performed by: FAMILY MEDICINE

## 2023-12-28 PROCEDURE — 3078F DIAST BP <80 MM HG: CPT | Performed by: FAMILY MEDICINE

## 2023-12-28 PROCEDURE — 99396 PREV VISIT EST AGE 40-64: CPT | Performed by: FAMILY MEDICINE

## 2023-12-28 RX ORDER — ATORVASTATIN CALCIUM 40 MG/1
40 TABLET, FILM COATED ORAL NIGHTLY
Qty: 90 TABLET | Refills: 1 | Status: SHIPPED | OUTPATIENT
Start: 2023-12-28

## 2023-12-28 NOTE — PATIENT INSTRUCTIONS
Perform labs fasting 8 hours with water or black coffee or or black tea diet  soda only prior to exam.    -Encourage healthy diet of whole food and avoid processed food and sugary drinks and sodas. Diet should include lean meats and vegetables including 5-7 servings of fruit and vegetables total in 1 day. Never skip breakfast.  -Encouraged exercise 30 minutes or more 5 times weekly to prevent obesity and chronic disease and eliminate stress and its effect on the body. Total 150mins weekly or more. -encouraged to continue not smoking  - recommend condom use per CDC recommendation for all  or unmarried couples  -  immunizations- annual influenza vaccine recommended  -Vitamin D3 1000- 2000 units daily recommended  -Needs 1000mg of calcium daily for osteoporosis prevention discussed. Need to ingest 1000mg of calcium daily to prevent osteoporosis later in life. I.e. one 8 ounce glass of silk [place] milk has 450 mg of calcium and label states 45%. Labels list calcium percentages not milligrams. To calculate milligrams per serving remove the percentage and add a zero (0).  I.e.  9% calcium equals 90 mg

## 2024-01-10 ENCOUNTER — TELEPHONE (OUTPATIENT)
Dept: FAMILY MEDICINE CLINIC | Facility: CLINIC | Age: 64
End: 2024-01-10

## 2024-01-10 DIAGNOSIS — Z01.84 IMMUNITY STATUS TESTING: Primary | ICD-10-CM

## 2024-01-10 NOTE — TELEPHONE ENCOUNTER
Spouse called, states the plasma donation center positive for Hep B. They were advised to test for Hep B.    Spouse reports pt has Hep A/B vaccine on 12/2/2023.    Lab order pended for provider review and signature.

## 2024-01-10 NOTE — TELEPHONE ENCOUNTER
Spouse called, states the plasma center told her that pt might have tested positive because he was vaccinated on 12/28/2023 and he donated plasma on 12/30/2023.    They state that spouse can go back to donating, if she brings letter stating pt received the vaccine on 12/28/2023.    Letter pended for provider review and signature.

## 2024-01-10 NOTE — TELEPHONE ENCOUNTER
Pt needs OV for letter, proof of immunization of hep A and B and further testing will be ordered to confirm hep B status.  Please inform

## 2024-01-15 NOTE — TELEPHONE ENCOUNTER
Patient wife was in to see Dr Pineda and she said that she would put a order for a Hep B test in.

## 2024-01-15 NOTE — TELEPHONE ENCOUNTER
Order signed.  Patient to schedule follow-up with me to review results.  He needs to complete the labs next week then follow-up in 2 weeks.    Thank you

## 2024-01-27 LAB — HEPATITIS B SURFACE$ANTIBODY (QUANT): <5 MIU/ML

## 2024-01-30 ENCOUNTER — OFFICE VISIT (OUTPATIENT)
Dept: FAMILY MEDICINE CLINIC | Facility: CLINIC | Age: 64
End: 2024-01-30
Payer: COMMERCIAL

## 2024-01-30 VITALS
HEART RATE: 60 BPM | HEIGHT: 67 IN | SYSTOLIC BLOOD PRESSURE: 128 MMHG | DIASTOLIC BLOOD PRESSURE: 76 MMHG | BODY MASS INDEX: 33.8 KG/M2 | TEMPERATURE: 97 F | OXYGEN SATURATION: 96 % | RESPIRATION RATE: 18 BRPM | WEIGHT: 215.38 LBS

## 2024-01-30 DIAGNOSIS — R89.9 ABNORMAL LABORATORY TEST RESULT: Primary | ICD-10-CM

## 2024-01-30 PROCEDURE — 99213 OFFICE O/P EST LOW 20 MIN: CPT | Performed by: FAMILY MEDICINE

## 2024-01-30 PROCEDURE — 3078F DIAST BP <80 MM HG: CPT | Performed by: FAMILY MEDICINE

## 2024-01-30 PROCEDURE — 3074F SYST BP LT 130 MM HG: CPT | Performed by: FAMILY MEDICINE

## 2024-01-30 PROCEDURE — 3008F BODY MASS INDEX DOCD: CPT | Performed by: FAMILY MEDICINE

## 2024-01-30 NOTE — PROGRESS NOTES
CHIEF COMPLAINT:   Chief Complaint   Patient presents with    Follow - Up     Discuss labs results      HPI:     Fernie Pop is a 63 year old male presents for discuss lab work.  Patient had hepatitis A vaccine and annual physical in December and 2 weeks later went to donate plasma and tested positive for hepatitis B antigen.  Was told there could be a cross-reactivity from receiving the vaccine but needed to be cleared by his physician.  Patient has no history of IV drug use.  He is  and monogamous.  No history of blood transfusions.  Denies any abdominal pain or jaundice.  Denies any fever fatigue feels otherwise well.  Completed hepatitis B lab work and needs letter stating that he has not contracted the disease.      HISTORY:  Past Medical History:   Diagnosis Date    PAMELA (acute kidney injury) (HCC) 2022    COPD (chronic obstructive pulmonary disease) (HCC) 2022    diagnosed by Dr. Isaac VIVAS-19 2022    Essential hypertension     Multiple subsegmental pulmonary emboli without acute cor pulmonale (HCC) 2022    Sleep apnea       Past Surgical History:   Procedure Laterality Date    COLONOSCOPY        Family History   Problem Relation Age of Onset    Cancer Father 85        in blood    Cancer Mother 60        mouth, tongue    No Known Problems Daughter     Breast Cancer Sister 55        breast cancer     Cancer Brother         liver    Diabetes Sister 50        morbid obesity, diabetes    Obesity Sister     No Known Problems Brother     Cancer Sister         breast cancer      Social History:   Social History     Socioeconomic History    Marital status:    Tobacco Use    Smoking status: Former     Packs/day: 1.00     Years: 15.00     Additional pack years: 0.00     Total pack years: 15.00     Types: Cigarettes     Quit date: 1990     Years since quittin.4    Smokeless tobacco: Never    Tobacco comments:     quit smoking    Vaping Use    Vaping  Use: Never used   Substance and Sexual Activity    Alcohol use: Yes     Alcohol/week: 4.0 - 6.0 standard drinks of alcohol     Types: 4 - 6 Cans of beer per week     Comment: 2 drinks daily    Drug use: Never   Other Topics Concern    Caffeine Concern No    Exercise No    Seat Belt No    Special Diet No    Stress Concern No    Weight Concern Yes        Medications (Active prior to today's visit):  Current Outpatient Medications   Medication Sig Dispense Refill    atorvastatin 40 MG Oral Tab Take 1 tablet (40 mg total) by mouth nightly. 90 tablet 1    atenolol 25 MG Oral Tab Take 1 tablet (25 mg total) by mouth daily. 90 tablet 1       Allergies:  No Known Allergies    PSFH elements reviewed from today and agreed except as otherwise stated in HPI.  PHYSICAL EXAM:   /76 (BP Location: Right arm, Patient Position: Sitting, Cuff Size: adult)   Pulse 60   Temp 97.2 °F (36.2 °C) (Temporal)   Resp 18   Ht 5' 7\" (1.702 m)   Wt 215 lb 6.4 oz (97.7 kg)   SpO2 96%   BMI 33.74 kg/m²   Vital signs reviewed.Appears stated age, well groomed.  Physical Exam   General: Well-nourished, well hydrated. No acute distress. No pallor. No tachypnea. Non toxic.  HEENT: normocephaly, atraumatic.  Sclera clear and non icteric bilaterally.  Neck: supple. No adenopathy. No thyromegaly.   Heart: RRR without S3 or S4 murmur . Clear S1S2  Lungs: clear to auscultation bilaterally. No rales, rhonchi or wheezes. Good inspiratory and expiratory effort  Abdomen: soft, nontender, nondistended. NL bowel sounds.  No HSM.  Extremities: No cyanosis, clubbing, or edema bilaterally.   Skin: no acute rashes  Neuro: AOx3.  Normal gait    LABS     Telephone on 01/10/2024   Component Date Value    HEPATITIS B SURFACE$ANTI* 01/26/2024 <5 (L)     HEPATITIS B SURFACE$ANTI* 01/26/2024 NON-REACTIVE     ollected 1/2/2024  7:30 AM       Status: Final result        Component  Ref Range & Units 1/2/24  7:30 AM   ColoGuard Test Result  Negative Negative    Comment:  NEGATIVE TEST RESULT. A negative Cologuard result indicates a low likelihood that a colorectal cancer (CRC) or advanced adenoma (adenomatous polyps with more advanced pre-malignant features)  is present. The chance that a person with a negative Cologuard test has a colorectal cancer is less than 1 in 1500 (negative predictive value >99.9%) or has an  advanced adenoma is less than  5.3% (negative predictive value 94.7%). These data are based on a prospective cross-sectional study of 10,000 individuals at average risk for colorectal cancer who were screened with both Cologuard and colonoscopy. (Jude Betancur al, N Engl J Med 2014;370(14):1636-8035) The normal value (reference range) for this assay is negative.    COLOGUARD RE-SCREENING RECOMMENDATION: Periodic colorectal cancer screening is an important part of preventive healthcare for asymptomatic individuals at average risk for colorectal cancer.  Following a negative Cologuard result, the American Cancer Society and U.S. Multi-Society Task Force screening guidelines recommend a Cologuard re-screening interval of 3 years.  References: American Cancer Society Guideline for Colorectal Cancer Screening: https://www.cancer.org/cancer/colon-rectal-cancer/ujkffbvge-dieyhdldb-yngtlgo/acs-recommendations.html.; Reed DK, Yao CR, Irvin RAYOK, Colorectal Cancer Screening: Recommendations for Physicians and Patients from the U.S. Multi-Society Task Force on Colorectal Cancer Screening , Am J Gastroenterology 2017; 112:8153-2926.    TEST DESCRIPTION: Composite algorithmic analysis of stool DNA-biomarkers with hemoglobin immunoassay.   Quantitative values of individual biomarkers are not reportable and are not associated with individual biomarker result reference ranges. Cologuard is intended for colorectal cancer screening of adults of either sex, 45 years or older, who are at average-risk for colorectal cancer (CRC). Cologuard has been approved for use by the  U.S. FDA. The performance of Cologuard was established in a cross sectional study of average-risk adults aged 50-84. Cologuard performance in patients ages 45 to 49 years was estimated by sub-group analysis of near-age groups. Colonoscopies performed for a positive result may find as the most clinically significant lesion: colorectal cancer [4.0%], advanced adenoma (including sessile serrated polyps greater than or equal to 1cm diameter) [20%] or non- advanced adenoma [31%]; or no colorectal neoplasia [45%]. These estimates are derived from a prospective  cross-sectional screening study of 10,000 individuals at average risk for colorectal cancer who were screened with both Cologuard and colonoscopy. (Jude HOOKS. et al, N Engl J Med 2014;370(14):2331-7634.) Cologuard may produce a false negative or false positive result (no colorectal cancer or precancerous polyp present at colonoscopy follow up). A negative Cologuard test result does not guarantee the absence of CRC or advanced adenoma (pre-cancer). The current Cologuard screening interval is every 3 years. (American Cancer Society and U.S. Multi-Society Task Force). Cologuard performance data in a 10,000 patient pivotal study using colonoscopy as the reference method can be accessed at the following location: www.[employer]/results. Additional description of the Cologuard test process, warnings and precautions can be found at www.[employer].com.   Resulting Agency [facility] (CLIA #:[id number])              Specimen Collected: 01/02/24  7:30 AM    Performed By: [facility] (CLIA #:[id number]) Last Resulted: 01/06/24  2:01 AM   Received From: [facility]  Result Received: 01/09/24  4:26 PM        Lab Flowsheet        Order Details        View Encounter        Lab and Collection Details        Received Information        Result History     View All Conversations on this Encounter                REVIEWED THIS  VISIT  ASSESSMENT/PLAN:   63 year old male with    1. Abnormal laboratory test result  Pt hep B antigen is negative and Hep B antibody is negative  Pt does not have hep B infection  Letter written stating patient does not have hepatitis B infection with proof of lab results  Letter also faxed to health department attention Megan.    Time spent with patient, writing letters and faxing 20 minutes +5 minutes writing note for total of 25 minutes    Meds This Visit:  Requested Prescriptions      No prescriptions requested or ordered in this encounter       Health Maintenance:  Health Maintenance   Topic Date Due    PSA  08/26/2023    Annual Physical  12/28/2024    Colorectal Cancer Screening  01/02/2027    DTaP,Tdap,and Td Vaccines (2 - Td or Tdap) 10/13/2027    Influenza Vaccine  Completed    Annual Depression Screening  Completed    Pneumococcal Vaccine: Birth to 64yrs  Completed    Zoster Vaccines  Completed    COVID-19 Vaccine  Completed         Patient/Caregiver Education: Patient/Caregiver Education: There are no barriers to learning. Medical education done.   Outcome: Patient verbalizes understanding. Patient is notified to call with any questions, comp lications, allergies, or worsening or changing symptoms.  Patient is to call with any side effects or complications from the treatments as a result of today.     Problem List:     Patient Active Problem List   Diagnosis    Numbness and tingling of both legs below knees    Neuralgia of lower extremity    BMI 33.0-33.9,adult    B12 deficiency    Hemorrhage of rectum and anus    Old anteroseptal myocardial infarction    Abnormal resting ECG findings    Sinus tachycardia    Essential hypertension    Acute hypoxemic respiratory failure (HCC)    Influenza A    Sepsis with acute hypoxic respiratory failure without septic shock, due to unspecified organism (HCC)    Neuropathy involving both lower extremities    History of influenza    History of respiratory failure     Acute respiratory failure with hypoxia (HCC)    Acute bronchiolitis due to human metapneumovirus    Acute bronchospasm    Acute diastolic congestive heart failure (HCC)    Oropharyngeal dysphagia    Prediabetes    History of Clostridioides difficile infection    History of sepsis    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  Return in about 5 months (around 6/30/2024) for medication monitoring-blood pressure recheck.

## 2024-01-30 NOTE — PATIENT INSTRUCTIONS
Component      Latest Ref Rng 1/26/2024   HEPATITIS B SURFACE$ANTIBODY (QUANT)      > OR = 10 mIU/mL <5 (L)    HBSAg Screen      NON-REACTIVE  NON-REACTIVE       Legend:  (L) Low

## 2024-02-29 ENCOUNTER — NURSE ONLY (OUTPATIENT)
Dept: FAMILY MEDICINE CLINIC | Facility: CLINIC | Age: 64
End: 2024-02-29
Payer: COMMERCIAL

## 2024-02-29 DIAGNOSIS — Z00.00 ANNUAL PHYSICAL EXAM: ICD-10-CM

## 2024-02-29 PROCEDURE — 90746 HEPB VACCINE 3 DOSE ADULT IM: CPT | Performed by: FAMILY MEDICINE

## 2024-02-29 PROCEDURE — 90471 IMMUNIZATION ADMIN: CPT | Performed by: FAMILY MEDICINE

## 2024-03-21 ENCOUNTER — MED REC SCAN ONLY (OUTPATIENT)
Dept: FAMILY MEDICINE CLINIC | Facility: CLINIC | Age: 64
End: 2024-03-21

## 2024-03-21 ENCOUNTER — TELEPHONE (OUTPATIENT)
Dept: FAMILY MEDICINE CLINIC | Facility: CLINIC | Age: 64
End: 2024-03-21

## 2024-03-21 NOTE — TELEPHONE ENCOUNTER
Received [facility] request for additional documentation for billing services.   Added requested information and faxed to [phone]

## 2024-04-10 ENCOUNTER — TELEPHONE (OUTPATIENT)
Dept: FAMILY MEDICINE CLINIC | Facility: CLINIC | Age: 64
End: 2024-04-10

## 2024-04-10 NOTE — TELEPHONE ENCOUNTER
Collected 1/2/2024  7:30 AM       Status: Final result        Component  Ref Range & Units 1/2/24  7:30 AM   ColoGuard Test Result  Negative Negative          Sent to provider for review.

## 2024-04-11 NOTE — TELEPHONE ENCOUNTER
LMOM to return call to the office. Provided pt office phone [phone] along with office hours.

## 2024-06-18 LAB
ABSOLUTE BASOPHILS: 62 CELLS/UL (ref 0–200)
ABSOLUTE EOSINOPHILS: 359 CELLS/UL (ref 15–500)
ABSOLUTE LYMPHOCYTES: 2886 CELLS/UL (ref 850–3900)
ABSOLUTE MONOCYTES: 741 CELLS/UL (ref 200–950)
ABSOLUTE NEUTROPHILS: 3752 CELLS/UL (ref 1500–7800)
ALBUMIN/GLOBULIN RATIO: 1.5 (CALC) (ref 1–2.5)
ALBUMIN: 4.1 G/DL (ref 3.6–5.1)
ALKALINE PHOSPHATASE: 86 U/L (ref 35–144)
ALT: 19 U/L (ref 9–46)
AST: 22 U/L (ref 10–35)
BASOPHILS: 0.8 %
BILIRUBIN, TOTAL: 0.6 MG/DL (ref 0.2–1.2)
BUN: 16 MG/DL (ref 7–25)
CALCIUM: 9.2 MG/DL (ref 8.6–10.3)
CARBON DIOXIDE: 24 MMOL/L (ref 20–32)
CHLORIDE: 107 MMOL/L (ref 98–110)
CHOL/HDLC RATIO: 2.3 (CALC)
CHOLESTEROL, TOTAL: 145 MG/DL
CREATININE: 0.94 MG/DL (ref 0.7–1.35)
EGFR: 90 ML/MIN/1.73M2
EOSINOPHILS: 4.6 %
GLOBULIN: 2.7 G/DL (CALC) (ref 1.9–3.7)
GLUCOSE: 88 MG/DL (ref 65–99)
HDL CHOLESTEROL: 64 MG/DL
HEMATOCRIT: 45.4 % (ref 38.5–50)
HEMOGLOBIN A1C: 6 % OF TOTAL HGB
HEMOGLOBIN: 14.5 G/DL (ref 13.2–17.1)
LDL-CHOLESTEROL: 63 MG/DL (CALC)
LYMPHOCYTES: 37 %
MCH: 29.8 PG (ref 27–33)
MCHC: 31.9 G/DL (ref 32–36)
MCV: 93.4 FL (ref 80–100)
MONOCYTES: 9.5 %
MPV: 11.4 FL (ref 7.5–12.5)
NEUTROPHILS: 48.1 %
NON-HDL CHOLESTEROL: 81 MG/DL (CALC)
PLATELET COUNT: 223 THOUSAND/UL (ref 140–400)
POTASSIUM: 4.1 MMOL/L (ref 3.5–5.3)
PROTEIN, TOTAL: 6.8 G/DL (ref 6.1–8.1)
RDW: 13 % (ref 11–15)
RED BLOOD CELL COUNT: 4.86 MILLION/UL (ref 4.2–5.8)
SODIUM: 142 MMOL/L (ref 135–146)
TRIGLYCERIDES: 96 MG/DL
TSH W/REFLEX TO FT4: 2.33 MIU/L (ref 0.4–4.5)
VITAMIN B12: 353 PG/ML (ref 200–1100)
WHITE BLOOD CELL COUNT: 7.8 THOUSAND/UL (ref 3.8–10.8)

## 2024-06-20 ENCOUNTER — OFFICE VISIT (OUTPATIENT)
Dept: FAMILY MEDICINE CLINIC | Facility: CLINIC | Age: 64
End: 2024-06-20

## 2024-06-20 VITALS
WEIGHT: 214.81 LBS | TEMPERATURE: 97 F | HEART RATE: 64 BPM | HEIGHT: 67 IN | BODY MASS INDEX: 33.72 KG/M2 | OXYGEN SATURATION: 95 % | DIASTOLIC BLOOD PRESSURE: 78 MMHG | RESPIRATION RATE: 18 BRPM | SYSTOLIC BLOOD PRESSURE: 122 MMHG

## 2024-06-20 DIAGNOSIS — R73.03 PREDIABETES: ICD-10-CM

## 2024-06-20 DIAGNOSIS — E53.8 B12 DEFICIENCY: ICD-10-CM

## 2024-06-20 DIAGNOSIS — I10 ESSENTIAL HYPERTENSION: Primary | ICD-10-CM

## 2024-06-20 DIAGNOSIS — G47.33 OSA ON CPAP: ICD-10-CM

## 2024-06-20 DIAGNOSIS — E78.00 PURE HYPERCHOLESTEROLEMIA: ICD-10-CM

## 2024-06-20 PROCEDURE — 3074F SYST BP LT 130 MM HG: CPT | Performed by: FAMILY MEDICINE

## 2024-06-20 PROCEDURE — 3008F BODY MASS INDEX DOCD: CPT | Performed by: FAMILY MEDICINE

## 2024-06-20 PROCEDURE — 99214 OFFICE O/P EST MOD 30 MIN: CPT | Performed by: FAMILY MEDICINE

## 2024-06-20 PROCEDURE — 3078F DIAST BP <80 MM HG: CPT | Performed by: FAMILY MEDICINE

## 2024-06-20 RX ORDER — CHLORHEXIDINE GLUCONATE ORAL RINSE 1.2 MG/ML
SOLUTION DENTAL
COMMUNITY
Start: 2024-04-01 | End: 2024-06-20

## 2024-06-20 RX ORDER — AMOXICILLIN 500 MG/1
TABLET, FILM COATED ORAL
COMMUNITY
Start: 2024-03-07 | End: 2024-06-20

## 2024-06-20 RX ORDER — ATENOLOL 25 MG/1
25 TABLET ORAL DAILY
Qty: 90 TABLET | Refills: 1 | Status: SHIPPED | OUTPATIENT
Start: 2024-06-20

## 2024-06-20 RX ORDER — PYRIDOXINE HCL (VITAMIN B6) 50 MG
500 TABLET ORAL DAILY
Qty: 90 TABLET | Refills: 3 | COMMUNITY
Start: 2024-06-20 | End: 2024-07-20

## 2024-06-20 RX ORDER — AMOXICILLIN 500 MG/1
500 TABLET, FILM COATED ORAL EVERY 8 HOURS
COMMUNITY
Start: 2024-04-01 | End: 2024-06-20

## 2024-06-20 RX ORDER — ATORVASTATIN CALCIUM 40 MG/1
40 TABLET, FILM COATED ORAL NIGHTLY
Qty: 90 TABLET | Refills: 1 | Status: SHIPPED | OUTPATIENT
Start: 2024-06-20

## 2024-06-20 RX ORDER — IBUPROFEN 800 MG/1
TABLET ORAL
COMMUNITY
Start: 2024-04-01 | End: 2024-06-20

## 2024-06-20 NOTE — PROGRESS NOTES
CHIEF COMPLAINT:   Chief Complaint   Patient presents with    Medication Follow-Up         HPI:     Fernie Pop is a 64 year old male presents for medication monitoring and discuss labs.    Patient presents for recheck of hypertension. Pt has been taking medications as instructed, no medication side effects, home BP monitoring in the range of 110-120's systolic and 70-80's diastolic.  Taking only atenolol.  Denies headache, dizziness, chest pain, shortness of breath, leg swelling  BP Readings from Last 5 Encounters:   06/20/24 122/78   01/30/24 128/76   12/28/23 131/70   06/12/23 116/70   12/20/22 108/64     Obstructive sleep apnea-iusing cpap nightly    Pt presents for recheck of hyperlipidemia. Patient reports taking medications as instructed, no medication side effects noted. Denies any generalized muscle aches.    Prediabetes-trying to eat healthier.  Following more of a Mediterranean diet since hospitalization  5/2022 .denies polyuria, polydipsia or vision changes.     HEMOGLOBIN A1c (% of total Hgb)   Date Value   06/17/2024 6.0 (H)   07/14/2023 5.6   11/26/2022 5.7 (H)     Wt Readings from Last 6 Encounters:   06/20/24 214 lb 12.8 oz (97.4 kg)   01/30/24 215 lb 6.4 oz (97.7 kg)   12/28/23 216 lb (98 kg)   06/12/23 225 lb (102.1 kg)   12/20/22 217 lb (98.4 kg)   11/22/22 216 lb 9.6 oz (98.2 kg)       HISTORY:  Past Medical History:    PAMELA (acute kidney injury) (HCC)    COPD (chronic obstructive pulmonary disease) (HCC)    diagnosed by Dr. Gray    COVID-19    Essential hypertension    Multiple subsegmental pulmonary emboli without acute cor pulmonale (HCC)    Sleep apnea      Past Surgical History:   Procedure Laterality Date    Colonoscopy  2011      Family History   Problem Relation Age of Onset    Cancer Father 85        in blood    Cancer Mother 60        mouth, tongue    No Known Problems Daughter     Breast Cancer Sister 55        breast cancer     Cancer Brother         liver    Diabetes Sister  50        morbid obesity, diabetes    Obesity Sister     No Known Problems Brother     Cancer Sister         breast cancer      Social History:   Social History     Socioeconomic History    Marital status:    Tobacco Use    Smoking status: Former     Current packs/day: 0.00     Average packs/day: 1 pack/day for 15.0 years (15.0 ttl pk-yrs)     Types: Cigarettes     Start date: 1975     Quit date: 1990     Years since quittin.8    Smokeless tobacco: Never    Tobacco comments:     quit smoking    Vaping Use    Vaping status: Never Used   Substance and Sexual Activity    Alcohol use: Yes     Alcohol/week: 4.0 - 6.0 standard drinks of alcohol     Types: 4 - 6 Cans of beer per week     Comment: 2 drinks daily    Drug use: Never   Other Topics Concern    Caffeine Concern No    Exercise No    Seat Belt No    Special Diet No    Stress Concern No    Weight Concern Yes     Social Determinants of Health      [facility]    Social Connections    [facility]    Housing Stability        Medications (Active prior to today's visit):  Current Outpatient Medications   Medication Sig Dispense Refill    atorvastatin 40 MG Oral Tab Take 1 tablet (40 mg total) by mouth nightly. 90 tablet 1    atenolol 25 MG Oral Tab Take 1 tablet (25 mg total) by mouth daily. 90 tablet 1       Allergies:  No Known Allergies    PSFH elements reviewed from today and agreed except as otherwise stated in HPI.  PHYSICAL EXAM:   /78 (BP Location: Right arm, Patient Position: Sitting, Cuff Size: adult)   Pulse 64   Temp 97 °F (36.1 °C) (Temporal)   Resp 18   Ht 5' 7\" (1.702 m)   Wt 214 lb 12.8 oz (97.4 kg)   SpO2 95%   BMI 33.64 kg/m²   Vital signs reviewed.Appears stated age, well groomed.  Physical Exam   GENERAL: well developed, well nourished,in no apparent distress  EYES; PERRLA, EOM-i B/L.  Sclera clear and non icteric bilateral  SKIN: no rashes,no suspicious lesions  HEENT: atraumatic, normocephalic  NECK: supple,no adenopathy,no bruits, no JVD  LUNGS: clear to auscultation bilateral. No RRW. Good inspiratory and expiratory effort  CARDIO: RRR without murmur, clear S1, S2  VS: no carotid artery bruit bilateral.    GI: good BS's,no masses,No HSM or tenderness.   EXTREMITIES: no cyanosis, clubbing or edema, bilateral. No calf tenderness    LABS     Office Visit on 12/28/2023   Component Date Value    WHITE BLOOD CELL COUNT 06/17/2024 7.8     RED BLOOD CELL COUNT 06/17/2024 4.86     HEMOGLOBIN 06/17/2024 14.5     HEMATOCRIT 06/17/2024 45.4     MCV 06/17/2024 93.4     MCH 06/17/2024 29.8     MCHC 06/17/2024 31.9 (L)     RDW 06/17/2024 13.0     PLATELET COUNT 06/17/2024 223     MPV 06/17/2024 11.4     ABSOLUTE NEUTROPHILS 06/17/2024 3,752     ABSOLUTE LYMPHOCYTES 06/17/2024 2,886     ABSOLUTE MONOCYTES 06/17/2024 741     ABSOLUTE EOSINOPHILS 06/17/2024 359     ABSOLUTE BASOPHILS 06/17/2024 62     NEUTROPHILS 06/17/2024 48.1     LYMPHOCYTES 06/17/2024 37.0     MONOCYTES 06/17/2024 9.5     EOSINOPHILS 06/17/2024 4.6     BASOPHILS 06/17/2024 0.8     GLUCOSE 06/17/2024 88     UREA NITROGEN (BUN) 06/17/2024 16     CREATININE 06/17/2024 0.94     EGFR 06/17/2024 90     BUN/CREATININE RATIO 06/17/2024 SEE NOTE:     SODIUM 06/17/2024 142     POTASSIUM 06/17/2024 4.1     CHLORIDE 06/17/2024 107     CARBON DIOXIDE 06/17/2024 24     CALCIUM 06/17/2024 9.2     PROTEIN, TOTAL 06/17/2024 6.8     ALBUMIN 06/17/2024 4.1     GLOBULIN 06/17/2024 2.7     ALBUMIN/GLOBULIN RATIO 06/17/2024 1.5     BILIRUBIN, TOTAL 06/17/2024 0.6     ALKALINE PHOSPHATASE 06/17/2024 86     AST 06/17/2024 22     ALT 06/17/2024 19     CHOLESTEROL, TOTAL 06/17/2024 145     HDL CHOLESTEROL 06/17/2024 64     TRIGLYCERIDES 06/17/2024 96     LDL-CHOLESTEROL 06/17/2024 63     CHOL/HDLC RATIO 06/17/2024 2.3     NON-HDL CHOLESTEROL 06/17/2024 81     TSH  W/REFLEX TO FT4 06/17/2024 2.33     HEMOGLOBIN A1c 06/17/2024 6.0 (H)     VITAMIN B12 06/17/2024 353         REVIEWED THIS VISIT  ASSESSMENT/PLAN:   64 year old male with    1. Essential hypertension  - atenolol 25 MG Oral Tab; Take 1 tablet (25 mg total) by mouth daily.  Dispense: 90 tablet; Refill: 1  Controlled  Encouraged 150-130mins aerobic exercise weekly  <2g Na diet daily and follow Mediterranean diet  Weight loss if overweight  Home b/p monitoring  goal <140/90- 85% of time, optimal 110-120/70-80 bring readings and cuff to each visit.  Continue current medication  Labs and OV q 6 months     2. Prediabetes-uncontrolled  A1c increasing to 6.0  Declined medication  Wants to work on lifestyle modifications improving diet  .start exercising daily 30-60min  -l follow ow carb and low sugar  Recheck in 6 mos - Hemoglobin A1C; Future    3. MILLICENT on CPAP  Continue cpap    4. Pure hypercholesterolemia  Controlled  Continue atorvastatin  encourage mediterranean diet  Exercise brisk walk 30-60 mins at least 5 days weekly  Lose weight if overweight  Recheck fasting labs in 6 months    5. B12 deficiency  -Restart cyanocobalamin (B-12) 500 MCG Oral Tab; Take 500 mcg by mouth daily.  Dispense: 90 tablet; Refill: 3  B12 less than 400  Patient stopped taking several months ago agrees to restart      Meds This Visit:  Requested Prescriptions     Pending Prescriptions Disp Refills    atenolol 25 MG Oral Tab 90 tablet 1     Sig: Take 1 tablet (25 mg total) by mouth daily.    atorvastatin 40 MG Oral Tab 90 tablet 1     Sig: Take 1 tablet (40 mg total) by mouth nightly.       Health Maintenance:  Health Maintenance   Topic Date Due    PSA  08/26/2023    MA Annual Health Assessment  Never done    COVID-19 Vaccine (6 - 2023-24 season) 02/15/2024    Colorectal Cancer Screening  01/02/2027    Influenza Vaccine  Completed    Annual Depression Screening  Completed    Fall Risk Screening (Annual)  Completed    Pneumococcal Vaccine: 65+  Years  Completed    Zoster Vaccines  Completed         Patient/Caregiver Education: Patient/Caregiver Education: There are no barriers to learning. Medical education done.   Outcome: Patient verbalizes understanding. Patient is notified to call with any questions, comp lications, allergies, or worsening or changing symptoms.  Patient is to call with any side effects or complications from the treatments as a result of today.     Problem List:     Patient Active Problem List   Diagnosis    Numbness and tingling of both legs below knees    Neuralgia of lower extremity    BMI 33.0-33.9,adult    B12 deficiency    Hemorrhage of rectum and anus    Old anteroseptal myocardial infarction    Abnormal resting ECG findings    Sinus tachycardia    Essential hypertension    Acute hypoxemic respiratory failure (HCC)    Influenza A    Sepsis with acute hypoxic respiratory failure without septic shock, due to unspecified organism (HCC)    Neuropathy involving both lower extremities    History of influenza    History of respiratory failure    Acute respiratory failure with hypoxia (HCC)    Acute bronchiolitis due to human metapneumovirus    Acute bronchospasm    Acute diastolic congestive heart failure (HCC)    Oropharyngeal dysphagia    Prediabetes    History of Clostridioides difficile infection    History of sepsis    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  Return in about 8 weeks (around 8/15/2024) for medicare physical, medication monitoring, sooner if needed..

## 2024-07-04 ENCOUNTER — PATIENT MESSAGE (OUTPATIENT)
Dept: FAMILY MEDICINE CLINIC | Facility: CLINIC | Age: 64
End: 2024-07-04

## 2024-07-05 NOTE — TELEPHONE ENCOUNTER
From: Fernie Pop  To: Rosario Pineda  Sent: 7/4/2024 10:26 PM CDT  Subject: Blood tests    I have already scheduled the blood test for prostate that you previously ordered. I have an appointment with you Aug.15. Can you order my other group of blood tests regarding my A1C prior to the appointment? The reason why is that you would be able to view the results to discuss on Aug. 15. I don't know if there is an issue with Medicare paying for those tests if they're ordered prior to my hour long Medicare physical. Please advise me on what to do. Thank you, [place] oPp

## 2024-07-05 NOTE — TELEPHONE ENCOUNTER
Patient completed A1c TSH, lipid, CMP, and CBC on 06/17/2024.   He is scheduled for annual physical 08/15/2024. Per OV notes on 06/2024 recommended repeat labs in 6 months (around 12/15/2024).     Pt is requesting order for physical labs ahead of physical apt since he will be drawing for his PSA.     Please confirm patient will not need labs at time of physical since he completed labs 06/17/2024 and should wait until December for repeat labs

## 2024-07-08 NOTE — TELEPHONE ENCOUNTER
He just had lab work completed 6/17/2024 which is most of what is ordered as a physical and then he just needs his PSA.  I will order his 6 months test that are due in December at that visit.  Please explain to patient.  It is too soon to order labs.  He only needs PSA.

## 2024-07-16 LAB
ALBUMIN/GLOBULIN RATIO: 2 (CALC) (ref 1–2.5)
ALBUMIN: 4.3 G/DL (ref 3.6–5.1)
ALKALINE PHOSPHATASE: 68 U/L (ref 35–144)
ALT: 26 U/L (ref 9–46)
AST: 35 U/L (ref 10–35)
BILIRUBIN, TOTAL: 0.8 MG/DL (ref 0.2–1.2)
BUN: 22 MG/DL (ref 7–25)
CALCIUM: 9.3 MG/DL (ref 8.6–10.3)
CARBON DIOXIDE: 28 MMOL/L (ref 20–32)
CHLORIDE: 104 MMOL/L (ref 98–110)
CHOL/HDLC RATIO: 2.1 (CALC)
CHOLESTEROL, TOTAL: 119 MG/DL
CREATININE: 1.03 MG/DL (ref 0.7–1.35)
EGFR: 81 ML/MIN/1.73M2
GLOBULIN: 2.1 G/DL (CALC) (ref 1.9–3.7)
GLUCOSE: 88 MG/DL (ref 65–99)
HDL CHOLESTEROL: 58 MG/DL
HEMOGLOBIN A1C: 6 % OF TOTAL HGB
LDL-CHOLESTEROL: 42 MG/DL (CALC)
NON-HDL CHOLESTEROL: 61 MG/DL (CALC)
POTASSIUM: 4.4 MMOL/L (ref 3.5–5.3)
PROTEIN, TOTAL: 6.4 G/DL (ref 6.1–8.1)
SODIUM: 140 MMOL/L (ref 135–146)
TRIGLYCERIDES: 100 MG/DL

## 2024-08-15 ENCOUNTER — OFFICE VISIT (OUTPATIENT)
Dept: FAMILY MEDICINE CLINIC | Facility: CLINIC | Age: 64
End: 2024-08-15
Payer: COMMERCIAL

## 2024-08-15 VITALS
OXYGEN SATURATION: 96 % | WEIGHT: 204.63 LBS | SYSTOLIC BLOOD PRESSURE: 128 MMHG | DIASTOLIC BLOOD PRESSURE: 76 MMHG | RESPIRATION RATE: 22 BRPM | BODY MASS INDEX: 32.5 KG/M2 | TEMPERATURE: 98 F | HEIGHT: 66.58 IN | HEART RATE: 53 BPM

## 2024-08-15 DIAGNOSIS — M48.062 SPINAL STENOSIS OF LUMBAR REGION WITH NEUROGENIC CLAUDICATION: ICD-10-CM

## 2024-08-15 DIAGNOSIS — R73.03 PREDIABETES: ICD-10-CM

## 2024-08-15 DIAGNOSIS — E53.8 B12 DEFICIENCY: ICD-10-CM

## 2024-08-15 DIAGNOSIS — G57.93 NEUROPATHY INVOLVING BOTH LOWER EXTREMITIES: ICD-10-CM

## 2024-08-15 DIAGNOSIS — Z12.5 SCREENING FOR MALIGNANT NEOPLASM OF PROSTATE: ICD-10-CM

## 2024-08-15 DIAGNOSIS — I10 ESSENTIAL HYPERTENSION: ICD-10-CM

## 2024-08-15 DIAGNOSIS — G47.33 OSA ON CPAP: ICD-10-CM

## 2024-08-15 DIAGNOSIS — Z11.59 NEED FOR HEPATITIS C SCREENING TEST: ICD-10-CM

## 2024-08-15 DIAGNOSIS — Z00.00 ENCOUNTER FOR ANNUAL HEALTH EXAMINATION: Primary | ICD-10-CM

## 2024-08-15 DIAGNOSIS — E78.00 PURE HYPERCHOLESTEROLEMIA: ICD-10-CM

## 2024-08-15 PROBLEM — J96.01 ACUTE HYPOXEMIC RESPIRATORY FAILURE (HCC): Status: RESOLVED | Noted: 2022-05-18 | Resolved: 2024-08-15

## 2024-08-15 PROBLEM — M79.2 NEURALGIA OF LOWER EXTREMITY: Status: RESOLVED | Noted: 2021-08-24 | Resolved: 2024-08-15

## 2024-08-15 PROBLEM — A41.9 SEPSIS WITH ACUTE HYPOXIC RESPIRATORY FAILURE WITHOUT SEPTIC SHOCK, DUE TO UNSPECIFIED ORGANISM (HCC): Status: RESOLVED | Noted: 2022-05-18 | Resolved: 2024-08-15

## 2024-08-15 PROBLEM — R65.20 SEPSIS WITH ACUTE HYPOXIC RESPIRATORY FAILURE WITHOUT SEPTIC SHOCK, DUE TO UNSPECIFIED ORGANISM (HCC): Status: RESOLVED | Noted: 2022-05-18 | Resolved: 2024-08-15

## 2024-08-15 PROBLEM — J96.01 ACUTE RESPIRATORY FAILURE WITH HYPOXIA (HCC): Status: RESOLVED | Noted: 2022-07-06 | Resolved: 2024-08-15

## 2024-08-15 PROBLEM — R00.0 SINUS TACHYCARDIA: Status: RESOLVED | Noted: 2021-10-25 | Resolved: 2024-08-15

## 2024-08-15 PROBLEM — J96.01 SEPSIS WITH ACUTE HYPOXIC RESPIRATORY FAILURE WITHOUT SEPTIC SHOCK, DUE TO UNSPECIFIED ORGANISM (HCC): Status: RESOLVED | Noted: 2022-05-18 | Resolved: 2024-08-15

## 2024-08-15 PROCEDURE — G0402 INITIAL PREVENTIVE EXAM: HCPCS | Performed by: FAMILY MEDICINE

## 2024-08-15 PROCEDURE — 3078F DIAST BP <80 MM HG: CPT | Performed by: FAMILY MEDICINE

## 2024-08-15 PROCEDURE — 3074F SYST BP LT 130 MM HG: CPT | Performed by: FAMILY MEDICINE

## 2024-08-15 PROCEDURE — 96160 PT-FOCUSED HLTH RISK ASSMT: CPT | Performed by: FAMILY MEDICINE

## 2024-08-15 PROCEDURE — 3008F BODY MASS INDEX DOCD: CPT | Performed by: FAMILY MEDICINE

## 2024-08-15 PROCEDURE — 99214 OFFICE O/P EST MOD 30 MIN: CPT | Performed by: FAMILY MEDICINE

## 2024-08-15 RX ORDER — ATENOLOL 25 MG/1
25 TABLET ORAL DAILY
Qty: 90 TABLET | Refills: 1 | Status: SHIPPED | OUTPATIENT
Start: 2024-08-15

## 2024-08-15 RX ORDER — ATORVASTATIN CALCIUM 40 MG/1
40 TABLET, FILM COATED ORAL NIGHTLY
Qty: 90 TABLET | Refills: 1 | Status: SHIPPED | OUTPATIENT
Start: 2024-08-15

## 2024-08-15 NOTE — PROGRESS NOTES
Subjective:   Fernie Pop is a 64 year old male who presents for a MA AHA (Medicare Advantage Annual Health Assessment) and Medicare Wellness Visit charge within the last 11 months and Patient may not meet criteria for AWV: Please evaluate for correct coding and  discuss chronic conditions and review of labs .     B12 deficiency- taking 500mcg daily since last visit, [facility] labs 2025.     Patient presents for recheck of hypertension. Pt has been taking medications as instructed, no medication side effects, home BP monitoring in the range of 110-120's systolic and 70-80's diastolic.  Taking only atenolol.  Denies headache, dizziness, chest pain, shortness of breath, leg swelling  BP Readings from Last 5 Encounters:   08/15/24 128/76   06/20/24 122/78   01/30/24 128/76   12/28/23 131/70   06/12/23 116/70      Obstructive sleep apnea-iusing cpap nightly     Pt presents for recheck of hyperlipidemia. Patient reports taking medications as instructed, no medication side effects noted. Denies any generalized muscle aches.     Prediabetes-trying to eat healthier.  Following more of a Mediterranean diet since hospitalization  5/2022 .denies polyuria, polydipsia or vision changes.   Lost 10#- avoiding restaurants and eating at home. Exercise- none. Only walks at car shows.  HEMOGLOBIN A1c (% of total Hgb)   Date Value   07/15/2024 6.0 (H)   06/17/2024 6.0 (H)   07/14/2023 5.6   11/26/2022                      5.7    Spinal stenosis of lumbar region with neurogenic claudication-causing neuropathy of lower extremities.  MRI of the lumbar spine 12/23/2021 demonstrated L4/L5 moderate broad-based degenerative disc bulge with moderate bilateral facet joint degenerative changes moderate ligamentum flavum hypertrophy and a congenitally small canal.  There is severe central canal stenosis of 3 mm with severe lateral recess narrowing as well as moderate to severe right greater than left neural foraminal narrowing.  Consult with   2022-recommended neurosurgery consult due to spinal stenosis and neurogenic claudication but patient refuses.  States does not want surgery.  Will not complete consult with neurosurgery.  Continues with neuropathy and tingling in his feet and legs and states not worsening.      Health Maintenance   Topic Date Due    PSA  08/26/2023    COVID-19 Vaccine (6 - 2023-24 season) 02/15/2024    Influenza Vaccine (1) 10/01/2024    Colorectal Cancer Screening  01/02/2027    MA Annual Health Assessment  Completed    Annual Depression Screening  Completed    Fall Risk Screening (Annual)  Completed    Pneumococcal Vaccine: 65+ Years  Completed    Zoster Vaccines  Completed     Wt Readings from Last 6 Encounters:   08/15/24 204 lb 9.6 oz (92.8 kg)   06/20/24 214 lb 12.8 oz (97.4 kg)   01/30/24 215 lb 6.4 oz (97.7 kg)   12/28/23 216 lb (98 kg)   06/12/23 225 lb (102.1 kg)   12/20/22 217 lb (98.4 kg)           History/Other:   Fall Risk Assessment:   He has been screened for Falls and is low risk.      Cognitive Assessment:   Abnormal  What day of the week is this?: Correct  What month is it?: Correct  What year is it?: Correct  Recall \"Ball\": Correct  Recall \"Flag\": Correct  Recall \"Tree\": Incorrect    Functional Ability/Status:   Fernie Pop has some abnormal functions as listed below:  He has Vision problems based on screening of functional status.       Depression Screening (PHQ):  PHQ-2 SCORE: 0  , done 8/8/2024   If you checked off any problems, how difficult have these problems made it for you to do your work, take care of things at home, or get along with other people?: Not difficult at all    Last [facility] Suicide Screening on 8/15/2024 was No Risk.       Advanced Directives:   He does NOT have a Living Will. [Do you have a living will?: No]  He does NOT have a Power of  for Health Care. [Do you have a healthcare power of ?: No]  Discussed Advance Care Planning with patient (and  family/surrogate if present). Standard forms made available to patient in After Visit Summary.      Patient Active Problem List   Diagnosis    Numbness and tingling of both legs below knees    Neuralgia of lower extremity    BMI 33.0-33.9,adult    B12 deficiency    Hemorrhage of rectum and anus    Old anteroseptal myocardial infarction    Abnormal resting ECG findings    Sinus tachycardia    Essential hypertension    Acute hypoxemic respiratory failure (HCC)    Influenza A    Sepsis with acute hypoxic respiratory failure without septic shock, due to unspecified organism (HCC)    Neuropathy involving both lower extremities    History of influenza    History of respiratory failure    Acute respiratory failure with hypoxia ([facility])    Acute bronchiolitis due to human metapneumovirus    Acute bronchospasm    Acute diastolic congestive heart failure ([facility])    Oropharyngeal dysphagia    Prediabetes    History of Clostridioides difficile infection    History of sepsis    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    Current Medications:  Outpatient Medications Marked as Taking for the 8/15/24 encounter (Office Visit) with Rosario Pineda DO   Medication Sig    atenolol 25 MG Oral Tab Take 1 tablet (25 mg total) by mouth daily.    atorvastatin 40 MG Oral Tab Take 1 tablet (40 mg total) by mouth nightly.       Medical History:  He  has a past medical history of PAMELA (acute kidney injury) ([facility]) (05/18/2022), COPD (chronic obstructive pulmonary disease) ([facility]) (July 2022), COVID-19 (06/22/2022), Essential hypertension, Multiple subsegmental pulmonary emboli without acute cor pulmonale ([facility]) (06/22/2022), and Sleep apnea (2022).  Surgical History:  He  has a past surgical history that includes colonoscopy (2011).   Family History:  His family history includes Breast Cancer (age of onset: 55) in his sister; Cancer in  his brother and sister; Cancer (age of onset: 60) in his mother; Cancer (age of onset: 85) in his father; Diabetes (age of onset: 50) in his sister; No Known Problems in his brother and daughter; Obesity in his sister.  Social History:  He  reports that he quit smoking about 33 years ago. His smoking use included cigarettes. He started smoking about 48 years ago. He has a 15 pack-year smoking history. He has never used smokeless tobacco. He reports current alcohol use of about 4.0 - 6.0 standard drinks of alcohol per week. He reports that he does not use drugs.    Tobacco:  He smoked tobacco in the past but quit greater than 12 months ago.  Social History     Tobacco Use   Smoking Status Former    Current packs/day: 0.00    Average packs/day: 1 pack/day for 15.0 years (15.0 ttl pk-yrs)    Types: Cigarettes    Start date: 1975    Quit date: 1990    Years since quittin.9   Smokeless Tobacco Never   Tobacco Comments    quit smoking           CAGE Alcohol Screen:   CAGE screening score of 0 on 2024, showing low risk of alcohol abuse.      Patient Care Team:  Rosario Pineda DO as PCP - General (Family Medicine)  Didier Hernandez MD (NEUROSURGERY)    Review of Systems  GENERAL: feels well otherwise  SKIN: denies any unusual skin lesions  EYES: denies blurred vision or double vision  HEENT: denies nasal congestion, sinus pain or ST  LUNGS: denies shortness of breath with exertion  CARDIOVASCULAR: denies chest pain on exertion  GI: denies abdominal pain, denies heartburn  : 1 per night nocturia, no complaint of urinary incontinence, denies change in stream, blood in the urine, urinary hesitancy, frequency or urinary incontinence.  No family history of prostate cancer.  MUSCULOSKELETAL: denies back pain  NEURO: denies headaches  PSYCHE: denies depression or anxiety  HEMATOLOGIC: denies hx of anemia  ENDOCRINE: denies thyroid history  ALL/ASTHMA: denies hx of allergy or asthma    Objective:    Physical Exam  General Appearance:  Alert, cooperative, no distress, appears stated age   Head:  Normocephalic, without obvious abnormality, atraumatic   Eyes:  PERRL, conjunctiva/corneas clear, EOM's intact, both eyes   Ears:  Normal TM's and external ear canals, both ears   Nose: Nares normal, septum midline, mucosa normal, no drainage or sinus tenderness   Throat: Lips, mucosa, and tongue normal; teeth and gums normal   Neck: Supple, symmetrical, trachea midline, no adenopathy, thyroid: not enlarged, symmetric, no tenderness/mass/nodules, no carotid bruit or JVD   Back:   Symmetric, no curvature, ROM normal, no CVA tenderness   Lungs:   Clear to auscultation bilaterally, respirations unlabored   Chest Wall:  No tenderness or deformity   Heart:  Regular rate and rhythm, S1, S2 normal, no murmur, rub or gallop   Abdomen:   Soft, non-tender, bowel sounds active all four quadrants,  no masses, no organomegaly   Extremities: Extremities normal, atraumatic, no cyanosis or edema   Pulses: 2+ and symmetric   Skin: Skin color, texture, turgor normal, no rashes or lesions   Lymph nodes: Cervical, supraclavicular, and axillary nodes normal   Neurologic: Normal     /76 (BP Location: Left arm, Patient Position: Sitting, Cuff Size: adult)   Pulse 53   Temp 97.9 °F (36.6 °C) (Temporal)   Resp 22   Ht 5' 6.58\" (1.691 m)   Wt 204 lb 9.6 oz (92.8 kg)   SpO2 96%   BMI 32.46 kg/m²  Estimated body mass index is 32.46 kg/m² as calculated from the following:    Height as of this encounter: 5' 6.58\" (1.691 m).    Weight as of this encounter: 204 lb 9.6 oz (92.8 kg).    Medicare Hearing Assessment:   Hearing Screening    Screening Method: Finger Rub  Finger Rub Result: Pass         Visual Acuity:   Right Eye Visual Acuity: Uncorrected Right Eye Chart Acuity: 20/40   Left Eye Visual Acuity: Uncorrected Left Eye Chart Acuity: 20/40   Both Eyes Visual Acuity: Uncorrected Both Eyes Chart Acuity: 20/40   Able To Tolerate Visual  Acuity: Yes      Component      Latest Ref Rng 6/17/2024 7/15/2024   Glucose      65 - 99 mg/dL  88    BUN      7 - 25 mg/dL  22    CREATININE      0.70 - 1.35 mg/dL  1.03    EGFR      > OR = 60 mL/min/1.73m2  81    BUN/CREATININE RATIO      6 - 22 (calc)  SEE NOTE:    Sodium      135 - 146 mmol/L  140    Potassium      3.5 - 5.3 mmol/L  4.4    Chloride      98 - 110 mmol/L  104    Carbon Dioxide, Total      20 - 32 mmol/L  28    CALCIUM      8.6 - 10.3 mg/dL  9.3    PROTEIN, TOTAL      6.1 - 8.1 g/dL  6.4    Albumin      3.6 - 5.1 g/dL  4.3    Globulin      1.9 - 3.7 g/dL (calc)  2.1    A/G Ratio      1.0 - 2.5 (calc)  2.0    Total Bilirubin      0.2 - 1.2 mg/dL  0.8    Alkaline Phosphatase      35 - 144 U/L  68    AST (SGOT)      10 - 35 U/L  35    ALT (SGPT)      9 - 46 U/L  26    Cholesterol, Total      <200 mg/dL  119    HDL Cholesterol      > OR = 40 mg/dL  58    Triglycerides      <150 mg/dL  100    LDL Cholesterol Calc      mg/dL (calc)  42    Chol/HDL Ratio      <5.0 (calc)  2.1    NON-HDL CHOLESTEROL      <130 mg/dL (calc)  61    HEMOGLOBIN A1c      <5.7 % of total Hgb  6.0 (H)    Vitamin B12      200 - 1,100 pg/mL 353        Legend:  (H) High      Assessment & Plan:   Fernie Pop is a 64 year old male who presents for a Medicare Assessment.     1. Encounter for annual health examination (Primary)  2. Prediabetes  -     Detailed, Mod Complex (51424)  -     Comp Metabolic Panel (14); Future; Expected date: 02/03/2025  -     Hemoglobin A1C; Future; Expected date: 02/03/2025  -     Comp Metabolic Panel (14)  -     Hemoglobin A1C  .start exercising daily 30-60min  -low carb and low sugar  Continue with weight loss-congratulated on 10 pound weight loss since June 2024-was intentional-cut portions and stopped eating in restaurants  Recheck in 6 mos - Hemoglobin A1C; Future-2/2025    3. Essential hypertension  -     Comp Metabolic Panel (14); Future; Expected date: 02/03/2025  -     Atenolol; Take 1 tablet  (25 mg total) by mouth daily.  Dispense: 90 tablet; Refill: 1  Controlled  Encouraged 150-130mins aerobic exercise weekly  <2g Na diet daily and follow Mediterranean diet  Weight loss if overweight  Home b/p monitoring  goal <140/90- 85% of time, optimal 110-120/70-80 bring readings and cuff to each visit.  Continue current medication  Labs and OV q 6 months    4. Pure hypercholesterolemia  -     Detailed, Mod Complex (40899)  -     Lipid Panel; Future; Expected date: 02/03/2025  -     Comp Metabolic Panel (14); Future; Expected date: 02/03/2025  -     Lipid Panel  -     Comp Metabolic Panel (14)  -     Atorvastatin Calcium; Take 1 tablet (40 mg total) by mouth nightly.  Dispense: 90 tablet; Refill: 1  Controlled  Reviewed recent lipid panel 7/2024  Continue atorvastatin  encourage mediterranean diet  Exercise brisk walk 30-60 mins at least 5 days weekly  Lose weight if overweight  Recheck fasting lipid panel and CMP every 6 months    5. B12 deficiency  -     Detailed, Mod Complex (49902)  -     Vitamin B12; Future; Expected date: 02/03/2025  -     Vitamin B12  Continue B12 500 mcg started after June 2024 labs showing deficiency  Repeat B12 in 6 months    7. Neuropathy involving both lower extremities  8.  Spinal stenosis of lumbar region with neurogenic claudication  -     Detailed, Mod Complex (18891)  -     Detailed, Mod Complex (27726)  Due to spinal stenosis of lumbar region with neurogenic claudication  Patient does not want surgery and refuses to see neurosurgery to discuss management options  Symptoms have not worsened per patient    9. MILLICENT on CPAP  -     Detailed, Mod Complex (67117)  Continue compliance with CPAP-wearing 6 hours a night  Discussed can also help with weight loss    10. Need for hepatitis C screening test  -     HCV Antibody    11. Screening for malignant neoplasm of prostate  -     PSA Total, Diagnostic-printed PSA lab and patient to complete only this lab in the next 3 weeks.  Remaining  labs are to be completed in 6 months    The patient indicates understanding of these issues and agrees to the plan.  Lab work ordered.  Prescription medication ordered.  Reinforced healthy diet, lifestyle, and exercise.      Return in 6 months (on 2/15/2025) for HTN,HL,preDM.     Rosario Pineda DO, 8/15/2024     Supplementary Documentation:   General Health:  In the past six months, have you lost more than 10 pounds without trying?: 2 - No  Has your appetite been poor?: No  Type of Diet: Low Carb  How does the patient maintain a good energy level?: Daily Walks  How would you describe your daily physical activity?: Light  How would you describe your current health state?: Good  How do you maintain positive mental well-being?: Social Interaction;Visiting Family  On a scale of 0 to 10, with 0 being no pain and 10 being severe pain, what is your pain level?: 0 - (None)  In the past six months, have you experienced urine leakage?: 0-No  At any time do you feel concerned for the safety/well-being of yourself and/or your children, in your home or elsewhere?: No  Have you had any immunizations at another office such as Influenza, Hepatitis B, Tetanus, or Pneumococcal?: No    Health Maintenance   Topic Date Due    PSA  08/26/2023    MA Annual Health Assessment  Never done    COVID-19 Vaccine (6 - 2023-24 season) 02/15/2024    Influenza Vaccine (1) 10/01/2024    Colorectal Cancer Screening  01/02/2027    Annual Depression Screening  Completed    Fall Risk Screening (Annual)  Completed    Pneumococcal Vaccine: 65+ Years  Completed    Zoster Vaccines  Completed

## 2024-09-04 LAB — PSA, TOTAL: 1.9 NG/ML

## 2024-09-21 ENCOUNTER — PATIENT MESSAGE (OUTPATIENT)
Dept: FAMILY MEDICINE CLINIC | Facility: CLINIC | Age: 64
End: 2024-09-21

## 2024-09-23 NOTE — TELEPHONE ENCOUNTER
From: Fernie Pop  To: Rosario Pineda  Sent: 9/21/2024 6:12 PM CDT  Subject: flu shot done 9/21/24    Please update my files for flu shot done 9/21/24 at [facility]. Thank you, [place]

## 2024-09-28 ENCOUNTER — PATIENT MESSAGE (OUTPATIENT)
Dept: FAMILY MEDICINE CLINIC | Facility: CLINIC | Age: 64
End: 2024-09-28

## 2024-09-30 NOTE — TELEPHONE ENCOUNTER
From: Fernie Pop  To: Rosario Pineda  Sent: 9/28/2024 12:02 PM CDT  Subject: Covid vaccine update file    please update my file for Covid 19 Spikevax Moderna read at [facility] on 09/28/24. Thank you,

## 2024-11-11 ENCOUNTER — PATIENT MESSAGE (OUTPATIENT)
Dept: FAMILY MEDICINE CLINIC | Facility: CLINIC | Age: 64
End: 2024-11-11

## 2025-02-04 LAB
ALBUMIN/GLOBULIN RATIO: 1.7 (CALC) (ref 1–2.5)
ALBUMIN: 4.2 G/DL (ref 3.6–5.1)
ALKALINE PHOSPHATASE: 68 U/L (ref 35–144)
ALT: 27 U/L (ref 9–46)
AST: 30 U/L (ref 10–35)
BILIRUBIN, TOTAL: 0.9 MG/DL (ref 0.2–1.2)
BUN: 15 MG/DL (ref 7–25)
CALCIUM: 9.4 MG/DL (ref 8.6–10.3)
CARBON DIOXIDE: 27 MMOL/L (ref 20–32)
CHLORIDE: 104 MMOL/L (ref 98–110)
CHOL/HDLC RATIO: 2.2 (CALC)
CHOLESTEROL, TOTAL: 123 MG/DL
CREATININE: 0.76 MG/DL (ref 0.7–1.35)
EGFR: 100 ML/MIN/1.73M2
GLOBULIN: 2.5 G/DL (CALC) (ref 1.9–3.7)
GLUCOSE: 76 MG/DL (ref 65–99)
HDL CHOLESTEROL: 57 MG/DL
HEMOGLOBIN A1C: 5.8 % OF TOTAL HGB
LDL-CHOLESTEROL: 50 MG/DL (CALC)
NON-HDL CHOLESTEROL: 66 MG/DL (CALC)
POTASSIUM: 4.1 MMOL/L (ref 3.5–5.3)
PROTEIN, TOTAL: 6.7 G/DL (ref 6.1–8.1)
SODIUM: 140 MMOL/L (ref 135–146)
TRIGLYCERIDES: 82 MG/DL
VITAMIN B12: >2000 PG/ML (ref 200–1100)

## 2025-02-10 ENCOUNTER — OFFICE VISIT (OUTPATIENT)
Dept: FAMILY MEDICINE CLINIC | Facility: CLINIC | Age: 65
End: 2025-02-10
Payer: COMMERCIAL

## 2025-02-10 VITALS
TEMPERATURE: 98 F | DIASTOLIC BLOOD PRESSURE: 72 MMHG | BODY MASS INDEX: 30.7 KG/M2 | WEIGHT: 191 LBS | SYSTOLIC BLOOD PRESSURE: 118 MMHG | OXYGEN SATURATION: 97 % | RESPIRATION RATE: 19 BRPM | HEIGHT: 66 IN | HEART RATE: 62 BPM

## 2025-02-10 DIAGNOSIS — R63.4 WEIGHT LOSS: ICD-10-CM

## 2025-02-10 DIAGNOSIS — I10 ESSENTIAL HYPERTENSION: Primary | ICD-10-CM

## 2025-02-10 DIAGNOSIS — E78.00 PURE HYPERCHOLESTEROLEMIA: ICD-10-CM

## 2025-02-10 DIAGNOSIS — E53.8 B12 DEFICIENCY: ICD-10-CM

## 2025-02-10 DIAGNOSIS — M48.062 SPINAL STENOSIS OF LUMBAR REGION WITH NEUROGENIC CLAUDICATION: ICD-10-CM

## 2025-02-10 DIAGNOSIS — R20.2 NUMBNESS AND TINGLING OF BOTH LEGS BELOW KNEES: ICD-10-CM

## 2025-02-10 DIAGNOSIS — R20.0 NUMBNESS AND TINGLING OF BOTH LEGS BELOW KNEES: ICD-10-CM

## 2025-02-10 DIAGNOSIS — I25.2 OLD ANTEROSEPTAL MYOCARDIAL INFARCTION: ICD-10-CM

## 2025-02-10 DIAGNOSIS — R73.03 PREDIABETES: ICD-10-CM

## 2025-02-10 DIAGNOSIS — G47.33 OSA ON CPAP: ICD-10-CM

## 2025-02-10 PROCEDURE — G2211 COMPLEX E/M VISIT ADD ON: HCPCS | Performed by: FAMILY MEDICINE

## 2025-02-10 PROCEDURE — 99214 OFFICE O/P EST MOD 30 MIN: CPT | Performed by: FAMILY MEDICINE

## 2025-02-10 PROCEDURE — 3074F SYST BP LT 130 MM HG: CPT | Performed by: FAMILY MEDICINE

## 2025-02-10 PROCEDURE — 3078F DIAST BP <80 MM HG: CPT | Performed by: FAMILY MEDICINE

## 2025-02-10 PROCEDURE — 3008F BODY MASS INDEX DOCD: CPT | Performed by: FAMILY MEDICINE

## 2025-02-10 RX ORDER — ATORVASTATIN CALCIUM 40 MG/1
40 TABLET, FILM COATED ORAL NIGHTLY
Qty: 90 TABLET | Refills: 1 | Status: SHIPPED | OUTPATIENT
Start: 2025-02-10

## 2025-02-10 NOTE — PROGRESS NOTES
CHIEF COMPLAINT:   Chief Complaint   Patient presents with    Medication Follow-Up     6 months,discuss lab results          HPI:     Fernie Pop is a 64 year old male presents for medication monitoring and discuss labs on 2/3/2025    B12 deficiency- stopped 500mcg daily 1 month ago. Cut quantity of food not type of food- previously overeating per pt with 2 cheeseburger and a hotdog and fries. Using stationary pedaling 100-150 minutes daily. Lost 13#.    Patient presents for recheck of hypertension. Pt has been taking medications as instructed, no medication side effects, home BP monitoring in the range of 's systolic and 70-80's diastolic- mean low 100's.  Using sitting bike pedal 10minutes on and 10 off throughout the day-  minutes a day. Lost 13#. Taking only atenolol.  Denies headache, dizziness, chest pain, shortness of breath, leg swelling  BP Readings from Last 5 Encounters:   02/10/25 118/72   08/15/24 128/76   06/20/24 122/78   01/30/24 128/76   12/28/23 131/70      Pt presents for recheck of hyperlipidemia. Patient reports taking medications as instructed, no medication side effects noted. Denies any generalized muscle aches.     Prediabetes-trying to eat healthier.  Following more of a Mediterranean diet since hospitalization  5/2022 .denies polyuria, polydipsia or vision changes.   Lost 13#- avoiding restaurants and eating at home. Exercise- none. Only walks at car shows.  HEMOGLOBIN A1c (% of total Hgb)   Date Value   02/03/2025 5.8 (H)   07/15/2024 6.0 (H)   06/17/2024 6.0 (H)     Spinal stenosis of lumbar region with neurogenic claudication-causing neuropathy of lower extremities.  MRI of the lumbar spine 12/23/2021 demonstrated L4/L5 moderate broad-based degenerative disc bulge with moderate bilateral facet joint degenerative changes moderate ligamentum flavum hypertrophy and a congenitally small canal.  There is severe central canal stenosis of 3 mm with severe lateral recess  narrowing as well as moderate to severe right greater than left neural foraminal narrowing.  Consult with  -recommended neurosurgery consult due to spinal stenosis and neurogenic claudication but patient refuses.  States does not want surgery.  Will not complete consult with neurosurgery.  Continues with neuropathy and tingling in his feet and legs and states not worsening.     Obstructive sleep apnea-iusing cpap nightly- sleeps 8h broken up due to machine. Takes longer to fall asleep. Wakes up at 2am bc noise of machine or pressure in nose bother him or tightness of mass.     Wt Readings from Last 6 Encounters:   02/10/25 191 lb (86.6 kg)   08/15/24 204 lb 9.6 oz (92.8 kg)   24 214 lb 12.8 oz (97.4 kg)   24 215 lb 6.4 oz (97.7 kg)   23 216 lb (98 kg)   23 225 lb (102.1 kg)   Starting weight 230# per patient highest weight      HISTORY:  Past Medical History:    PAMELA (acute kidney injury)    COPD (chronic obstructive pulmonary disease) (HCC)    diagnosed by Dr. Gray    COVID-19    Essential hypertension    Multiple subsegmental pulmonary emboli without acute cor pulmonale (HCC)    Sleep apnea      Past Surgical History:   Procedure Laterality Date    Colonoscopy        Family History   Problem Relation Age of Onset    Cancer Father 85        in blood    Cancer Mother 60        mouth, tongue    No Known Problems Daughter     Breast Cancer Sister 55        breast cancer     Cancer Brother         liver    Diabetes Sister 50        morbid obesity, diabetes    Obesity Sister     No Known Problems Brother     Cancer Sister         breast cancer      Social History:   Social History     Socioeconomic History    Marital status:    Tobacco Use    Smoking status: Former     Current packs/day: 0.00     Average packs/day: 1 pack/day for 15.0 years (15.0 ttl pk-yrs)     Types: Cigarettes     Start date: 1975     Quit date: 1990     Years since quittin.4      Passive exposure: Never    Smokeless tobacco: Never    Tobacco comments:     quit smoking 1990   Vaping Use    Vaping status: Never Used   Substance and Sexual Activity    Alcohol use: Yes     Alcohol/week: 4.0 - 6.0 standard drinks of alcohol     Types: 4 - 6 Cans of beer per week     Comment: 2 drinks daily    Drug use: Never   Other Topics Concern    Caffeine Concern No    Exercise No    Seat Belt No    Special Diet No    Stress Concern No    Weight Concern Yes     Social Drivers of Health     Food Insecurity: No Food Insecurity (2/10/2025)    NCSS - Food Insecurity     Worried About Running Out of Food in the Last Year: No     Ran Out of Food in the Last Year: No   Transportation Needs: No Transportation Needs (2/10/2025)    NCSS - Transportation     Lack of Transportation: No   Housing Stability: Not At Risk (2/10/2025)    NCSS - Housing/Utilities     Has Housing: Yes     Worried About Losing Housing: No     Unable to Get Utilities: No        Medications (Active prior to today's visit):  Current Outpatient Medications   Medication Sig Dispense Refill    atorvastatin 40 MG Oral Tab Take 1 tablet (40 mg total) by mouth nightly. 90 tablet 1    atenolol 25 MG Oral Tab Take 1 tablet (25 mg total) by mouth daily. 90 tablet 1       Allergies:  Allergies[1]    PSFH elements reviewed from today and agreed except as otherwise stated in HPI.  PHYSICAL EXAM:   /72   Pulse 62   Temp 97.9 °F (36.6 °C) (Temporal)   Resp 19   Ht 5' 6\" (1.676 m)   Wt 191 lb (86.6 kg)   SpO2 97%   BMI 30.83 kg/m²   Vital signs reviewed.Appears stated age, well groomed.  Physical Exam   GENERAL: well developed, well nourished,in no apparent distress  EYES; PERRLA, EOM-i B/L. Sclera clear and non icteric bilateral  SKIN: no rashes,no suspicious lesions  HEENT: atraumatic, normocephalic  NECK: supple,no adenopathy,no bruits, no JVD  LUNGS: clear to auscultation bilateral. No RRW. Good inspiratory and expiratory effort  CARDIO: RRR  without murmur, clear S1, S2  VS: no carotid artery bruit bilateral.    GI: good BS's,no masses,No HSM or tenderness.   EXTREMITIES: no cyanosis, clubbing or edema, bilateral. No calf tenderness    LABS     No visits with results within 2 Month(s) from this visit.   Latest known visit with results is:   Office Visit on 08/15/2024   Component Date Value    HEPATITIS C ANTIBODY 02/03/2025 NON-REACTIVE     PSA, TOTAL 09/03/2024 1.90     CHOLESTEROL, TOTAL 02/03/2025 123     HDL CHOLESTEROL 02/03/2025 57     TRIGLYCERIDES 02/03/2025 82     LDL-CHOLESTEROL 02/03/2025 50     CHOL/HDLC RATIO 02/03/2025 2.2     NON-HDL CHOLESTEROL 02/03/2025 66     GLUCOSE 02/03/2025 76     UREA NITROGEN (BUN) 02/03/2025 15     CREATININE 02/03/2025 0.76     EGFR 02/03/2025 100     BUN/CREATININE RATIO 02/03/2025 SEE NOTE:     SODIUM 02/03/2025 140     POTASSIUM 02/03/2025 4.1     CHLORIDE 02/03/2025 104     CARBON DIOXIDE 02/03/2025 27     CALCIUM 02/03/2025 9.4     PROTEIN, TOTAL 02/03/2025 6.7     ALBUMIN 02/03/2025 4.2     GLOBULIN 02/03/2025 2.5     ALBUMIN/GLOBULIN RATIO 02/03/2025 1.7     BILIRUBIN, TOTAL 02/03/2025 0.9     ALKALINE PHOSPHATASE 02/03/2025 68     AST 02/03/2025 30     ALT 02/03/2025 27     HEMOGLOBIN A1c 02/03/2025 5.8 (H)     VITAMIN B12 02/03/2025 >2000 (H)       REVIEWED THIS VISIT  ASSESSMENT/PLAN:   64 year old male with    1. Essential hypertension  - Comp Metabolic Panel (14); Future  - Comp Metabolic Panel (14)  Low home bp  Lost 13#  Stop atenolol 25mg  If bp >140/90 then call office, to restart atenolol  Pt will bring BP cuff and numbers in 1 months to office for recheck    2. Pure hypercholesterolemia  3. Old anteroseptal myocardial infarction  - atorvastatin 40 MG Oral Tab; Take 1 tablet (40 mg total) by mouth nightly.  Dispense: 90 tablet; Refill: 1  - Lipid Panel; Future  - Comp Metabolic Panel (14); Future  - CBC With Differential With Platelet; Future  - Lipid Panel  LDL <70, normal  lipids  controlled  encourage mediterranean diet  Exercise brisk walk 30-60 mins at least 5 days weekly  Lose weight if overweight  Recheck fasting labs q 6 months    4. MILLICENT on CPAP  Continue Cpap  Lost 13#  Needs annual reevaluation    5. Prediabetes  - Comp Metabolic Panel (14); Future  - HGB A1C [496] [Q]; Future  - Comp Metabolic Panel (14)  - HGB A1C [496] [Q.  Improving  Continue with weight loss  Congratulated pt. A1c 5.8  Continue exercise 100-150 daily on pedals    6. B12 deficiency  - VITAMIN B12 [927][Q]; Future  - VITAMIN B12 [927][Q]  Off B12 x 1 month  Recheck in 3-4 months    7. Spinal stenosis of lumbar region with neurogenic claudication  8. Numbness and tingling of both legs below knees  Continue using stationery pedaler 100mins daily  Cannot walk due to discomfort in legs in upright  Refusing consultg with Severiano Lutz    9. BMI 30.0-30.9,adult  - TSH W Reflex To Free T4; Future  - TSH W Reflex To Free T4    10. Weight loss  - TSH W Reflex To Free T4; Future  - TSH W Reflex To Free T4  Continue weight loss  Encourage Mediterranean diet- will help if changes quality of food for weight loss.    The patient and provider have a longitudinal relationship to address/treat the serious or complex condition as stated in this encounter.      Meds This Visit:  Requested Prescriptions     Pending Prescriptions Disp Refills    atorvastatin 40 MG Oral Tab 90 tablet 1     Sig: Take 1 tablet (40 mg total) by mouth nightly.       Health Maintenance:  Health Maintenance   Topic Date Due    Annual Well Visit  01/01/2025    PSA  09/03/2026    Colorectal Cancer Screening  01/02/2027    Influenza Vaccine  Completed    Annual Depression Screening  Completed    Fall Risk Screening (Annual)  Completed    Pneumococcal Vaccine: 50+ Years  Completed    Zoster Vaccines  Completed    COVID-19 Vaccine  Completed    Meningococcal B Vaccine  Aged Out         Patient/Caregiver Education: Patient/Caregiver Education: There are no  barriers to learning. Medical education done.   Outcome: Patient verbalizes understanding. Patient is notified to call with any questions, comp lications, allergies, or worsening or changing symptoms.  Patient is to call with any side effects or complications from the treatments as a result of today.     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  Return in about 1 month (around 3/10/2025) for recheck blood pressure off atenolol sooner if needed.            [1] No Known Allergies

## 2025-03-11 ENCOUNTER — OFFICE VISIT (OUTPATIENT)
Dept: FAMILY MEDICINE CLINIC | Facility: CLINIC | Age: 65
End: 2025-03-11
Payer: COMMERCIAL

## 2025-03-11 VITALS
WEIGHT: 193 LBS | SYSTOLIC BLOOD PRESSURE: 128 MMHG | RESPIRATION RATE: 16 BRPM | DIASTOLIC BLOOD PRESSURE: 72 MMHG | BODY MASS INDEX: 29.94 KG/M2 | TEMPERATURE: 97 F | HEIGHT: 67.2 IN | OXYGEN SATURATION: 96 % | HEART RATE: 86 BPM

## 2025-03-11 DIAGNOSIS — G47.33 OSA (OBSTRUCTIVE SLEEP APNEA): ICD-10-CM

## 2025-03-11 DIAGNOSIS — Z01.84 IMMUNITY STATUS TESTING: ICD-10-CM

## 2025-03-11 DIAGNOSIS — E78.00 PURE HYPERCHOLESTEROLEMIA: ICD-10-CM

## 2025-03-11 DIAGNOSIS — Z00.00 ENCOUNTER FOR ANNUAL HEALTH EXAMINATION: Primary | ICD-10-CM

## 2025-03-11 DIAGNOSIS — Z86.39 HISTORY OF NON ANEMIC VITAMIN B12 DEFICIENCY: ICD-10-CM

## 2025-03-11 DIAGNOSIS — R63.4 WEIGHT LOSS: ICD-10-CM

## 2025-03-11 DIAGNOSIS — M48.062 SPINAL STENOSIS OF LUMBAR REGION WITH NEUROGENIC CLAUDICATION: ICD-10-CM

## 2025-03-11 DIAGNOSIS — Z86.79 HISTORY OF HYPERTENSION: ICD-10-CM

## 2025-03-11 DIAGNOSIS — R73.03 PREDIABETES: ICD-10-CM

## 2025-03-11 DIAGNOSIS — G57.93 NEUROPATHY INVOLVING BOTH LOWER EXTREMITIES: ICD-10-CM

## 2025-03-11 DIAGNOSIS — Z23 NEED FOR VACCINATION: ICD-10-CM

## 2025-03-11 PROBLEM — I10 ESSENTIAL HYPERTENSION: Status: RESOLVED | Noted: 2022-01-11 | Resolved: 2025-03-11

## 2025-03-11 RX ORDER — ATORVASTATIN CALCIUM 40 MG/1
40 TABLET, FILM COATED ORAL NIGHTLY
Qty: 90 TABLET | Refills: 1 | Status: SHIPPED | OUTPATIENT
Start: 2025-03-11

## 2025-03-11 NOTE — PROGRESS NOTES
Subjective:   Fernie Pop is a 64 year old male who presents for a MA AHA (Medicare Advantage Annual Health Assessment) and  chronic conditions  .   The following individual(s) verbally consented to be recorded using ambient AI listening technology and understand that they can each withdraw their consent to this listening technology at any point by asking the clinician to turn off or pause the recording:    Patient name: Fernie Pop  Additional names:  none    Patient presents history of hypertension. Pt has is NOT on medications due to low blood pressures. Concerned about blood pressures home BP monitoring in the range of  102-120's systolic and 70-80's diastolic.    Spinal stenosis of lumbar region with neurogenic claudication-causing neuropathy of lower extremities.  MRI of the lumbar spine 12/23/2021 demonstrated L4/L5 moderate broad-based degenerative disc bulge with moderate bilateral facet joint degenerative changes moderate ligamentum flavum hypertrophy and a congenitally small canal.  There is severe central canal stenosis of 3 mm with severe lateral recess narrowing as well as moderate to severe right greater than left neural foraminal narrowing.  Consult with  2022-recommended neurosurgery consult due to spinal stenosis and neurogenic claudication but patient refuses.  States does not want surgery.  Will not complete consult with neurosurgery.  Continues with neuropathy and tingling in his feet and legs and states not worsening.   \  Prediabetes-  Not on medication.  Denies polyuria polydipsia, vision changes.  Diet-cut portion sizes significantly and eating less and lost 37 to 40 pounds in the past year.  Using a sitting paddling bike as main form of exercise 1 to 2 hours a day..  Did also significantly reduce his intake of beer from daily to 6 beers per week.  Last A1c:   HEMOGLOBIN A1c (% of total Hgb)   Date Value   02/03/2025 5.8 (H)   07/15/2024 6.0 (H)   06/17/2024 6.0 (H)            History of Present Illness  The patient presents for an annual physical exam.    He is off blood pressure medication.  Was previously on atenolol for palpitations/hypertension.  Concerned about his blood pressure readings, which fluctuate throughout the day, typically ranging from 101/61 to 118 over 70s, with the highest recorded at 123. He is not on any blood pressure medication and experiences no dizziness, weakness, or palpitations. His pulse rates have varied from 72 to 91, with higher readings correlating with higher blood pressure measurements.  He states he would be unaware of his blood pressure unless he checked.  He feels otherwise normal.  He has lost 37 to 40 pounds by dietary changes in the past year.    He has a history of sleep apnea but stopped using his CPAP machine due to discomfort and a belief that he no longer needs it. He waits for his wife to inform him if he snores, but she has not mentioned anything, possibly due to her own CPAP use.  He is refusing to get retested for sleep apnea.  He is denying hypersomnia, daytime somnolence.    He has experienced significant weight loss, dropping from a high of 230-234 pounds to 193 pounds over less than a year, primarily by controlling portion sizes and reducing overeating. He uses a pedal machine at home occasionally and has reduced his alcohol consumption to about six beers per week, mostly on weekends.    He reports stable neuropathy in his feet, with burning and tingling that comes and goes, often triggered by walking and relieved by sitting. He has spinal stenosis, previously diagnosed, and has tried medications without success. He prefers to avoid surgery at this time.    He urinates about three times a night, which he attributes to fluid intake during the day.  Has not tried limiting fluids prior to bedtime.  Denies change in stream, blood in the urine, urinary hesitancy or frequency during the day.    He quit smoking in the 1990s and  does not experience any breathing difficulties. No dizziness, weakness, palpitations, headache, chest pain, shortness of breath, wheezing, or leg swelling.      History/Other:   Fall Risk Assessment:   He has been screened for Falls and is low risk.      Cognitive Assessment:   He had a completely normal cognitive assessment - see flowsheet entries     Functional Ability/Status:   Fernie Pop has a completely normal functional assessment. See flowsheet for details.        Depression Screening (PHQ):  PHQ-2 SCORE: 0  , done 3/4/2025   If you checked off any problems, how difficult have these problems made it for you to do your work, take care of things at home, or get along with other people?: Not difficult at all    Last [place] [facility] Suicide Screening on 3/11/2025 was No Risk.       Advanced Directives:   He does NOT have a Living Will. [Do you have a living will?: (Patient-Rptd) No]  He does NOT have a Power of  for Health Care. [Do you have a healthcare power of ?: (Patient-Rptd) No]  Discussed Advance Care Planning with patient (and family/surrogate if present). Standard forms made available to patient in After Visit Summary.      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    Current  Medications:  Outpatient Medications Marked as Taking for the 3/11/25 encounter (Office Visit) with Rosario Pineda DO   Medication Sig    atorvastatin 40 MG Oral Tab Take 1 tablet (40 mg total) by mouth nightly.       Medical History:  He  has a past medical history of PAMELA (acute kidney injury) (2022), COPD (chronic obstructive pulmonary disease) ([facility]) (2022), COVID-19 (2022), Essential hypertension, Multiple subsegmental pulmonary emboli without acute cor pulmonale (HCC) (2022), and Sleep apnea ().  Surgical History:  He  has a past surgical history that includes colonoscopy ().   Family History:  His family history includes Breast Cancer (age of onset: 55) in his sister; Cancer in his brother and sister; Cancer (age of onset: 60) in his mother; Cancer (age of onset: 85) in his father; Diabetes (age of onset: 50) in his sister; No Known Problems in his brother and daughter; Obesity in his sister.  Social History:  He  reports that he quit smoking about 34 years ago. His smoking use included cigarettes. He started smoking about 49 years ago. He has a 15 pack-year smoking history. He has never been exposed to tobacco smoke. He has never used smokeless tobacco. He reports current alcohol use of about 4.0 - 6.0 standard drinks of alcohol per week. He reports that he does not use drugs.    Tobacco:  He smoked tobacco in the past but quit greater than 12 months ago.  Social History     Tobacco Use   Smoking Status Former    Current packs/day: 0.00    Average packs/day: 1 pack/day for 15.0 years (15.0 ttl pk-yrs)    Types: Cigarettes    Start date: 1975    Quit date: 1990    Years since quittin.5    Passive exposure: Never   Smokeless Tobacco Never   Tobacco Comments    quit smoking           CAGE Alcohol Screen:   CAGE screening score of 0 on 3/4/2025, showing low risk of alcohol abuse.      Patient Care Team:  Rosario Pineda DO as PCP - General (Family  Medicine)  Didier Hernandez MD (NEUROSURGERY)    Review of Systems  GENERAL: feels well otherwise  SKIN: denies any unusual skin lesions  EYES: denies blurred vision or double vision  HEENT: denies nasal congestion, sinus pain or ST  LUNGS: denies shortness of breath with exertion  CARDIOVASCULAR: denies chest pain on exertion  GI: denies abdominal pain, denies heartburn  : 0-3 per night nocturia, no complaint of urinary incontinence  MUSCULOSKELETAL: denies back pain  NEURO: denies headaches, neuropathy in legs due to spinal stenosis  PSYCHE: denies depression or anxiety  HEMATOLOGIC: denies hx of anemia  ENDOCRINE: denies thyroid history  ALL/ASTHMA: denies hx of allergy or asthma    Objective:   Physical Exam  General Appearance:  Alert, cooperative, no distress, appears stated age   Head:  Normocephalic, without obvious abnormality, atraumatic   Eyes:  PERRL, conjunctiva/corneas clear, EOM's intact, both eyes   Ears:  Normal TM's and external ear canals, both ears   Nose: Nares normal, septum midline, mucosa normal, no drainage or sinus tenderness   Throat: Lips, mucosa, and tongue normal; teeth and gums normal   Neck: Supple, symmetrical, trachea midline, no adenopathy, thyroid: not enlarged, symmetric, no tenderness/mass/nodules, no carotid bruit or JVD   Back:   Symmetric, no curvature, ROM normal, no CVA tenderness   Lungs:   Clear to auscultation bilaterally, respirations unlabored   Chest Wall:  No tenderness or deformity   Heart:  Regular rate and rhythm, S1, S2 normal, no murmur, rub or gallop   Abdomen:   Soft, non-tender, bowel sounds active all four quadrants,  no masses, no organomegaly   Genitalia: Normal male   Rectal: Normal tone, normal prostate, no masses or tenderness   Extremities: Extremities normal, atraumatic, no cyanosis or edema   Pulses: 2+ and symmetric   Skin: Skin color, texture, turgor normal, no rashes or lesions   Lymph nodes: Cervical, supraclavicular, and axillary nodes  normal   Neurologic: Normal     /72   Pulse 86   Temp 97.2 °F (36.2 °C) (Temporal)   Resp 16   Ht 5' 7.2\" (1.707 m)   Wt 193 lb (87.5 kg)   SpO2 96%   BMI 30.05 kg/m²  Estimated body mass index is 30.05 kg/m² as calculated from the following:    Height as of this encounter: 5' 7.2\" (1.707 m).    Weight as of this encounter: 193 lb (87.5 kg).    Medicare Hearing Assessment:   Hearing Screening    Screening Method: Finger Rub  Finger Rub Result: Pass         Visual Acuity:   Right Eye Visual Acuity: Uncorrected Right Eye Chart Acuity: 20/30   Left Eye Visual Acuity: Uncorrected Left Eye Chart Acuity: 20/40   Both Eyes Visual Acuity: Uncorrected Both Eyes Chart Acuity: 20/25   Able To Tolerate Visual Acuity: Yes        Assessment & Plan:   Fernie Pop is a 64 year old male who presents for a Medicare Assessment.     1. Encounter for annual health examination (Primary)  2. History of hypertension-resolved-blood pressures are normal and asymptomatic when BPs are lower end of normal  -     Comp Metabolic Panel (14); Future; Expected date: 09/11/2025  -     Comp Metabolic Panel (14)  3. Pure hypercholesterolemia  -     Lipid Panel; Future; Expected date: 09/11/2025  -     Comp Metabolic Panel (14); Future; Expected date: 09/11/2025  -     CBC With Differential With Platelet; Future; Expected date: 09/11/2025  -     Atorvastatin Calcium; Take 1 tablet (40 mg total) by mouth nightly.  Dispense: 90 tablet; Refill: 1  -     Lipid Panel  -     Comp Metabolic Panel (14)  -     CBC With Differential With Platelet  4. Prediabetes  -     Comp Metabolic Panel (14); Future; Expected date: 09/11/2025  -     Hemoglobin A1C; Future; Expected date: 09/11/2025  -     Comp Metabolic Panel (14)  -     Hemoglobin A1C  5. MILLICENT (obstructive sleep apnea)-asymptomatic refusing any repeat testing    6. History of non anemic vitamin B12 deficiency    7. Neuropathy involving both lower extremities  8. Spinal stenosis of lumbar  region with neurogenic claudication  Overview:  8/15/2024 Spinal stenosis of lumbar region with neurogenic claudication-causing neuropathy of lower extremities.  MRI of the lumbar spine 12/23/2021 demonstrated L4/L5 moderate broad-based degenerative disc bulge with moderate bilateral facet joint degenerative changes moderate ligamentum flavum hypertrophy and a congenitally small canal.  There is severe central canal stenosis of 3 mm with severe lateral recess narrowing as well as moderate to severe right greater than left neural foraminal narrowing.  Consult with  2022-recommended neurosurgery consult due to spinal stenosis and neurogenic claudication but patient refuses.  States does not want surgery.  Will not complete consult with neurosurgery.  Continues with neuropathy and tingling in his feet and legs and states not worsening.  9. Weight loss-intentional from dietary changes  -     TSH W Reflex To Free T4; Future; Expected date: 09/11/2025  -     TSH W Reflex To Free T4  BMI 30-still obese continue with weight loss    10. Need for vaccination  -     Hepatitis A Vaccine; Inject 1 mL (50 Units total) into the muscle once for 1 dose.  Dispense: 1 mL; Refill: 0  -     Hepatitis B Vac Recombinant; Inject 1 mL (20 mcg total) into the muscle once for 1 dose.  Dispense: 1 mL; Refill: 0  11. Immunity status testing-uncertain if had measles  -     Rubeola(Measles)Antibodies, IGG-Immunity  -     Mumps Antibodies, IGG-Immunity  -     Rubella Antibodies, IgG    Assessment & Plan  History of hypertension-off atenolol greater than 6 months due to low blood pressures  Blood pressure fluctuates between 101/61 and 137/85 mmHg. No symptoms of dizziness, weakness, or fatigue. Currently not on antihypertensive medication due to low readings. Home blood pressure monitor is accurate. Optimal blood pressure is <130/80 mmHg; treatment required if consistently >140/90 mmHg.  - Monitor blood pressure regularly  - Increase  fluid intake to prevent dehydration  - Report if blood pressure consistently <100 systolic or >140/90 mmHg    Spinal Stenosis with Neurogenic Claudication  Experiences neuropathy in legs and buttocks, improving with rest or leaning forward. Declined neurosurgeon referral and prefers to avoid surgery. Weight loss advised to reduce lumbar spine stress and potentially alleviate symptoms.  - Continue weight loss efforts  - Stay active to prevent muscle atrophy  - Consume 60-80 grams of protein daily    Prediabetes  Current HbA1c is 5.8%, previously 6.0%. Weight loss has reduced HbA1c. Further weight loss and increased activity advised to reduce insulin resistance and normalize blood glucose levels.  - Continue weight loss efforts  -Encouraged regular walking, continue using stationary peddler when watching TV  - Stay active to utilize glucose without insulin  -Monitor A1c every 6 months in office    Obstructive Sleep Apnea (MILLICENT)  Discontinued CPAP due to discomfort and perceived lack of need. Advised on risks of untreated MILLICENT, including increased risk of cardiovascular events and fatigue. Declined retesting of sleep apnea or pulmonologist referral.  - Consider retesting for sleep apnea if symptoms develop    B12 Deficiency-history of  Previously diagnosed with B12 deficiency; levels now high without supplementation. No current B12 supplementation. Monitor B12 levels if symptoms recur.  - Monitor B12 levels     Vaccination Status  Expressed skepticism about vaccines. Educated on importance and efficacy of vaccines, including hepatitis A and B, tetanus, and MMR. Needs one more hepatitis A and B vaccine, not covered by Medicare in the office.  - Obtain hepatitis A and B vaccine at pharmacy  - Ensure up-to-date on all recommended vaccines    Follow-up  Advised to repeat labs in 6 months and check immunity titers for measles, mumps, rubella. Option for earlier testing if desired.  - Repeat labs in 6 months  - Check immunity  titers for measles, mumps, rubella in 6 months  - Consider earlier testing for immunity titers if desired          The patient indicates understanding of these issues and agrees to the plan.  Lab work ordered.  Prescription medication ordered.  Reinforced healthy diet, lifestyle, and exercise.      Return in about 6 months (around 9/11/2025) for medication monitoring, discuss labs, sooner if needed..     Rosario Pineda DO, 3/11/2025     Supplementary Documentation:   General Health:  In the past six months, have you lost more than 10 pounds without trying?: (Patient-Rptd) 2 - No  Has your appetite been poor?: (Patient-Rptd) No  Type of Diet: (Patient-Rptd) Balanced, Low Carb  How does the patient maintain a good energy level?: Stretching  How would you describe your daily physical activity?: (Patient-Rptd) Light  How would you describe your current health state?: (Patient-Rptd) Good  How do you maintain positive mental well-being?: (Patient-Rptd) Social Interaction, Visiting Family  On a scale of 0 to 10, with 0 being no pain and 10 being severe pain, what is your pain level?: (Patient-Rptd) 0 - (None)  In the past six months, have you experienced urine leakage?: (Patient-Rptd) 0-No  At any time do you feel concerned for the safety/well-being of yourself and/or your children, in your home or elsewhere?: (Patient-Rptd) No  Have you had any immunizations at another office such as Influenza, Hepatitis B, Tetanus, or Pneumococcal?: (Patient-Rptd) No    Health Maintenance   Topic Date Due    Annual Well Visit  01/01/2025    COVID-19 Vaccine (7 - 2024-25 season) 03/28/2025    PSA  09/03/2026    Colorectal Cancer Screening  01/02/2027    Influenza Vaccine  Completed    Annual Depression Screening  Completed    Fall Risk Screening (Annual)  Completed    Pneumococcal Vaccine: 50+ Years  Completed    Zoster Vaccines  Completed    Meningococcal B Vaccine  Aged Out

## 2025-07-25 ENCOUNTER — MED REC SCAN ONLY (OUTPATIENT)
Dept: FAMILY MEDICINE CLINIC | Facility: CLINIC | Age: 65
End: 2025-07-25

## (undated) DIAGNOSIS — I10 ESSENTIAL HYPERTENSION: ICD-10-CM

## (undated) DEVICE — MEDI-VAC NON-CONDUCTIVE SUCTION TUBING: Brand: CARDINAL HEALTH

## (undated) DEVICE — 1200CC GUARDIAN II: Brand: GUARDIAN

## (undated) DEVICE — MASK ISOLATION

## (undated) DEVICE — SINGLE USE BIOPSY VALVE MAJ-210: Brand: SINGLE USE BIOPSY VALVE (STERILE)

## (undated) DEVICE — BOWLS UTILITY 16OZ

## (undated) DEVICE — TRAP SPECIMEN MUCOUS 70 CUBIC CENTIMETER POLYURETHANE STERILE NOT MADE WITH NATURAL RUBBER LATEX MEDICHOICE: Brand: MEDICHOICE

## (undated) DEVICE — SINGLE USE SUCTION VALVE MAJ-209: Brand: SINGLE USE SUCTION VALVE (STERILE)

## (undated) DEVICE — 60 ML SYRINGE REGULAR TIP: Brand: MONOJECT

## (undated) DEVICE — TRAP MUCUS 20ML

## (undated) NOTE — LETTER
11/24/21    Dear Dr. Angelika Wilcox      Thank you for referring your patient, Zulema Lopez to me for an evaluation. Please see my initial consult note enclosed below. Let me know if you have any questions.     Thank you  Sonali Powers MD, Neurology notes when this sensation occurs, he is not able to urinate; this improves when he sits down.  He also notes intermittent elevated heart rate with minimal exertion but denies any dizzy spells or light headed sensation or double vision; no tightness around c Height as of this encounter: 66.34\". Weight as of this encounter: 226 lb (102.5 kg).     GENERAL: well developed, well nourished, in no apparent distress  SKIN: no rashes  EYES: sclera anicteric, conjunctiva normal  HEENT: normocephalic  CARDIOVASCULAR: leg raise: negative  CELENA test: negative    TEST RESULTS/DATA REVIEWED:     Labs  Component      Latest Ref Rng & Units 11/17/2021 8/26/2021   WBC      3.8 - 10.8 Thousand/uL  8.0   RBC      4.20 - 5.80 Million/uL  5.12   Hemoglobin      13.2 - 17.1 g/dL mg/dL (calc)  114   Vitamin B12      200 - 1,100 pg/mL 1,589 (H) 265   PSA, TOTAL      < OR = 4.0 ng/mL  2.0   TSH      0.40 - 4.50 mIU/L  1.87       IMPRESSION AND PLAN:   Alberto Butcher is a 57year old male with no significant PMHx who presents for charles

## (undated) NOTE — LETTER
[facility], [address]  [place] [address]  PH: [phone]  FAX: [phone]        01/10/24  Fernie Pop, :  1960  [address] [place] [address]      To Whom it may concern:    This letter has been written at the patient's request. The above patient was seen at the [facility] for treatment of a medical condition.    This patient should received the Hepatitis B vaccine on 2023.    The patient may tested positive for Hepatitis B due to this reason.        Sincerely,        Rosario Pineda DO

## (undated) NOTE — LETTER
Date: 1/30/2024    Patient Name: Fernie Pop      To Whom it may concern:    This letter has been written at the patient's request. The above patient was seen at the [facility] for treatment of a medical condition.    This patient tested negative for hepatitis B antigen and antibody. Therefore, he does not have Hepatitis B infection. Please contact our office if any questions. Testing below:  Component      Latest Ref Rng 1/26/2024   HEPATITIS B SURFACE$ANTIBODY (QUANT)      > OR = 10 mIU/mL <5 (L)    HBSAg Screen      NON-REACTIVE  NON-REACTIVE         Sincerely,    Rosario Pineda DO